# Patient Record
Sex: FEMALE | Race: WHITE | Employment: UNEMPLOYED | URBAN - METROPOLITAN AREA
[De-identification: names, ages, dates, MRNs, and addresses within clinical notes are randomized per-mention and may not be internally consistent; named-entity substitution may affect disease eponyms.]

---

## 2018-03-21 ENCOUNTER — OFFICE VISIT (OUTPATIENT)
Dept: OBGYN CLINIC | Age: 28
End: 2018-03-21
Payer: COMMERCIAL

## 2018-03-21 ENCOUNTER — HOSPITAL ENCOUNTER (OUTPATIENT)
Dept: ULTRASOUND IMAGING | Facility: CLINIC | Age: 28
Discharge: HOME | End: 2018-03-23
Payer: COMMERCIAL

## 2018-03-21 VITALS
HEIGHT: 63 IN | WEIGHT: 144 LBS | HEART RATE: 78 BPM | DIASTOLIC BLOOD PRESSURE: 77 MMHG | SYSTOLIC BLOOD PRESSURE: 114 MMHG | BODY MASS INDEX: 25.52 KG/M2

## 2018-03-21 DIAGNOSIS — N91.2 AMENORRHEA: ICD-10-CM

## 2018-03-21 DIAGNOSIS — N91.2 AMENORRHEA: Primary | ICD-10-CM

## 2018-03-21 LAB
CONTROL: POSITIVE
PREGNANCY TEST URINE, POC: POSITIVE

## 2018-03-21 PROCEDURE — 76817 TRANSVAGINAL US OBSTETRIC: CPT

## 2018-03-21 PROCEDURE — G8419 CALC BMI OUT NRM PARAM NOF/U: HCPCS | Performed by: ADVANCED PRACTICE MIDWIFE

## 2018-03-21 PROCEDURE — G8484 FLU IMMUNIZE NO ADMIN: HCPCS | Performed by: ADVANCED PRACTICE MIDWIFE

## 2018-03-21 PROCEDURE — G8427 DOCREV CUR MEDS BY ELIG CLIN: HCPCS | Performed by: ADVANCED PRACTICE MIDWIFE

## 2018-03-21 PROCEDURE — 81025 URINE PREGNANCY TEST: CPT | Performed by: ADVANCED PRACTICE MIDWIFE

## 2018-03-21 PROCEDURE — 76801 OB US < 14 WKS SINGLE FETUS: CPT

## 2018-03-21 PROCEDURE — 1036F TOBACCO NON-USER: CPT | Performed by: ADVANCED PRACTICE MIDWIFE

## 2018-03-21 PROCEDURE — 99202 OFFICE O/P NEW SF 15 MIN: CPT | Performed by: ADVANCED PRACTICE MIDWIFE

## 2018-03-21 NOTE — PROGRESS NOTES
SUBJECTIVE:    Chief Complaint:  Chief Complaint   Patient presents with   [address] [facility] [address] Amenorrhea     LMP 18 (4 days and lighter than normal)       HPI:    [facility]  is being seen today for missed menses. She reports a  positive pregnancy test on end of Feb.  This  is a planned pregnancy. She is  accepting at this time. LMP: 18      Sure and definite: No     28 day cycle: Yes    She was not on a contraceptive at the time of conception. Estimated weeks gestation today : 7w 5d  Tentative SKIP: 18    Relationship with FOB: good, strained by family    OBJECTIVE:    VS as documented in Epic. Mother's ethnicity:  fr[place]  Father's ethnicity:  fr[place]    She is complaining today of:   Pain: No  Cramping: No  Bleeding or spotting: No    Nausea: No  Vomiting: No    Breast enlargement/tenderness: Yes  Fatigue: Yes  Frequency of urination: No    Previous high risk obstetrical history: spont Ab x1  OB History    Para Term  AB Living   1       1     SAB TAB Ectopic Molar Multiple Live Births   1                # Outcome Date GA Lbr Jaylen/2nd Weight Sex Delivery Anes PTL Lv   1 SAB 2017                  ROS was done and is negative except as documented in HPI. History was reviewed as documented on Epic Navigator. ASSESSMENT:  Missed menses with + UCG    PLAN:  UCG was done and noted as positive  Prenatal vitamins were ordered: Yes  Ultrasound for dating and viability was ordered: Yes  OB form was given: Yes  Initial information on genetic testing was given:Yes  1 hour glucola was ordered: No  She was instructed to call with any concerns or worsening of any symptoms  She will return for New OB intake visit    Patient was seen with total face to face time of 20 minutes. More than 50% of this visit was spent face to face coordinating plan of care and answering questions regarding    1.  Amenorrhea  POCT urine pregnancy    US OB LESS THAN 14 WEEKS SINGLE OR FIRST GESTATION    US OB TRANSVAGINAL    . She was also counseled on her preventative health maintenance recommendations and follow-up.

## 2018-03-26 ENCOUNTER — TELEPHONE (OUTPATIENT)
Dept: OBGYN CLINIC | Age: 28
End: 2018-03-26

## 2018-03-26 ENCOUNTER — HOSPITAL ENCOUNTER (OUTPATIENT)
Age: 28
Setting detail: SPECIMEN
Discharge: HOME | End: 2018-03-26
Payer: COMMERCIAL

## 2018-03-26 DIAGNOSIS — O20.9 VAGINAL BLEEDING BEFORE 22 WEEKS GESTATION: ICD-10-CM

## 2018-03-26 DIAGNOSIS — O36.80X0 PREGNANCY WITH UNCERTAIN FETAL VIABILITY, SINGLE OR UNSPECIFIED FETUS: ICD-10-CM

## 2018-03-26 DIAGNOSIS — N92.6 MISSED MENSES: Primary | ICD-10-CM

## 2018-03-26 DIAGNOSIS — O20.9 VAGINAL BLEEDING BEFORE 22 WEEKS GESTATION: Primary | ICD-10-CM

## 2018-03-26 DIAGNOSIS — N92.6 MISSED MENSES: ICD-10-CM

## 2018-03-26 LAB
ABO/RH: NORMAL
ANTIBODY SCREEN: NEGATIVE
HCG QUANTITATIVE: ABNORMAL IU/L

## 2018-03-26 NOTE — TELEPHONE ENCOUNTER
Pt states that she had an HCG level done at [address]  prior to coming to our office on 3/21/18. She states that her level was in the 300's. She did have a positive pregnancy test in our office. She is currently experiencing spotting and lower back pain. According to her LMP she is 8w 2/7. Her US done on 3/21 needs to be repeated as they were not able to calculate a gestational age. She is requesting to have her HCG level drawn again as she has a Hx of miscarriage. Please advise.

## 2018-03-26 NOTE — TELEPHONE ENCOUNTER
Pt would like her HCG levels redrawn please give her a call when the order if ready or if there is an issue

## 2018-03-26 NOTE — TELEPHONE ENCOUNTER
Spoke with [facility]. Is aware of inconclusive US and need for repeat hcg. Bleeding again today. Hcg order along with blood type ordered.  Will come here for draw

## 2018-03-27 ENCOUNTER — HOSPITAL ENCOUNTER (OUTPATIENT)
Dept: ULTRASOUND IMAGING | Facility: CLINIC | Age: 28
Discharge: HOME | End: 2018-03-29
Payer: COMMERCIAL

## 2018-03-27 DIAGNOSIS — O36.80X0 PREGNANCY WITH UNCERTAIN FETAL VIABILITY, SINGLE OR UNSPECIFIED FETUS: ICD-10-CM

## 2018-03-27 DIAGNOSIS — O20.9 VAGINAL BLEEDING BEFORE 22 WEEKS GESTATION: ICD-10-CM

## 2018-03-27 PROCEDURE — 76801 OB US < 14 WKS SINGLE FETUS: CPT

## 2018-03-27 PROCEDURE — 76817 TRANSVAGINAL US OBSTETRIC: CPT

## 2018-04-06 ENCOUNTER — PATIENT MESSAGE (OUTPATIENT)
Dept: OBGYN CLINIC | Age: 28
End: 2018-04-06

## 2018-04-12 ENCOUNTER — PATIENT MESSAGE (OUTPATIENT)
Dept: OBGYN CLINIC | Age: 28
End: 2018-04-12

## 2018-04-23 ENCOUNTER — PATIENT MESSAGE (OUTPATIENT)
Dept: OBGYN CLINIC | Age: 28
End: 2018-04-23

## 2018-04-27 ENCOUNTER — INITIAL PRENATAL (OUTPATIENT)
Dept: OBGYN CLINIC | Age: 28
End: 2018-04-27
Payer: COMMERCIAL

## 2018-04-27 VITALS
BODY MASS INDEX: 25.86 KG/M2 | WEIGHT: 146 LBS | HEART RATE: 76 BPM | DIASTOLIC BLOOD PRESSURE: 74 MMHG | SYSTOLIC BLOOD PRESSURE: 117 MMHG

## 2018-04-27 DIAGNOSIS — A60.09 HERPES GENITALIS IN WOMEN: ICD-10-CM

## 2018-04-27 DIAGNOSIS — Z33.1 PREGNANCY, NORMAL, INCIDENTAL: Primary | ICD-10-CM

## 2018-04-27 DIAGNOSIS — B97.7 HPV IN FEMALE: ICD-10-CM

## 2018-04-27 DIAGNOSIS — Z86.59 HISTORY OF DEPRESSION: ICD-10-CM

## 2018-04-27 DIAGNOSIS — Z13.79 GENETIC TESTING: ICD-10-CM

## 2018-04-27 PROCEDURE — 1036F TOBACCO NON-USER: CPT | Performed by: ADVANCED PRACTICE MIDWIFE

## 2018-04-27 PROCEDURE — 99213 OFFICE O/P EST LOW 20 MIN: CPT | Performed by: ADVANCED PRACTICE MIDWIFE

## 2018-04-27 PROCEDURE — G8419 CALC BMI OUT NRM PARAM NOF/U: HCPCS | Performed by: ADVANCED PRACTICE MIDWIFE

## 2018-04-27 PROCEDURE — G8427 DOCREV CUR MEDS BY ELIG CLIN: HCPCS | Performed by: ADVANCED PRACTICE MIDWIFE

## 2018-05-15 ENCOUNTER — ROUTINE PRENATAL (OUTPATIENT)
Dept: PERINATAL CARE | Age: 28
End: 2018-05-15
Payer: COMMERCIAL

## 2018-05-15 VITALS
WEIGHT: 148 LBS | HEIGHT: 63 IN | SYSTOLIC BLOOD PRESSURE: 108 MMHG | DIASTOLIC BLOOD PRESSURE: 68 MMHG | RESPIRATION RATE: 16 BRPM | TEMPERATURE: 98.3 F | BODY MASS INDEX: 26.22 KG/M2 | HEART RATE: 78 BPM

## 2018-05-15 DIAGNOSIS — Z3A.13 13 WEEKS GESTATION OF PREGNANCY: ICD-10-CM

## 2018-05-15 DIAGNOSIS — O35.2XX0 HEREDITARY FAMILIAL DISEASE AFFECTING MANAGEMENT OF MOTHER AND POSSIBLY AFFECTING FETUS, ANTEPARTUM, SINGLE OR UNSPECIFIED FETUS: ICD-10-CM

## 2018-05-15 DIAGNOSIS — Z36.9 FIRST TRIMESTER SCREENING: Primary | ICD-10-CM

## 2018-05-15 DIAGNOSIS — O36.80X0 ENCOUNTER TO DETERMINE FETAL VIABILITY OF PREGNANCY, SINGLE OR UNSPECIFIED FETUS: ICD-10-CM

## 2018-05-15 PROCEDURE — 76801 OB US < 14 WKS SINGLE FETUS: CPT | Performed by: OBSTETRICS & GYNECOLOGY

## 2018-05-15 PROCEDURE — 76813 OB US NUCHAL MEAS 1 GEST: CPT | Performed by: OBSTETRICS & GYNECOLOGY

## 2018-05-27 PROBLEM — Z13.79 GENETIC TESTING: Status: RESOLVED | Noted: 2018-04-27 | Resolved: 2018-05-27

## 2018-06-15 ENCOUNTER — PATIENT MESSAGE (OUTPATIENT)
Dept: OBGYN CLINIC | Age: 28
End: 2018-06-15

## 2018-07-03 ENCOUNTER — ROUTINE PRENATAL (OUTPATIENT)
Dept: PERINATAL CARE | Age: 28
End: 2018-07-03
Payer: COMMERCIAL

## 2018-07-03 VITALS
HEART RATE: 76 BPM | BODY MASS INDEX: 26.75 KG/M2 | SYSTOLIC BLOOD PRESSURE: 106 MMHG | DIASTOLIC BLOOD PRESSURE: 68 MMHG | RESPIRATION RATE: 16 BRPM | WEIGHT: 151 LBS

## 2018-07-03 DIAGNOSIS — Z3A.20 20 WEEKS GESTATION OF PREGNANCY: ICD-10-CM

## 2018-07-03 DIAGNOSIS — Z36.86 ENCOUNTER FOR SCREENING FOR RISK OF PRE-TERM LABOR: ICD-10-CM

## 2018-07-03 DIAGNOSIS — O35.2XX0 HEREDITARY FAMILIAL DISEASE AFFECTING MANAGEMENT OF MOTHER AND POSSIBLY AFFECTING FETUS, ANTEPARTUM, SINGLE OR UNSPECIFIED FETUS: Primary | ICD-10-CM

## 2018-07-03 LAB
ABDOMINAL CIRCUMFERENCE: NORMAL CM
BIPARIETAL DIAMETER: NORMAL CM
ESTIMATED FETAL WEIGHT: NORMAL GRAMS
FEMORAL DIAMETER: NORMAL CM
HC/AC: NORMAL
HEAD CIRCUMFERENCE: NORMAL CM

## 2018-07-03 PROCEDURE — 76817 TRANSVAGINAL US OBSTETRIC: CPT | Performed by: OBSTETRICS & GYNECOLOGY

## 2018-07-03 PROCEDURE — 76811 OB US DETAILED SNGL FETUS: CPT | Performed by: OBSTETRICS & GYNECOLOGY

## 2018-07-03 RX ORDER — CALCIUM CARBONATE 200(500)MG
2 TABLET,CHEWABLE ORAL DAILY
COMMUNITY
Start: 2018-05-03 | End: 2020-01-07 | Stop reason: ALTCHOICE

## 2018-07-12 ENCOUNTER — HOSPITAL ENCOUNTER (OUTPATIENT)
Age: 28
Setting detail: SPECIMEN
Discharge: HOME | End: 2018-07-12
Payer: COMMERCIAL

## 2018-07-15 LAB
AFP INTERPRETATION: NORMAL
AFP MOM: 1.95
AFP SPECIMEN: NORMAL
AFP: 135 NG/ML
DATE OF BIRTH: NORMAL
DATING METHOD: NORMAL
DETERMINED BY: NORMAL
DIABETIC: NO
DUE DATE: NORMAL
ESTIMATED DUE DATE: NORMAL
FAMILY HISTORY NTD: NO
GESTATIONAL AGE: NORMAL
INSULIN REQ DIABETES: NO
LAST MENSTRUAL PERIOD: NORMAL
MATERNAL AGE AT EDD: 27.1 YR
MATERNAL WEIGHT: 151
MONOCHORIONIC TWINS: NORMAL
NUMBER OF FETUSES: NORMAL
PATIENT WEIGHT UNITS: NORMAL
PATIENT WEIGHT: NORMAL
RACE (MATERNAL): NORMAL
RACE: NORMAL
REPEAT SPECIMEN?: NO
SMOKING: NORMAL
SMOKING: NORMAL
VALPROIC/CARBAMAZEP: NORMAL
ZZ NTE CLEAN UP: HISTORY: NO

## 2018-07-23 ENCOUNTER — HOSPITAL ENCOUNTER (OUTPATIENT)
Age: 28
Setting detail: SPECIMEN
Discharge: HOME | End: 2018-07-23
Payer: COMMERCIAL

## 2018-07-23 ENCOUNTER — ROUTINE PRENATAL (OUTPATIENT)
Dept: OBGYN CLINIC | Age: 28
End: 2018-07-23
Payer: COMMERCIAL

## 2018-07-23 VITALS
BODY MASS INDEX: 27.14 KG/M2 | HEART RATE: 89 BPM | WEIGHT: 153.2 LBS | SYSTOLIC BLOOD PRESSURE: 112 MMHG | DIASTOLIC BLOOD PRESSURE: 68 MMHG

## 2018-07-23 DIAGNOSIS — Z33.1 PREGNANCY, NORMAL, INCIDENTAL: ICD-10-CM

## 2018-07-23 DIAGNOSIS — Z34.90 NORMAL PREGNANCY, ANTEPARTUM: Primary | ICD-10-CM

## 2018-07-23 DIAGNOSIS — Z3A.22 22 WEEKS GESTATION OF PREGNANCY: ICD-10-CM

## 2018-07-23 LAB
-: ABNORMAL
ABO/RH: NORMAL
ABSOLUTE EOS #: 0.27 K/UL (ref 0–0.44)
ABSOLUTE IMMATURE GRANULOCYTE: 0.09 K/UL (ref 0–0.3)
ABSOLUTE LYMPH #: 1.99 K/UL (ref 1.1–3.7)
ABSOLUTE MONO #: 0.61 K/UL (ref 0.1–1.2)
AMORPHOUS: ABNORMAL
AMPHETAMINE SCREEN URINE: NEGATIVE
ANTIBODY SCREEN: NEGATIVE
BACTERIA: ABNORMAL
BARBITURATE SCREEN URINE: NEGATIVE
BASOPHILS # BLD: 1 % (ref 0–2)
BASOPHILS ABSOLUTE: 0.05 K/UL (ref 0–0.2)
BENZODIAZEPINE SCREEN, URINE: NEGATIVE
BILIRUBIN URINE: NEGATIVE
BUPRENORPHINE URINE: NORMAL
CANNABINOID SCREEN URINE: NEGATIVE
CASTS UA: ABNORMAL /LPF (ref 0–2)
COCAINE METABOLITE, URINE: NEGATIVE
COLOR: YELLOW
COMMENT UA: ABNORMAL
CRYSTALS, UA: ABNORMAL /HPF
DIFFERENTIAL TYPE: ABNORMAL
EOSINOPHILS RELATIVE PERCENT: 3 % (ref 1–4)
EPITHELIAL CELLS UA: ABNORMAL /HPF (ref 0–5)
GLUCOSE URINE: ABNORMAL
HCT VFR BLD CALC: 36.3 % (ref 36.3–47.1)
HEMOGLOBIN: 11.7 G/DL (ref 11.9–15.1)
HEPATITIS B SURFACE ANTIGEN: NONREACTIVE
HIV AG/AB: NONREACTIVE
IMMATURE GRANULOCYTES: 1 %
KETONES, URINE: ABNORMAL
LEUKOCYTE ESTERASE, URINE: NEGATIVE
LYMPHOCYTES # BLD: 22 % (ref 24–43)
MCH RBC QN AUTO: 28.3 PG (ref 25.2–33.5)
MCHC RBC AUTO-ENTMCNC: 32.2 G/DL (ref 28.4–34.8)
MCV RBC AUTO: 87.7 FL (ref 82.6–102.9)
MDMA URINE: NORMAL
METHADONE SCREEN, URINE: NEGATIVE
METHAMPHETAMINE, URINE: NORMAL
MONOCYTES # BLD: 7 % (ref 3–12)
MUCUS: ABNORMAL
NITRITE, URINE: NEGATIVE
NRBC AUTOMATED: 0 PER 100 WBC
OPIATES, URINE: NEGATIVE
OTHER OBSERVATIONS UA: ABNORMAL
OXYCODONE SCREEN URINE: NEGATIVE
PDW BLD-RTO: 13.9 % (ref 11.8–14.4)
PH UA: 6 (ref 5–8)
PHENCYCLIDINE, URINE: NEGATIVE
PLATELET # BLD: 280 K/UL (ref 138–453)
PLATELET ESTIMATE: ABNORMAL
PMV BLD AUTO: 10.1 FL (ref 8.1–13.5)
PROPOXYPHENE, URINE: NORMAL
PROTEIN UA: ABNORMAL
RBC # BLD: 4.14 M/UL (ref 3.95–5.11)
RBC # BLD: ABNORMAL 10*6/UL
RBC UA: ABNORMAL /HPF (ref 0–2)
RENAL EPITHELIAL, UA: ABNORMAL /HPF
RUBV IGG SER QL: 65.8 IU/ML
SEG NEUTROPHILS: 66 % (ref 36–65)
SEGMENTED NEUTROPHILS ABSOLUTE COUNT: 6.16 K/UL (ref 1.5–8.1)
SPECIFIC GRAVITY UA: 1.04 (ref 1–1.03)
T. PALLIDUM, IGG: NONREACTIVE
TEST INFORMATION: NORMAL
TRICHOMONAS: ABNORMAL
TRICYCLIC ANTIDEPRESSANTS, UR: NORMAL
TURBIDITY: ABNORMAL
URINE HGB: NEGATIVE
UROBILINOGEN, URINE: NORMAL
WBC # BLD: 9.2 K/UL (ref 3.5–11.3)
WBC # BLD: ABNORMAL 10*3/UL
WBC UA: ABNORMAL /HPF (ref 0–5)
YEAST: ABNORMAL

## 2018-07-23 PROCEDURE — 99213 OFFICE O/P EST LOW 20 MIN: CPT | Performed by: ADVANCED PRACTICE MIDWIFE

## 2018-07-23 PROCEDURE — 1036F TOBACCO NON-USER: CPT | Performed by: ADVANCED PRACTICE MIDWIFE

## 2018-07-23 PROCEDURE — G8428 CUR MEDS NOT DOCUMENT: HCPCS | Performed by: ADVANCED PRACTICE MIDWIFE

## 2018-07-23 PROCEDURE — G8419 CALC BMI OUT NRM PARAM NOF/U: HCPCS | Performed by: ADVANCED PRACTICE MIDWIFE

## 2018-07-23 RX ORDER — ACETAMINOPHEN 500 MG
500 TABLET ORAL EVERY 6 HOURS PRN
Status: ON HOLD | COMMUNITY
End: 2018-11-08 | Stop reason: HOSPADM

## 2018-07-23 NOTE — PATIENT INSTRUCTIONS
as:  ¨ A rash. ¨ Itching. ¨ Yellow color to your skin. · You have other concerns about your pregnancy. If you have labor signs at 37 weeks or more  If you have signs of labor at 37 weeks or more, your doctor may tell you to call when your labor becomes more active. Symptoms of active labor include:  · Contractions that are regular. · Contractions that are less than 5 minutes apart. · Contractions that are hard to talk through. Follow-up care is a key part of your treatment and safety. Be sure to make and go to all appointments, and call your doctor if you are having problems. It's also a good idea to know your test results and keep a list of the medicines you take. Where can you learn more? Go to https://[employer]Rocketmiles. org and sign in to your [employer]etary) account. Enter  in the Mobile Game Day box to learn more about \"Learning About When to Call Your Doctor During Pregnancy (After 20 Weeks). \"     If you do not have an account, please click on the \"Sign Up Now\" link. Current as of: November 21, 2017  Content Version: 11.6  © 3684-5283 [employer]. Care instructions adapted under license by [facility] ([facility]). If you have questions about a medical condition or this instruction, always ask your healthcare professional. [place] [address] any warranty or liability for your use of this information. Patient Education        Weeks 22 to 26 of Your Pregnancy: Care Instructions  Your Care Instructions    As you enter your 7th month of pregnancy at week 26, your baby's lungs are growing stronger and getting ready to breathe. You may notice that your baby responds to the sound of your or your partner's voice. You may also notice that your baby does less turning and twisting and more squirming or jerking. Jerking often means that your baby has the hiccups. Hiccups are perfectly normal and are only temporary.   You may want to think about attending a [facility]. Care instructions adapted under license by [facility] ([facility]). If you have questions about a medical condition or this instruction, always ask your healthcare professional. [address] any warranty or liability for your use of this information.

## 2018-07-24 LAB
C. TRACHOMATIS DNA ,URINE: NEGATIVE
CULTURE: NORMAL
Lab: NORMAL
N. GONORRHOEAE DNA, URINE: NEGATIVE
SPECIMEN DESCRIPTION: NORMAL
STATUS: NORMAL

## 2018-07-27 ENCOUNTER — TELEPHONE (OUTPATIENT)
Dept: FAMILY MEDICINE CLINIC | Age: 28
End: 2018-07-27

## 2018-07-27 ENCOUNTER — TELEPHONE (OUTPATIENT)
Dept: OBGYN CLINIC | Age: 28
End: 2018-07-27

## 2018-07-27 DIAGNOSIS — M54.9 BACK PAIN AFFECTING PREGNANCY IN SECOND TRIMESTER: Primary | ICD-10-CM

## 2018-07-27 DIAGNOSIS — O99.891 BACK PAIN AFFECTING PREGNANCY IN SECOND TRIMESTER: Primary | ICD-10-CM

## 2018-07-27 NOTE — TELEPHONE ENCOUNTER
Pt said the script for the belly band went to the wrong pharmacy and resend to [facility], phone number is [phone]

## 2018-07-31 ENCOUNTER — ROUTINE PRENATAL (OUTPATIENT)
Dept: PERINATAL CARE | Age: 28
End: 2018-07-31
Payer: COMMERCIAL

## 2018-07-31 VITALS
RESPIRATION RATE: 16 BRPM | WEIGHT: 156 LBS | SYSTOLIC BLOOD PRESSURE: 106 MMHG | TEMPERATURE: 98.1 F | HEART RATE: 82 BPM | BODY MASS INDEX: 27.63 KG/M2 | DIASTOLIC BLOOD PRESSURE: 70 MMHG

## 2018-07-31 DIAGNOSIS — Z36.4 ANTENATAL SCREENING FOR FETAL GROWTH RETARDATION USING ULTRASONICS: ICD-10-CM

## 2018-07-31 DIAGNOSIS — Z3A.24 24 WEEKS GESTATION OF PREGNANCY: ICD-10-CM

## 2018-07-31 DIAGNOSIS — O35.2XX0 HEREDITARY FAMILIAL DISEASE AFFECTING MANAGEMENT OF MOTHER AND POSSIBLY AFFECTING FETUS, ANTEPARTUM, SINGLE OR UNSPECIFIED FETUS: Primary | ICD-10-CM

## 2018-07-31 PROCEDURE — 93325 DOPPLER ECHO COLOR FLOW MAPG: CPT | Performed by: OBSTETRICS & GYNECOLOGY

## 2018-07-31 PROCEDURE — 76816 OB US FOLLOW-UP PER FETUS: CPT | Performed by: OBSTETRICS & GYNECOLOGY

## 2018-07-31 PROCEDURE — 76825 ECHO EXAM OF FETAL HEART: CPT | Performed by: OBSTETRICS & GYNECOLOGY

## 2018-07-31 PROCEDURE — 76827 ECHO EXAM OF FETAL HEART: CPT | Performed by: OBSTETRICS & GYNECOLOGY

## 2018-08-20 ENCOUNTER — HOSPITAL ENCOUNTER (OUTPATIENT)
Age: 28
Setting detail: SPECIMEN
Discharge: HOME | End: 2018-08-20
Payer: COMMERCIAL

## 2018-08-20 ENCOUNTER — ROUTINE PRENATAL (OUTPATIENT)
Dept: OBGYN CLINIC | Age: 28
End: 2018-08-20
Payer: COMMERCIAL

## 2018-08-20 VITALS
SYSTOLIC BLOOD PRESSURE: 117 MMHG | WEIGHT: 160 LBS | DIASTOLIC BLOOD PRESSURE: 71 MMHG | BODY MASS INDEX: 28.34 KG/M2 | HEART RATE: 99 BPM

## 2018-08-20 DIAGNOSIS — Z34.90 NORMAL PREGNANCY, ANTEPARTUM: ICD-10-CM

## 2018-08-20 DIAGNOSIS — Z3A.26 26 WEEKS GESTATION OF PREGNANCY: ICD-10-CM

## 2018-08-20 DIAGNOSIS — Z86.19 HISTORY OF HERPES SIMPLEX TYPE 2 INFECTION: ICD-10-CM

## 2018-08-20 DIAGNOSIS — O99.810 ABNORMAL GLUCOSE TOLERANCE IN PREGNANCY: Primary | ICD-10-CM

## 2018-08-20 DIAGNOSIS — Z34.92 PRENATAL CARE IN SECOND TRIMESTER: ICD-10-CM

## 2018-08-20 DIAGNOSIS — Z34.92 PRENATAL CARE IN SECOND TRIMESTER: Primary | ICD-10-CM

## 2018-08-20 LAB
ABSOLUTE EOS #: 0.26 K/UL (ref 0–0.44)
ABSOLUTE IMMATURE GRANULOCYTE: 0.15 K/UL (ref 0–0.3)
ABSOLUTE LYMPH #: 2.13 K/UL (ref 1.1–3.7)
ABSOLUTE MONO #: 0.87 K/UL (ref 0.1–1.2)
BASOPHILS # BLD: 0 % (ref 0–2)
BASOPHILS ABSOLUTE: 0.04 K/UL (ref 0–0.2)
DIFFERENTIAL TYPE: ABNORMAL
EOSINOPHILS RELATIVE PERCENT: 2 % (ref 1–4)
GLUCOSE ADMINISTRATION: ABNORMAL
GLUCOSE TOLERANCE SCREEN 50G: 137 MG/DL (ref 70–135)
HCT VFR BLD CALC: 36.6 % (ref 36.3–47.1)
HEMOGLOBIN: 11.8 G/DL (ref 11.9–15.1)
HIV AG/AB: NONREACTIVE
IMMATURE GRANULOCYTES: 1 %
LYMPHOCYTES # BLD: 19 % (ref 24–43)
MCH RBC QN AUTO: 28.6 PG (ref 25.2–33.5)
MCHC RBC AUTO-ENTMCNC: 32.2 G/DL (ref 28.4–34.8)
MCV RBC AUTO: 88.6 FL (ref 82.6–102.9)
MONOCYTES # BLD: 8 % (ref 3–12)
NRBC AUTOMATED: 0 PER 100 WBC
PDW BLD-RTO: 13.7 % (ref 11.8–14.4)
PLATELET # BLD: 282 K/UL (ref 138–453)
PLATELET ESTIMATE: ABNORMAL
PMV BLD AUTO: 10 FL (ref 8.1–13.5)
RBC # BLD: 4.13 M/UL (ref 3.95–5.11)
RBC # BLD: ABNORMAL 10*6/UL
SEG NEUTROPHILS: 70 % (ref 36–65)
SEGMENTED NEUTROPHILS ABSOLUTE COUNT: 7.82 K/UL (ref 1.5–8.1)
T. PALLIDUM, IGG: NONREACTIVE
WBC # BLD: 11.3 K/UL (ref 3.5–11.3)
WBC # BLD: ABNORMAL 10*3/UL

## 2018-08-20 PROCEDURE — 99213 OFFICE O/P EST LOW 20 MIN: CPT | Performed by: ADVANCED PRACTICE MIDWIFE

## 2018-08-20 NOTE — PROGRESS NOTES
SUBJECTIVE:  Manoj is here for her return OB visit. She reports fetal movement. She denies vaginal bleeding. She denies vaginal discharge. She denies leaking of fluid. She denies uterine contraction activity. She denies nausea and/or vomiting. She denies retaining fluid in her extremities. Having some fatigue otherwise is feeling well. States she is unsure if FOB wants her to deliver at [facility]. V's. Discussed if she wanted to continue care with us that is where we deliver. Pt will let us know at next visit    IS having a boy named  Carlo Reid III after the baby's father     OBJECTIVE:  Blood pressure 117/71, pulse 99, weight 160 lb (72.6 kg), last menstrual period 01/26/2018. [facility] has not the flu vaccine as appropriate  [facility] has not the Tdap vaccine as appropriate is undecided     Swathii score: 9      ASSESSMENT/PLAN:  IUP @ 26+6 weeks  S = D  H/o HSV will start suppression at 36 weeks    The problem list was reviewed and updated with any new issues from today's visit    [facility] will monitor fetal movement daily. 28 week lab panel was discussed and was ordered, rpt HIV and RPR ordered d/t pt h/o of stds. RhoGam was discussed and was not ordered as it is not indicated   Discussed depression in pregnancy and PP, warning signs reviewed  Discussed tDap pt deciding will offer again at N/V    [facility] was counseled regarding all of the above    Patient was seen with total face to face time of 15 minutes. More than 50% of this visit was for education and counseling.

## 2018-08-21 ENCOUNTER — HOSPITAL ENCOUNTER (OUTPATIENT)
Age: 28
Setting detail: SPECIMEN
Discharge: HOME | End: 2018-08-21
Payer: COMMERCIAL

## 2018-08-21 DIAGNOSIS — O99.810 ABNORMAL GLUCOSE TOLERANCE IN PREGNANCY: ICD-10-CM

## 2018-08-21 LAB
AMOUNT GLUCOSE GIVEN: 100 G
GLUCOSE FASTING: 71 MG/DL (ref 65–99)
GLUCOSE TOLERANCE TEST 1 HOUR: 122 MG/DL (ref 65–184)
GLUCOSE TOLERANCE TEST 2 HOUR: 117 MG/DL (ref 65–139)
GLUCOSE TOLERANCE TEST 3 HOUR: 139 MG/DL (ref 65–130)

## 2018-08-22 ENCOUNTER — TELEPHONE (OUTPATIENT)
Dept: OBGYN CLINIC | Age: 28
End: 2018-08-22

## 2018-08-22 NOTE — TELEPHONE ENCOUNTER
Manoj called stating that she was to have a antibiotic called in for BV but I could not find a test that said she has bv but did tell her she failed her 3 hr glucose test.

## 2018-09-10 ENCOUNTER — ROUTINE PRENATAL (OUTPATIENT)
Dept: OBGYN CLINIC | Age: 28
End: 2018-09-10
Payer: COMMERCIAL

## 2018-09-10 VITALS
BODY MASS INDEX: 28.89 KG/M2 | SYSTOLIC BLOOD PRESSURE: 127 MMHG | WEIGHT: 163.1 LBS | DIASTOLIC BLOOD PRESSURE: 79 MMHG | HEART RATE: 109 BPM

## 2018-09-10 DIAGNOSIS — J06.9 ACUTE URI: ICD-10-CM

## 2018-09-10 DIAGNOSIS — Z34.90 NORMAL PREGNANCY, ANTEPARTUM: Primary | ICD-10-CM

## 2018-09-10 DIAGNOSIS — Z3A.29 29 WEEKS GESTATION OF PREGNANCY: ICD-10-CM

## 2018-09-10 LAB — S PYO AG THROAT QL: NORMAL

## 2018-09-10 PROCEDURE — 87880 STREP A ASSAY W/OPTIC: CPT | Performed by: ADVANCED PRACTICE MIDWIFE

## 2018-09-10 PROCEDURE — G8419 CALC BMI OUT NRM PARAM NOF/U: HCPCS | Performed by: ADVANCED PRACTICE MIDWIFE

## 2018-09-10 PROCEDURE — 1036F TOBACCO NON-USER: CPT | Performed by: ADVANCED PRACTICE MIDWIFE

## 2018-09-10 PROCEDURE — G8427 DOCREV CUR MEDS BY ELIG CLIN: HCPCS | Performed by: ADVANCED PRACTICE MIDWIFE

## 2018-09-10 PROCEDURE — 99213 OFFICE O/P EST LOW 20 MIN: CPT | Performed by: ADVANCED PRACTICE MIDWIFE

## 2018-09-10 NOTE — PROGRESS NOTES
SUBJECTIVE:  [facility] is here for an extra OB visit for URI. She has had this for the past several days. She denies any fever, malaise, etc.  States has tried \"everything\" with no improvement. Coughing will make her throw up at times. States others in family sick with same. She reports fetal movement with no other OB complaints      OBJECTIVE:  Blood pressure 127/79, pulse 109, weight 163 lb 1.6 oz (74 kg), last menstrual period 01/26/2018. On exam, ears are clear bilaterally with reflex present. Lungs  Clear  No erythema or white patches in throat. Rapid Strep obtained. ASSESSMENT/PLAN:  IUP @ 29.6 weeks  Acute viral URI      The problem list was reviewed and updated with any new issues from today's visit    [facility] will monitor fetal movement daily. Reinforced viral vs bacterial or flu. Continue with symptomatic treatment. Robitussin for cough. Call if worsening  Keep scheduled appt    [facility] was counseled regarding all of the above    Patient was seen with total face to face time of 15 minutes. More than 50% of this visit was for education and counseling.

## 2018-09-17 ENCOUNTER — ROUTINE PRENATAL (OUTPATIENT)
Dept: OBGYN CLINIC | Age: 28
End: 2018-09-17
Payer: COMMERCIAL

## 2018-09-17 VITALS
BODY MASS INDEX: 29.48 KG/M2 | DIASTOLIC BLOOD PRESSURE: 67 MMHG | HEART RATE: 90 BPM | WEIGHT: 166.4 LBS | SYSTOLIC BLOOD PRESSURE: 113 MMHG

## 2018-09-17 DIAGNOSIS — Z34.03 ENCOUNTER FOR SUPERVISION OF NORMAL FIRST PREGNANCY IN THIRD TRIMESTER: Primary | ICD-10-CM

## 2018-09-17 DIAGNOSIS — Z3A.30 30 WEEKS GESTATION OF PREGNANCY: ICD-10-CM

## 2018-09-17 DIAGNOSIS — Z23 NEED FOR TDAP VACCINATION: ICD-10-CM

## 2018-09-17 DIAGNOSIS — Z34.90 NORMAL PREGNANCY, ANTEPARTUM: ICD-10-CM

## 2018-09-17 PROCEDURE — 99212 OFFICE O/P EST SF 10 MIN: CPT | Performed by: ADVANCED PRACTICE MIDWIFE

## 2018-09-17 PROCEDURE — 90715 TDAP VACCINE 7 YRS/> IM: CPT | Performed by: ADVANCED PRACTICE MIDWIFE

## 2018-09-17 PROCEDURE — 90471 IMMUNIZATION ADMIN: CPT | Performed by: ADVANCED PRACTICE MIDWIFE

## 2018-09-17 NOTE — PROGRESS NOTES
SUBJECTIVE:  Manoj is here for her return OB visit. She reports fetal movement. She denies  vaginal bleeding. She denies  vaginal discharge. She denies leaking of fluid. She denies uterine contraction activity. She denies nausea and/or vomiting. She denies retaining fluid in her extremities. OBJECTIVE:  Blood pressure 113/67, pulse 90, weight 166 lb 6.4 oz (75.5 kg), last menstrual period 2018. [facility] has not received the flu vaccine as appropriate declined  [facility] has received the Tdap vaccine as appropriate TODAY 2018        ASSESSMENT/PLAN:  IUP @ 30+6 weeks  S = D  Abnormal 1 hr 3 hr WNL  The problem list was reviewed and updated with any new issues from today's visit    Manoj will monitor fetal movement daily. 28 week lab results were reviewed. Fetal Kick Count was discussed and explained. Max-Wilde contractions vs  labor contractions were reviewed. Signs and symptoms of Pre-Eclampsia were were reviewed and discussed    Manoj was counseled regarding all of the above    Patient was seen with total face to face time of 15 minutes. More than 50% of this visit was for education and counseling.

## 2018-10-01 ENCOUNTER — ROUTINE PRENATAL (OUTPATIENT)
Dept: OBGYN CLINIC | Age: 28
End: 2018-10-01
Payer: COMMERCIAL

## 2018-10-01 VITALS
WEIGHT: 168.7 LBS | HEART RATE: 99 BPM | BODY MASS INDEX: 29.88 KG/M2 | SYSTOLIC BLOOD PRESSURE: 114 MMHG | DIASTOLIC BLOOD PRESSURE: 76 MMHG

## 2018-10-01 DIAGNOSIS — Z23 NEEDS FLU SHOT: ICD-10-CM

## 2018-10-01 PROCEDURE — G8419 CALC BMI OUT NRM PARAM NOF/U: HCPCS | Performed by: ADVANCED PRACTICE MIDWIFE

## 2018-10-01 PROCEDURE — G8427 DOCREV CUR MEDS BY ELIG CLIN: HCPCS | Performed by: ADVANCED PRACTICE MIDWIFE

## 2018-10-01 PROCEDURE — 1036F TOBACCO NON-USER: CPT | Performed by: ADVANCED PRACTICE MIDWIFE

## 2018-10-01 PROCEDURE — G8484 FLU IMMUNIZE NO ADMIN: HCPCS | Performed by: ADVANCED PRACTICE MIDWIFE

## 2018-10-01 PROCEDURE — 99213 OFFICE O/P EST LOW 20 MIN: CPT | Performed by: ADVANCED PRACTICE MIDWIFE

## 2018-10-01 NOTE — PROGRESS NOTES
SUBJECTIVE:  Manoj is here for her return OB visit. She reports fetal movement. She denies  vaginal bleeding. She denies  vaginal discharge. She denies leaking of fluid. She denies uterine contraction activity. She denies nausea and/or vomiting. She denies retaining fluid in her extremities. Feeling well today. Offers no complaints. Good baby movements. OBJECTIVE:  Blood pressure 114/76, pulse 99, weight 168 lb 11.2 oz (76.5 kg), last menstrual period 2018. Manoj has not received the flu shot. Declined reviewed risks to benefits to her and fetus. Verbalized understanding   Manoj has received the Tdap vaccine as appropriate    ASSESSMENT/PLAN:  IUP @ 32.6 weeks  S = D  Normal Pregnancy. Antepatrum    The problem list was reviewed and updated with any new issues from today's visit    Manoj will monitor fetal movement daily. 28 week lab results were reviewed. Fetal Kick Count was discussed and explained. Max-Wilde contractions vs  labor contractions were reviewed. Signs and symptoms of Pre-Eclampsia were were reviewed and discussed    Manoj was counseled regarding all of the above  Will RTO in 2 weeks. Patient was seen with total face to face time of 15 minutes. More than 50% of this visit was for education and counseling.

## 2018-10-09 ENCOUNTER — HOSPITAL ENCOUNTER (OUTPATIENT)
Age: 28
Discharge: HOME | End: 2018-10-09
Attending: OBSTETRICS & GYNECOLOGY | Admitting: OBSTETRICS & GYNECOLOGY
Payer: COMMERCIAL

## 2018-10-09 ENCOUNTER — PATIENT MESSAGE (OUTPATIENT)
Dept: OBGYN CLINIC | Age: 28
End: 2018-10-09

## 2018-10-09 VITALS
RESPIRATION RATE: 16 BRPM | TEMPERATURE: 98.1 F | DIASTOLIC BLOOD PRESSURE: 78 MMHG | HEART RATE: 100 BPM | SYSTOLIC BLOOD PRESSURE: 122 MMHG

## 2018-10-09 PROBLEM — Z3A.34 34 WEEKS GESTATION OF PREGNANCY: Status: ACTIVE | Noted: 2018-10-09

## 2018-10-09 PROCEDURE — 99218 PR INITIAL OBSERVATION CARE/DAY 30 MINUTES: CPT | Performed by: ADVANCED PRACTICE MIDWIFE

## 2018-10-09 PROCEDURE — 99213 OFFICE O/P EST LOW 20 MIN: CPT

## 2018-10-09 PROCEDURE — G0378 HOSPITAL OBSERVATION PER HR: HCPCS
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
## 2018-10-09 NOTE — TELEPHONE ENCOUNTER
From: [facility]  To: Michael Webster APRN - CNM  Sent: 10/9/2018 10:23 AM EDT  Subject: Non-Urgent Medical Question    Hello, today I am 34 weeks pregnant. I had sex this morning and my boyfriend felt a popping sensation. I went to the bathroom and clear liquid came out of me very fast. I had a little white mucous when I wiped. I'm now getting this bubbly feeling in my stomach with some stomach pain. I was wondering if I should get checked or monitor till my doctor appt on the 15th.     Thanks

## 2018-10-10 NOTE — DISCHARGE SUMMARY
Triage Discharge Summary  [address] [facility]    Patient Name: Zaid Chandler  Patient : 1990  Primary Care Physician: No primary care provider on file.   Admit Date: 10/9/2018    Principal Diagnosis: IUP at 34w1d, evaluated and seen as Outpatient in Triage for leaking vag fluid and decreased fetal movement     Her pregnancy has been complicated by:   Patient Active Problem List   Diagnosis    Vaginal bleeding before 22 weeks gestation    Herpes genitalis in women    HPV in female    History of depression    Hereditary disease in family possibly affecting fetus, affecting management of mother, antepartum condition or complication    Normal pregnancy, antepartum    Abnormal glucose tolerance affecting pregnancy, antepartum    Encounter for supervision of normal first pregnancy in third trimester    Declined flu shot 10/1    34 weeks gestation of pregnancy    Vaginal discharge in third trimester, antepartum    Decreased fetal movements in third trimester       Infection Present?: No        Consultations: none    Pertinent Findings & Procedures:   Zaid Chandler is a 33 y.o. female  at 34w3d evaluated in outpatient setting for r/o ROM and NST and received EFM, SSE        Course of patient: uncomplicated    Discharge to: Home      Recommendations on Discharge:     Medications:       Bare   Home Medication Instructions AVF:[id number]    Printed on:10/10/18 5819   Medication Information                      acetaminophen (TYLENOL) 500 MG tablet  Take 500 mg by mouth every 6 hours as needed for Pain             calcium carbonate (TUMS) 500 MG chewable tablet  Take 2 tablets by mouth daily             Dextromethorphan-guaiFENesin  MG CAPS  Take 1 capsule by mouth 3 times daily             Elastic Bandages & Supports (LUMBOSACRAL SUPPORT ELASTIC M) MISC  1 Piece by Does not apply route daily             Prenatal Vit-Fe Fumarate-FA (PRENATAL VITAMIN PO)  Take by

## 2018-10-15 ENCOUNTER — ROUTINE PRENATAL (OUTPATIENT)
Dept: OBGYN CLINIC | Age: 28
End: 2018-10-15
Payer: COMMERCIAL

## 2018-10-15 VITALS
HEART RATE: 92 BPM | DIASTOLIC BLOOD PRESSURE: 86 MMHG | SYSTOLIC BLOOD PRESSURE: 129 MMHG | BODY MASS INDEX: 30.86 KG/M2 | WEIGHT: 174.2 LBS

## 2018-10-15 DIAGNOSIS — Z34.03 ENCOUNTER FOR SUPERVISION OF NORMAL FIRST PREGNANCY IN THIRD TRIMESTER: Primary | ICD-10-CM

## 2018-10-15 DIAGNOSIS — Z3A.34 34 WEEKS GESTATION OF PREGNANCY: ICD-10-CM

## 2018-10-15 DIAGNOSIS — Z86.19 HISTORY OF HERPES GENITALIS: ICD-10-CM

## 2018-10-15 PROCEDURE — 99212 OFFICE O/P EST SF 10 MIN: CPT | Performed by: ADVANCED PRACTICE MIDWIFE

## 2018-10-15 RX ORDER — VALACYCLOVIR HYDROCHLORIDE 500 MG/1
500 TABLET, FILM COATED ORAL 2 TIMES DAILY
Qty: 60 TABLET | Refills: 3 | Status: SHIPPED | OUTPATIENT
Start: 2018-10-15 | End: 2019-04-04 | Stop reason: ALTCHOICE

## 2018-10-16 ENCOUNTER — TELEPHONE (OUTPATIENT)
Dept: OBGYN CLINIC | Age: 28
End: 2018-10-16

## 2018-10-22 ENCOUNTER — ROUTINE PRENATAL (OUTPATIENT)
Dept: OBGYN CLINIC | Age: 28
End: 2018-10-22
Payer: COMMERCIAL

## 2018-10-22 VITALS
DIASTOLIC BLOOD PRESSURE: 83 MMHG | HEART RATE: 102 BPM | BODY MASS INDEX: 31.16 KG/M2 | WEIGHT: 175.9 LBS | SYSTOLIC BLOOD PRESSURE: 130 MMHG

## 2018-10-22 DIAGNOSIS — Z3A.36 36 WEEKS GESTATION OF PREGNANCY: ICD-10-CM

## 2018-10-22 DIAGNOSIS — Z34.03 ENCOUNTER FOR SUPERVISION OF NORMAL FIRST PREGNANCY IN THIRD TRIMESTER: Primary | ICD-10-CM

## 2018-10-22 PROCEDURE — 99212 OFFICE O/P EST SF 10 MIN: CPT | Performed by: ADVANCED PRACTICE MIDWIFE

## 2018-10-29 ENCOUNTER — HOSPITAL ENCOUNTER (OUTPATIENT)
Age: 28
Setting detail: SPECIMEN
Discharge: HOME | End: 2018-10-29
Payer: COMMERCIAL

## 2018-10-29 ENCOUNTER — ROUTINE PRENATAL (OUTPATIENT)
Dept: OBGYN CLINIC | Age: 28
End: 2018-10-29
Payer: COMMERCIAL

## 2018-10-29 VITALS
DIASTOLIC BLOOD PRESSURE: 84 MMHG | WEIGHT: 178.8 LBS | SYSTOLIC BLOOD PRESSURE: 124 MMHG | HEART RATE: 99 BPM | BODY MASS INDEX: 31.67 KG/M2

## 2018-10-29 DIAGNOSIS — A60.09 HERPES GENITALIS IN WOMEN: ICD-10-CM

## 2018-10-29 DIAGNOSIS — Z34.90 NORMAL PREGNANCY, ANTEPARTUM: Primary | ICD-10-CM

## 2018-10-29 DIAGNOSIS — Z3A.36 36 WEEKS GESTATION OF PREGNANCY: ICD-10-CM

## 2018-10-29 DIAGNOSIS — Z34.90 NORMAL PREGNANCY, ANTEPARTUM: ICD-10-CM

## 2018-10-29 DIAGNOSIS — Z86.59 HISTORY OF DEPRESSION: ICD-10-CM

## 2018-10-29 PROBLEM — Z3A.34 34 WEEKS GESTATION OF PREGNANCY: Status: RESOLVED | Noted: 2018-10-09 | Resolved: 2018-10-29

## 2018-10-29 PROBLEM — Z34.03 ENCOUNTER FOR SUPERVISION OF NORMAL FIRST PREGNANCY IN THIRD TRIMESTER: Status: RESOLVED | Noted: 2018-09-17 | Resolved: 2018-10-29

## 2018-10-29 PROCEDURE — 99213 OFFICE O/P EST LOW 20 MIN: CPT | Performed by: ADVANCED PRACTICE MIDWIFE

## 2018-10-29 PROCEDURE — G8417 CALC BMI ABV UP PARAM F/U: HCPCS | Performed by: ADVANCED PRACTICE MIDWIFE

## 2018-10-29 PROCEDURE — 1036F TOBACCO NON-USER: CPT | Performed by: ADVANCED PRACTICE MIDWIFE

## 2018-10-29 PROCEDURE — G8484 FLU IMMUNIZE NO ADMIN: HCPCS | Performed by: ADVANCED PRACTICE MIDWIFE

## 2018-10-29 PROCEDURE — G8427 DOCREV CUR MEDS BY ELIG CLIN: HCPCS | Performed by: ADVANCED PRACTICE MIDWIFE

## 2018-11-01 LAB
CULTURE: NORMAL
Lab: NORMAL
SPECIMEN DESCRIPTION: NORMAL
STATUS: NORMAL

## 2018-11-05 ENCOUNTER — ANESTHESIA EVENT (OUTPATIENT)
Dept: LABOR AND DELIVERY | Age: 28
DRG: 560 | End: 2018-11-05
Payer: COMMERCIAL

## 2018-11-05 ENCOUNTER — TELEPHONE (OUTPATIENT)
Dept: OBGYN CLINIC | Age: 28
End: 2018-11-05

## 2018-11-05 ENCOUNTER — ANESTHESIA (OUTPATIENT)
Dept: LABOR AND DELIVERY | Age: 28
DRG: 560 | End: 2018-11-05
Payer: COMMERCIAL

## 2018-11-05 ENCOUNTER — HOSPITAL ENCOUNTER (INPATIENT)
Age: 28
LOS: 2 days | Discharge: HOME | DRG: 560 | End: 2018-11-08
Attending: OBSTETRICS & GYNECOLOGY | Admitting: OBSTETRICS & GYNECOLOGY
Payer: COMMERCIAL

## 2018-11-05 PROBLEM — Z3A.37 37 WEEKS GESTATION OF PREGNANCY: Status: ACTIVE | Noted: 2018-11-05

## 2018-11-05 LAB
-: ABNORMAL
ABO/RH: NORMAL
ABSOLUTE EOS #: 0.22 K/UL (ref 0–0.44)
ABSOLUTE IMMATURE GRANULOCYTE: 0.12 K/UL (ref 0–0.3)
ABSOLUTE LYMPH #: 3.73 K/UL (ref 1.1–3.7)
ABSOLUTE MONO #: 1.03 K/UL (ref 0.1–1.2)
AMORPHOUS: ABNORMAL
AMPHETAMINE SCREEN URINE: NEGATIVE
ANTIBODY SCREEN: NEGATIVE
ARM BAND NUMBER: NORMAL
BACTERIA: ABNORMAL
BARBITURATE SCREEN URINE: NEGATIVE
BASOPHILS # BLD: 1 % (ref 0–2)
BASOPHILS ABSOLUTE: 0.07 K/UL (ref 0–0.2)
BENZODIAZEPINE SCREEN, URINE: NEGATIVE
BILIRUBIN URINE: NEGATIVE
BUPRENORPHINE URINE: NORMAL
CANNABINOID SCREEN URINE: NEGATIVE
CASTS UA: ABNORMAL /LPF (ref 0–8)
COCAINE METABOLITE, URINE: NEGATIVE
COLOR: YELLOW
COMMENT UA: ABNORMAL
CRYSTALS, UA: ABNORMAL /HPF
DIFFERENTIAL TYPE: ABNORMAL
DIRECT EXAM: NORMAL
EOSINOPHILS RELATIVE PERCENT: 2 % (ref 1–4)
EPITHELIAL CELLS UA: ABNORMAL /HPF (ref 0–5)
EXPIRATION DATE: NORMAL
GLUCOSE URINE: ABNORMAL
HCT VFR BLD CALC: 36.9 % (ref 36.3–47.1)
HEMOGLOBIN: 11.8 G/DL (ref 11.9–15.1)
IMMATURE GRANULOCYTES: 1 %
KETONES, URINE: NEGATIVE
LEUKOCYTE ESTERASE, URINE: NEGATIVE
LYMPHOCYTES # BLD: 28 % (ref 24–43)
Lab: NORMAL
MCH RBC QN AUTO: 26.3 PG (ref 25.2–33.5)
MCHC RBC AUTO-ENTMCNC: 32 G/DL (ref 28.4–34.8)
MCV RBC AUTO: 82.2 FL (ref 82.6–102.9)
MDMA URINE: NORMAL
METHADONE SCREEN, URINE: NEGATIVE
METHAMPHETAMINE, URINE: NORMAL
MONOCYTES # BLD: 8 % (ref 3–12)
MUCUS: ABNORMAL
NITRITE, URINE: NEGATIVE
NRBC AUTOMATED: 0 PER 100 WBC
OPIATES, URINE: NEGATIVE
OTHER OBSERVATIONS UA: ABNORMAL
OXYCODONE SCREEN URINE: NEGATIVE
PDW BLD-RTO: 14.5 % (ref 11.8–14.4)
PH UA: 7 (ref 5–8)
PHENCYCLIDINE, URINE: NEGATIVE
PLATELET # BLD: 271 K/UL (ref 138–453)
PLATELET ESTIMATE: ABNORMAL
PMV BLD AUTO: 11.1 FL (ref 8.1–13.5)
PROPOXYPHENE, URINE: NORMAL
PROTEIN UA: NEGATIVE
RBC # BLD: 4.49 M/UL (ref 3.95–5.11)
RBC # BLD: ABNORMAL 10*6/UL
RBC UA: ABNORMAL /HPF (ref 0–4)
RENAL EPITHELIAL, UA: ABNORMAL /HPF
SEG NEUTROPHILS: 60 % (ref 36–65)
SEGMENTED NEUTROPHILS ABSOLUTE COUNT: 7.96 K/UL (ref 1.5–8.1)
SPECIFIC GRAVITY UA: 1.02 (ref 1–1.03)
SPECIMEN DESCRIPTION: NORMAL
STATUS: NORMAL
T. PALLIDUM, IGG: NONREACTIVE
TEST INFORMATION: NORMAL
TRICHOMONAS: ABNORMAL
TRICYCLIC ANTIDEPRESSANTS, UR: NORMAL
TURBIDITY: ABNORMAL
URINE HGB: NEGATIVE
UROBILINOGEN, URINE: NORMAL
WBC # BLD: 13.1 K/UL (ref 3.5–11.3)
WBC # BLD: ABNORMAL 10*3/UL
WBC UA: ABNORMAL /HPF (ref 0–5)
YEAST: ABNORMAL

## 2018-11-05 PROCEDURE — 6360000002 HC RX W HCPCS: Performed by: NURSE ANESTHETIST, CERTIFIED REGISTERED

## 2018-11-05 PROCEDURE — 6360000002 HC RX W HCPCS

## 2018-11-05 PROCEDURE — 81001 URINALYSIS AUTO W/SCOPE: CPT

## 2018-11-05 PROCEDURE — 87510 GARDNER VAG DNA DIR PROBE: CPT

## 2018-11-05 PROCEDURE — 86900 BLOOD TYPING SEROLOGIC ABO: CPT

## 2018-11-05 PROCEDURE — 3700000025 ANESTHESIA EPIDURAL BLOCK: Performed by: ANESTHESIOLOGY

## 2018-11-05 PROCEDURE — 87086 URINE CULTURE/COLONY COUNT: CPT

## 2018-11-05 PROCEDURE — 86850 RBC ANTIBODY SCREEN: CPT

## 2018-11-05 PROCEDURE — 87591 N.GONORRHOEAE DNA AMP PROB: CPT

## 2018-11-05 PROCEDURE — 87491 CHLMYD TRACH DNA AMP PROBE: CPT

## 2018-11-05 PROCEDURE — 80307 DRUG TEST PRSMV CHEM ANLYZR: CPT

## 2018-11-05 PROCEDURE — 85025 COMPLETE CBC W/AUTO DIFF WBC: CPT

## 2018-11-05 PROCEDURE — 86901 BLOOD TYPING SEROLOGIC RH(D): CPT

## 2018-11-05 PROCEDURE — 2500000003 HC RX 250 WO HCPCS: Performed by: NURSE ANESTHETIST, CERTIFIED REGISTERED

## 2018-11-05 PROCEDURE — 2580000003 HC RX 258: Performed by: OBSTETRICS & GYNECOLOGY

## 2018-11-05 PROCEDURE — 6370000000 HC RX 637 (ALT 250 FOR IP): Performed by: STUDENT IN AN ORGANIZED HEALTH CARE EDUCATION/TRAINING PROGRAM

## 2018-11-05 PROCEDURE — 87480 CANDIDA DNA DIR PROBE: CPT

## 2018-11-05 PROCEDURE — 87660 TRICHOMONAS VAGIN DIR PROBE: CPT

## 2018-11-05 PROCEDURE — 6360000002 HC RX W HCPCS: Performed by: ANESTHESIOLOGY

## 2018-11-05 PROCEDURE — 86780 TREPONEMA PALLIDUM: CPT

## 2018-11-05 RX ORDER — VALACYCLOVIR HYDROCHLORIDE 500 MG/1
500 TABLET, FILM COATED ORAL 2 TIMES DAILY
Status: DISCONTINUED | OUTPATIENT
Start: 2018-11-05 | End: 2018-11-08 | Stop reason: HOSPADM

## 2018-11-05 RX ORDER — NALBUPHINE HCL 10 MG/ML
AMPUL (ML) INJECTION
Status: COMPLETED
Start: 2018-11-05 | End: 2018-11-05

## 2018-11-05 RX ORDER — LIDOCAINE HYDROCHLORIDE AND EPINEPHRINE 15; 5 MG/ML; UG/ML
INJECTION, SOLUTION EPIDURAL PRN
Status: DISCONTINUED | OUTPATIENT
Start: 2018-11-05 | End: 2018-11-06 | Stop reason: SDUPTHER

## 2018-11-05 RX ORDER — ACETAMINOPHEN 325 MG/1
650 TABLET ORAL EVERY 4 HOURS PRN
Status: DISCONTINUED | OUTPATIENT
Start: 2018-11-05 | End: 2018-11-06

## 2018-11-05 RX ORDER — ROPIVACAINE HYDROCHLORIDE 2 MG/ML
INJECTION, SOLUTION EPIDURAL; INFILTRATION; PERINEURAL CONTINUOUS PRN
Status: DISCONTINUED | OUTPATIENT
Start: 2018-11-05 | End: 2018-11-06 | Stop reason: SDUPTHER

## 2018-11-05 RX ORDER — ROPIVACAINE HYDROCHLORIDE 2 MG/ML
INJECTION, SOLUTION EPIDURAL; INFILTRATION; PERINEURAL
Status: COMPLETED
Start: 2018-11-05 | End: 2018-11-05

## 2018-11-05 RX ORDER — SODIUM CHLORIDE, SODIUM LACTATE, POTASSIUM CHLORIDE, CALCIUM CHLORIDE 600; 310; 30; 20 MG/100ML; MG/100ML; MG/100ML; MG/100ML
INJECTION, SOLUTION INTRAVENOUS CONTINUOUS
Status: DISCONTINUED | OUTPATIENT
Start: 2018-11-05 | End: 2018-11-06

## 2018-11-05 RX ORDER — DIPHENHYDRAMINE HCL 25 MG
25 TABLET ORAL EVERY 4 HOURS PRN
Status: DISCONTINUED | OUTPATIENT
Start: 2018-11-05 | End: 2018-11-06

## 2018-11-05 RX ORDER — NALBUPHINE HCL 10 MG/ML
10 AMPUL (ML) INJECTION
Status: DISCONTINUED | OUTPATIENT
Start: 2018-11-05 | End: 2018-11-06

## 2018-11-05 RX ORDER — ONDANSETRON 2 MG/ML
4 INJECTION INTRAMUSCULAR; INTRAVENOUS EVERY 6 HOURS PRN
Status: DISCONTINUED | OUTPATIENT
Start: 2018-11-05 | End: 2018-11-06

## 2018-11-05 RX ORDER — NALOXONE HYDROCHLORIDE 0.4 MG/ML
0.4 INJECTION, SOLUTION INTRAMUSCULAR; INTRAVENOUS; SUBCUTANEOUS PRN
Status: DISCONTINUED | OUTPATIENT
Start: 2018-11-05 | End: 2018-11-06

## 2018-11-05 RX ORDER — NALBUPHINE HCL 10 MG/ML
5 AMPUL (ML) INJECTION EVERY 4 HOURS PRN
Status: DISCONTINUED | OUTPATIENT
Start: 2018-11-05 | End: 2018-11-06

## 2018-11-05 RX ORDER — ROPIVACAINE HYDROCHLORIDE 2 MG/ML
INJECTION, SOLUTION EPIDURAL; INFILTRATION; PERINEURAL PRN
Status: DISCONTINUED | OUTPATIENT
Start: 2018-11-05 | End: 2018-11-06 | Stop reason: SDUPTHER

## 2018-11-05 RX ADMIN — ROPIVACAINE HYDROCHLORIDE 5 ML: 2 INJECTION, SOLUTION EPIDURAL; INFILTRATION at 19:23

## 2018-11-05 RX ADMIN — ACETAMINOPHEN 650 MG: 325 TABLET ORAL at 17:24

## 2018-11-05 RX ADMIN — LIDOCAINE HYDROCHLORIDE,EPINEPHRINE BITARTRATE 3 ML: 15; .005 INJECTION, SOLUTION EPIDURAL; INFILTRATION; INTRACAUDAL; PERINEURAL at 19:19

## 2018-11-05 RX ADMIN — NALBUPHINE HYDROCHLORIDE 10 MG: 10 INJECTION, SOLUTION INTRAMUSCULAR; INTRAVENOUS; SUBCUTANEOUS at 19:12

## 2018-11-05 RX ADMIN — ROPIVACAINE HYDROCHLORIDE 8 ML/HR: 2 INJECTION, SOLUTION EPIDURAL; INFILTRATION at 19:25

## 2018-11-05 RX ADMIN — ROPIVACAINE HYDROCHLORIDE 5 ML: 2 INJECTION, SOLUTION EPIDURAL; INFILTRATION at 19:20

## 2018-11-05 RX ADMIN — Medication 8 ML/HR: at 19:40

## 2018-11-05 RX ADMIN — SODIUM CHLORIDE, POTASSIUM CHLORIDE, SODIUM LACTATE AND CALCIUM CHLORIDE: 600; 310; 30; 20 INJECTION, SOLUTION INTRAVENOUS at 18:05

## 2018-11-05 ASSESSMENT — PAIN SCALES - GENERAL
PAINLEVEL_OUTOF10: 10
PAINLEVEL_OUTOF10: 10

## 2018-11-05 NOTE — PLAN OF CARE
Problem: Anxiety:  Goal: Level of anxiety will decrease  Level of anxiety will decrease  Outcome: Ongoing      Problem: Breathing Pattern - Ineffective:  Goal: Able to breathe comfortably  Able to breathe comfortably  Outcome: Ongoing      Problem: Fluid Volume - Imbalance:  Goal: Absence of imbalanced fluid volume signs and symptoms  Absence of imbalanced fluid volume signs and symptoms  Outcome: Ongoing      Problem: Infection - Intrapartum Infection:  Goal: Will show no infection signs and symptoms  Will show no infection signs and symptoms  Outcome: Ongoing      Problem: Labor Process - Prolonged:  Goal: Labor progression, first stage, within specified pattern  Labor progression, first stage, within specified pattern  Outcome: Ongoing

## 2018-11-05 NOTE — FLOWSHEET NOTE
States it feels like I am leaking fluid, 3 inch wet Elem on under pad and sheet, nitrazine positive, Dr Bebeto Yoo informed, pt up to br.

## 2018-11-05 NOTE — ANESTHESIA PRE PROCEDURE
Department of Anesthesiology  Preprocedure Note       Name:  Estuardo Claros   Age:  33 y.o.  :  1990                                          MRN:  [id number]         Date:  2018      Surgeon: midwife    Procedure: ANESTHESIA LABOR ANALGESIA    Medications prior to admission:   Prior to Admission medications    Medication Sig Start Date End Date Taking? Authorizing Provider   valACYclovir (VALTREX) 500 MG tablet Take 1 tablet by mouth 2 times daily 10/15/18  Yes DENNY Diaz CNM   calcium carbonate (TUMS) 500 MG chewable tablet Take 2 tablets by mouth daily 5/3/18  Yes Historical Provider, MD   Prenatal Vit-Fe Fumarate-FA (PRENATAL VITAMIN PO) Take by mouth   Yes Historical Provider, MD   Dextromethorphan-guaiFENesin  MG CAPS Take 1 capsule by mouth 3 times daily 9/10/18   DENNY Miller CNM   Elastic Bandages & Supports (LUMBOSACRAL SUPPORT ELASTIC M) [address] [facility] 1 Piece by Does not apply route daily 18   DENNY Miller CNM   acetaminophen (TYLENOL) 500 MG tablet Take 500 mg by mouth every 6 hours as needed for Pain    Historical Provider, MD       Current medications:    Current Facility-Administered Medications   Medication Dose Route Frequency Provider Last Rate Last Dose    acetaminophen (TYLENOL) tablet 650 mg  650 mg Oral Q4H PRN Sven Parson, DO   650 mg at 18 1724    lactated ringers infusion   Intravenous Continuous Matheus D Tarride, DO        acetaminophen (TYLENOL) tablet 650 mg  650 mg Oral Q4H PRN Matheus D Tarride, DO        diphenhydrAMINE (BENADRYL) tablet 25 mg  25 mg Oral Q4H PRN Matheus D Tarride, DO        ondansetron (ZOFRAN) injection 4 mg  4 mg Intravenous Q6H PRN Matheus D Tarride, DO        oxytocin (PITOCIN) 30 units in 500 mL infusion Override Pull             valACYclovir (VALTREX) tablet 500 mg  500 mg Oral BID Proadea Burnette, DO        ropivacaine (NAROPIN) 0.2% injection 0.2%                Allergies:     Allergies   Allergen Reactions    No

## 2018-11-05 NOTE — H&P
Decreased fetal movements in third trimester: RESOLVED    37 weeks gestation of pregnancy        Steroids Given In This Pregnancy:  no     REVIEW OF SYSTEMS:  Constitutional: negative fever, negative chills  HEENT: negative visual disturbances, negative headaches  Respiratory: negative dyspnea, negative cough  Cardiovascular: negative chest pain,  negative palpitations  Gastrointestinal: negative abdominal pain, negative RUQ pain, negative N/V, negative diarrhea, negative constipation  Genitourinary: negative dysuria, positive vaginal discharge  Dermatological: negative rash. Positive swelling in lower extremities  Hematologic: negative bruising  Immunologic/Lymphatic: negative recent illness, negative recent sick contact  Musculoskeletal: negative back pain, negative myalgias, positive arthralgias  Neurological:  negative dizziness, negative weakness. Positive periodic numbness in L upper extremity   Behavior/Psych:  negative anxiety    OBSTETRICAL HISTORY:   Obstetric History       T0      L0     SAB1   TAB0   Ectopic0   Molar0   Multiple0   Live Births0       # Outcome Date GA Lbr Jaylen/2nd Weight Sex Delivery Anes PTL Lv   2 Current            1 2017                  PAST MEDICAL HISTORY:   has a past medical history of Depression; Herpes simplex virus (HSV) infection; and Trauma. PAST SURGICAL HISTORY:   has a past surgical history that includes Colposcopy. ALLERGIES:  is allergic to no known allergies and unable to assess. MEDICATIONS:  Prior to Admission medications    Medication Sig Start Date End Date Taking?  Authorizing Provider   valACYclovir (VALTREX) 500 MG tablet Take 1 tablet by mouth 2 times daily 10/15/18  Yes DENNY Chavez CNM   calcium carbonate (TUMS) 500 MG chewable tablet Take 2 tablets by mouth daily 5/3/18  Yes Historical Provider, MD   Prenatal Vit-Fe Fumarate-FA (PRENATAL VITAMIN PO) Take by mouth   Yes Historical Provider, MD Depression   - Controlled on no medications   - Denies SI/HI    Abnormal 1 hour Oral Glucose Tolerance Test   - 3 hour OGTT wnl    BMI 31.6    Patient Active Problem List    Diagnosis Date Noted    Declined flu shot 10/1 10/01/2018     Priority: Low    37 weeks gestation of pregnancy 11/05/2018    Decreased fetal movements in third trimester: RESOLVED     Abnormal glucose tolerance affecting pregnancy, antepartum 08/20/2018     1hr 137  Order for 3 hr 71,122,117,139 WNL 1 elevated value       Normal pregnancy, antepartum 07/23/2018     Having boy desires circ  Residents OK  Vitamin K ok  Desires natural labor open to epidural     Tadp received 9/17/18      Hereditary disease in family possibly affecting fetus, affecting management of mother, antepartum condition or complication 19/68/7296     7/3/2018 Fetal echo at 24-28 weeks due to family history; was WNL      Herpes genitalis in women 04/27/2018 4/27/2018 No outbreaks in years, start meds at 36 weeks: (10/29) States taking meds      HPV in female 04/27/2018 4/27/2018 Hx abn paps, last WNL, get PP      History of depression 04/27/2018     · 4/27/2018 Several years ago, no recent meds. · (10/29) EDPS= 17, states known stressor and SO supportive; declined medication      Vaginal bleeding before 22 weeks gestation: RESOLVED 03/26/2018       Plan discussed with Erlinda Jones CNM, who is agreeable. Steroids given this admission: No    Risks, benefits, alternatives and possible complications have been discussed in detail with the patient. Admission, and post admission procedures and expectations were discussed in detail. All questions were answered.     Attending's Name: Dr. Mariano Judge DO  Ob/Gyn Resident  11/5/2018, 4:03 PM

## 2018-11-06 PROBLEM — Z3A.38 38 WEEKS GESTATION OF PREGNANCY: Status: ACTIVE | Noted: 2018-11-06

## 2018-11-06 LAB
C TRACH DNA GENITAL QL NAA+PROBE: NEGATIVE
CULTURE: NORMAL
Lab: NORMAL
N. GONORRHOEAE DNA: NEGATIVE
SPECIMEN DESCRIPTION: NORMAL
STATUS: NORMAL

## 2018-11-06 PROCEDURE — 6360000002 HC RX W HCPCS: Performed by: ADVANCED PRACTICE MIDWIFE

## 2018-11-06 PROCEDURE — 6370000000 HC RX 637 (ALT 250 FOR IP): Performed by: STUDENT IN AN ORGANIZED HEALTH CARE EDUCATION/TRAINING PROGRAM

## 2018-11-06 PROCEDURE — 6360000002 HC RX W HCPCS: Performed by: ANESTHESIOLOGY

## 2018-11-06 PROCEDURE — 6360000002 HC RX W HCPCS

## 2018-11-06 PROCEDURE — 1220000000 HC SEMI PRIVATE OB R&B

## 2018-11-06 PROCEDURE — S9443 LACTATION CLASS: HCPCS

## 2018-11-06 PROCEDURE — 88307 TISSUE EXAM BY PATHOLOGIST: CPT

## 2018-11-06 PROCEDURE — 7200000001 HC VAGINAL DELIVERY

## 2018-11-06 PROCEDURE — 59409 OBSTETRICAL CARE: CPT | Performed by: ADVANCED PRACTICE MIDWIFE

## 2018-11-06 RX ORDER — IBUPROFEN 800 MG/1
800 TABLET ORAL EVERY 8 HOURS SCHEDULED
Status: DISCONTINUED | OUTPATIENT
Start: 2018-11-06 | End: 2018-11-08 | Stop reason: HOSPADM

## 2018-11-06 RX ORDER — DIPHENHYDRAMINE HCL 25 MG
50 TABLET ORAL EVERY 6 HOURS PRN
Status: DISCONTINUED | OUTPATIENT
Start: 2018-11-06 | End: 2018-11-08 | Stop reason: HOSPADM

## 2018-11-06 RX ORDER — SIMETHICONE 80 MG
80 TABLET,CHEWABLE ORAL EVERY 6 HOURS PRN
Status: DISCONTINUED | OUTPATIENT
Start: 2018-11-06 | End: 2018-11-08 | Stop reason: HOSPADM

## 2018-11-06 RX ORDER — ONDANSETRON 4 MG/1
4 TABLET, FILM COATED ORAL EVERY 6 HOURS PRN
Status: DISCONTINUED | OUTPATIENT
Start: 2018-11-06 | End: 2018-11-08 | Stop reason: HOSPADM

## 2018-11-06 RX ORDER — CALCIUM CARBONATE 200(500)MG
1000 TABLET,CHEWABLE ORAL 3 TIMES DAILY PRN
Status: DISCONTINUED | OUTPATIENT
Start: 2018-11-06 | End: 2018-11-08 | Stop reason: HOSPADM

## 2018-11-06 RX ORDER — ACETAMINOPHEN 500 MG
1000 TABLET ORAL EVERY 6 HOURS PRN
Status: DISCONTINUED | OUTPATIENT
Start: 2018-11-06 | End: 2018-11-08 | Stop reason: HOSPADM

## 2018-11-06 RX ORDER — LANOLIN 100 %
OINTMENT (GRAM) TOPICAL PRN
Status: DISCONTINUED | OUTPATIENT
Start: 2018-11-06 | End: 2018-11-08 | Stop reason: HOSPADM

## 2018-11-06 RX ORDER — DOCUSATE SODIUM 100 MG/1
100 CAPSULE, LIQUID FILLED ORAL 2 TIMES DAILY
Status: DISCONTINUED | OUTPATIENT
Start: 2018-11-06 | End: 2018-11-08 | Stop reason: HOSPADM

## 2018-11-06 RX ORDER — BISACODYL 10 MG
10 SUPPOSITORY, RECTAL RECTAL DAILY PRN
Status: DISCONTINUED | OUTPATIENT
Start: 2018-11-06 | End: 2018-11-08 | Stop reason: HOSPADM

## 2018-11-06 RX ORDER — METHYLERGONOVINE MALEATE 0.2 MG/ML
200 INJECTION INTRAVENOUS ONCE
Status: COMPLETED | OUTPATIENT
Start: 2018-11-06 | End: 2018-11-06

## 2018-11-06 RX ADMIN — IBUPROFEN 800 MG: 800 TABLET, FILM COATED ORAL at 17:22

## 2018-11-06 RX ADMIN — Medication 8 ML/HR: at 05:40

## 2018-11-06 RX ADMIN — BENZOCAINE AND LEVOMENTHOL: 200; 5 SPRAY TOPICAL at 08:48

## 2018-11-06 RX ADMIN — METHYLERGONOVINE MALEATE 200 MCG: 0.2 INJECTION, SOLUTION INTRAMUSCULAR; INTRAVENOUS at 06:21

## 2018-11-06 RX ADMIN — WITCH HAZEL 40 EACH: 500 SOLUTION RECTAL; TOPICAL at 08:48

## 2018-11-06 RX ADMIN — Medication 1 MILLI-UNITS/MIN: at 03:30

## 2018-11-06 RX ADMIN — IBUPROFEN 800 MG: 800 TABLET, FILM COATED ORAL at 08:39

## 2018-11-06 ASSESSMENT — PAIN SCALES - GENERAL: PAINLEVEL_OUTOF10: 8

## 2018-11-06 NOTE — LACTATION NOTE
Assisted with awakening baby, position, and latch. Mom's nipples shy back. Using a 16 mm nipple shield and football hold on the left breast, baby latched and fed for 2-3 minutes. Reviewed feeding pattern expectations with mom. Packet of breastfeeding information given to mom. Reviewed getting the medical necessity form signed for a breast pump. Encouraged mom to call out or assistance as needed.

## 2018-11-06 NOTE — L&D DELIVERY NOTE
Mother's Information    Labor Events     labor?:  No  Rupture type:  Spontaneous=SROM  Fluid color:  Clear     Mother Delivery Information    Episiotomy:  None  Lacerations:  None  Repair Suture:  None  Vaginal Delivery Est. Blood Loss (mL):  150  Surgical or Additional Est. Blood Loss (mL):  0 (View Only):  Edit in Flowsheets   Combined Est. Blood Loss (mL):  150        [place], [facility] [[id number]]    Events of Labor     labor?:  No  Cervical ripening date/time:     Rupture date/time: 18 1800   Rupture type:  Spontaneous=SROM  Fluid color:  Clear  Fluid odor:  None  Induction:  None     Labor Event Times    Labor onset date/time: 18 1720   Dilation complete date/time:       Start pushin2018 0229   Decision time (emergent ):        Anesthesia    Method:  Epidural  Analgesics:  NUBAIN 10 MG/ML IJ SOLN     Assisted Delivery Details    Forceps attempted?:  No  Vacuum extractor attempted?:  No     Document Additional Attempt       Document Additional Attempt             Shoulder Dystocia    Shoulder dystocia present?:  No  Add Second Maneuver  Add Third Maneuver  Add Fourth Maneuver  Add Fifth Maneuver  Add Sixth Maneuver  Add Seventh Maneuver  Add Eighth Maneuver  Add Ninth Maneuver     [place] Presentation    Presentation:  Vertex  Position:  Right  _:  Occiput  _:  Anterior      Information     Changing the 's delivery date/time could affect patient care.:     Delivery date/time:   18 0600   Delivery type:  Vaginal, Spontaneous Delivery    Details:         Delivery Providers    Delivering clinician:  DENNY Rosenbaum CNM   Provider Role    Terrence Haney RN Registered Nurse    Sahil Crook DO Resident    [address] [place] [address], RN Registered Nurse      Cord    Vessels:  3 Vessels  Complications:  Nuchal  Nuchal Intervention:  reduced  Nuchal Cord Description:  loose nuchal cord  Number of Loops: 1  Delayed Cord Clamping?:  Yes  Cord Clamped Date/Time:  2018 0601  Cord Blood Disposition:  Lab  Gases Sent?:  Yes     Placenta    Date/time:  2018 1648  Removal:  Spontaneous  Appearance:  Intact  Disposition:  Lab, Pathology     Delivery Resuscitation    Method:  Bulb Suction, Stimulation     Apgars    Living status:  Living  Apgars   1 Minute:   5 Minute:   10 Minute 15 Minute 20 Minute   Skin Color: 0  1       Heart Rate: 2  2       Reflex Irritability: 2  2       Muscle Tone: 2  2       Respiratory Effort: 2  2       Total: 8  9               Apgars Assigned By:  JFELIXRN     Skin to Skin    Skin to skin initiation date/time: 18 0600   Skin to skin with: Mother  Skin to skin end date/time:         Measurements    Weight:  3090 g     Delivery Information    Episiotomy:  None  Perineal lacerations:  None    Vaginal laceration:  No    Cervical laceration:  No    Vaginal delivery est. blood loss (mL):  150  Surgical or additional est. blood loss (mL):  0 (View Only):  Edit in Flowsheets   Combined est. blood loss (mL):  150  Repair suture:  None     Vaginal Delivery Counts     4x4:   Needles:   Instruments:   Lap Pads:   Sponges:     Initial counts:          Final counts:          Final count personnel:  DR MARTÍNEZ  Final count verified by:  [facility] Vaginal Delivery Note         Pre-operative Diagnosis:  Spontaneous labor    Post-operative Diagnosis:  Same, delivered    Procedure:  Spontaneous vaginal delivery    Provider:    DENNY Calvillo CNM, Dan Mulder, DO    Information for the patient's :  Rupinder Sapp [[id number]]          Anesthesia:  epidural anesthesia    Estimated blood loss:  200ml    Specimen:  Placenta sent to pathology     Cord blood sent Yes    Complications:  Nuchal cord x1    Condition:  infant skin to skin    Details of Procedure:    The patient is a 33 y.o. female at 42w0d   OB History      Para Term  AB

## 2018-11-06 NOTE — FLOWSHEET NOTE
Eren SHEETS CRNA at bedside for epidural. Pt positioned sitting on side of bed. Consent obtained, time out [id number]. Procedure start 1913, catheter placement 1918, test [id number]. Pt positioned left lateral tilt. Pt tolerated well. Rn will continue to monitor.

## 2018-11-06 NOTE — ANESTHESIA POSTPROCEDURE EVALUATION
Department of Anesthesiology  Postprocedure Note    Patient: Shawn Ruiz  MRN: [id number]  [place]: 1990  Date of evaluation: 11/6/2018  Time:  6:48 AM     Procedure Summary     Date:  11/05/18 Room / Location:      Anesthesia Start:  1909 Anesthesia Stop:  11/06/18 0600    Procedure:  ANESTHESIA LABOR ANALGESIA Diagnosis:      Scheduled Providers:   Responsible Provider:  Jeremy Bess MD    Anesthesia Type:  epidural ASA Status:  2          Anesthesia Type: epidural    Felicitas Phase I:      Felicitas Phase II:      Last vitals: Reviewed and per EMR flowsheets.        Anesthesia Post Evaluation    Patient location during evaluation: bedside  Patient participation: complete - patient participated  Level of consciousness: awake  Pain score: 0  Airway patency: patent  Nausea & Vomiting: no vomiting and no nausea  Complications: no  Cardiovascular status: blood pressure returned to baseline  Respiratory status: acceptable  Hydration status: stable

## 2018-11-06 NOTE — LACTATION NOTE
Assisted with awakening, positioning in football hold on the left breast, and latch, using the 16 mm nipple shield. With much stimulation baby fed well for 10+ minutes. Encouraged to attempt the breast every 2-3 hours. Mom to call out for assistance as needed.

## 2018-11-07 PROCEDURE — 6370000000 HC RX 637 (ALT 250 FOR IP): Performed by: STUDENT IN AN ORGANIZED HEALTH CARE EDUCATION/TRAINING PROGRAM

## 2018-11-07 PROCEDURE — 1220000000 HC SEMI PRIVATE OB R&B

## 2018-11-07 RX ADMIN — VALACYCLOVIR 500 MG: 500 TABLET, FILM COATED ORAL at 10:01

## 2018-11-07 RX ADMIN — IBUPROFEN 800 MG: 800 TABLET, FILM COATED ORAL at 10:00

## 2018-11-07 RX ADMIN — DOCUSATE SODIUM 100 MG: 100 CAPSULE, LIQUID FILLED ORAL at 10:01

## 2018-11-07 ASSESSMENT — PAIN SCALES - GENERAL: PAINLEVEL_OUTOF10: 7

## 2018-11-07 ASSESSMENT — PAIN DESCRIPTION - DESCRIPTORS: DESCRIPTORS: BURNING;ACHING

## 2018-11-07 ASSESSMENT — PAIN DESCRIPTION - PAIN TYPE: TYPE: ACUTE PAIN

## 2018-11-07 ASSESSMENT — PAIN DESCRIPTION - LOCATION: LOCATION: BACK

## 2018-11-07 NOTE — CARE COORDINATION
Social Work    Sw met with pt at request of staff. Mom stated she was very tired, but denied any current S/s of anxiety or depression, states she has a good support system that included fob and her mother. Mom reports that she lives with fob and his 2 children ages 15 & 8. Mom reports this is her first child, she has all baby items, plans to link with WIC (states she will speak with lactation), and declined the need for any community resource referrals. Mom reports her mother lives in [place] but is on her way up to spend the week with the baby and mom. Mom denied any needs or concerns at this time. Sw encouraged mom to reach out if any issues arise.

## 2018-11-07 NOTE — LACTATION NOTE
Mom sitting in bed with baby in lap. Reports she tried to get him to breast but he is sleepy. Assisted mom to un-swaddle baby. Requested mom place nipple shield, which she did after 3 attempts. Baby did latch and suckle sporadically for several minutes. Appeared that baby not pulling nipple well into shield and no drops of colostrum noted. Shield reapplied, used few drops of formula in shield baby latched with more vigorous suck. Encouraged mom to continue at breast observing for colostrum in shield.  Mom to give baby 15ml of formula after breast.

## 2018-11-08 VITALS
SYSTOLIC BLOOD PRESSURE: 122 MMHG | OXYGEN SATURATION: 98 % | WEIGHT: 178 LBS | RESPIRATION RATE: 24 BRPM | DIASTOLIC BLOOD PRESSURE: 77 MMHG | BODY MASS INDEX: 31.54 KG/M2 | HEART RATE: 107 BPM | TEMPERATURE: 98.1 F | HEIGHT: 63 IN

## 2018-11-08 PROBLEM — Z3A.38 38 WEEKS GESTATION OF PREGNANCY: Status: RESOLVED | Noted: 2018-11-06 | Resolved: 2018-11-08

## 2018-11-08 PROBLEM — Z23 NEEDS FLU SHOT: Status: RESOLVED | Noted: 2018-10-01 | Resolved: 2018-11-08

## 2018-11-08 PROBLEM — O20.9 VAGINAL BLEEDING BEFORE 22 WEEKS GESTATION: Status: RESOLVED | Noted: 2018-03-26 | Resolved: 2018-11-08

## 2018-11-08 PROBLEM — O99.810 ABNORMAL GLUCOSE TOLERANCE AFFECTING PREGNANCY, ANTEPARTUM: Status: RESOLVED | Noted: 2018-08-20 | Resolved: 2018-11-08

## 2018-11-08 PROBLEM — Z34.90 NORMAL PREGNANCY, ANTEPARTUM: Status: RESOLVED | Noted: 2018-07-23 | Resolved: 2018-11-08

## 2018-11-08 PROBLEM — Z3A.37 37 WEEKS GESTATION OF PREGNANCY: Status: RESOLVED | Noted: 2018-11-05 | Resolved: 2018-11-08

## 2018-11-08 PROBLEM — O35.2XX0 HEREDITARY DISEASE IN FAMILY POSSIBLY AFFECTING FETUS, AFFECTING MANAGEMENT OF MOTHER, ANTEPARTUM CONDITION OR COMPLICATION: Status: RESOLVED | Noted: 2018-05-15 | Resolved: 2018-11-08

## 2018-11-08 LAB — SURGICAL PATHOLOGY REPORT: NORMAL

## 2018-11-08 PROCEDURE — 6370000000 HC RX 637 (ALT 250 FOR IP): Performed by: STUDENT IN AN ORGANIZED HEALTH CARE EDUCATION/TRAINING PROGRAM

## 2018-11-08 RX ORDER — IBUPROFEN 800 MG/1
800 TABLET ORAL EVERY 8 HOURS SCHEDULED
Qty: 60 TABLET | Refills: 0 | Status: SHIPPED | OUTPATIENT
Start: 2018-11-08 | End: 2018-11-20

## 2018-11-08 RX ORDER — PSEUDOEPHEDRINE HCL 30 MG
100 TABLET ORAL 2 TIMES DAILY
Qty: 20 CAPSULE | Refills: 0 | Status: SHIPPED | OUTPATIENT
Start: 2018-11-08 | End: 2020-01-07

## 2018-11-08 RX ADMIN — VALACYCLOVIR 500 MG: 500 TABLET, FILM COATED ORAL at 09:02

## 2018-11-08 RX ADMIN — IBUPROFEN 800 MG: 800 TABLET, FILM COATED ORAL at 09:01

## 2018-11-08 RX ADMIN — DOCUSATE SODIUM 100 MG: 100 CAPSULE, LIQUID FILLED ORAL at 09:01

## 2018-11-08 ASSESSMENT — PAIN DESCRIPTION - PAIN TYPE: TYPE: ACUTE PAIN

## 2018-11-08 ASSESSMENT — PAIN DESCRIPTION - DESCRIPTORS: DESCRIPTORS: BURNING;ACHING

## 2018-11-08 ASSESSMENT — PAIN DESCRIPTION - LOCATION: LOCATION: BACK

## 2018-11-08 ASSESSMENT — PAIN SCALES - GENERAL: PAINLEVEL_OUTOF10: 8

## 2018-11-08 NOTE — DISCHARGE SUMMARY
Obstetric Discharge Summary  [facility]    Patient Name: Facundo Phelan  Patient : 1990  Primary Care Physician: DENNY Rabago CNM  Admit Date: 2018    Principal Diagnosis: IUP at 38w0d, admitted for Onset of Labor and SROM     Her pregnancy has been complicated by:   Patient Active Problem List   Diagnosis    Vaginal bleeding before 22 weeks gestation: RESOLVED    Herpes genitalis in women    HPV in female    History of depression    Hereditary disease in family possibly affecting fetus, affecting management of mother, antepartum condition or complication    Normal pregnancy, antepartum    Abnormal glucose tolerance affecting pregnancy, antepartum    Declined flu shot 10/1    Decreased fetal movements in third trimester: RESOLVED    37 weeks gestation of pregnancy    Normal spontaneous vaginal delivery    38 weeks gestation of pregnancy       Infection Present?: No  Hospital Acquired: No    Surgical Operations & Procedures:  [] Pitocin Induction of Labor  [x] Pitocin Augmentation of Labor  [] Prostaglandin Induction of Labor  [] Mechanical Induction of Labor  [] Artificial Rupture of Membranes  [] Intrauterine Pressure Catheter  [] Fetal Scalp Electrode  [] Amnioinfusion    Analgesia: epidural  Delivery Type: Spontaneous Vaginal Delivery  Laceration(s): None    Consultations: Anesthesia    Pertinent Findings & Procedures:   Facundo Phelan is a 33 y.o. female  at 38w0d admitted for SROM and early labor; received Pitocin augmentation.  She delivered by spontaneous vaginal a Live Born      Information for the patient's :  Coit Battles [[id number]]   male  Birth Weight: 6 lb 13 oz (3.09 kg)      Apgars:   Information for the patient's :  Sarah Benitez [[id number]]          Postpartum course: normal.      Course of patient: uncomplicated    Discharge to: Home    Readmission planned: no     Recommendations on Discharge: Medications:      Hanna Flowers   Home Medication Instructions CHO:[id number]    Printed on:11/08/18 2390   Medication Information                      acetaminophen (TYLENOL) 500 MG tablet  Take 500 mg by mouth every 6 hours as needed for Pain             calcium carbonate (TUMS) 500 MG chewable tablet  Take 2 tablets by mouth daily             Dextromethorphan-guaiFENesin  MG CAPS  Take 1 capsule by mouth 3 times daily             Elastic Bandages & Supports (LUMBOSACRAL SUPPORT ELASTIC M) MISC  1 Piece by Does not apply route daily             Prenatal Vit-Fe Fumarate-FA (PRENATAL VITAMIN PO)  Take by mouth             valACYclovir (VALTREX) 500 MG tablet  Take 1 tablet by mouth 2 times daily                 Activity: Slow return to ADLs  Diet: Regular  Follow up: 2 weeks with [facility]/Dr Raquel Guerra    Condition on discharge: good    Discharge date: 11/8/2018

## 2018-11-08 NOTE — PROGRESS NOTES
OB/GYN Resident Progress Note- Late Entry      Patient continued to become more uncomfortable with contractions. Repeat SVE was performed after 2 hours and patient was found to be 10cm/ 100/ 0 station. Patient was then admitted and moved to laboring room. Spencer Borjas CNM was notified. ASSESSMENT & PLAN:  Irene Whitlock is a 33 y.o. female  at 41w10d here for spontaneous labor   - GBS negative / Rh positive / R immune   - No indication for GBS prophylaxis   - ATSO Dr. Rivas Montiel   - CBC, TPall, T&S   - UDS R/B/A discussed, consent obtained and in chart   - Cat 1 FHT, TOCO q 5-7 min   - SVE: 10/100/0 (out of 3 station)   - Anticipate vaginal delivery        Patient Active Problem List    Diagnosis Date Noted    Declined flu shot 10/1 10/01/2018     Priority: Low    37 weeks gestation of pregnancy 2018    Decreased fetal movements in third trimester: RESOLVED     Abnormal glucose tolerance affecting pregnancy, antepartum 2018     1hr 137  Order for 3 hr 71,122,117,139 WNL 1 elevated value       Normal pregnancy, antepartum 2018     Having boy desires circ  Residents OK  Vitamin K ok  Desires natural labor open to epidural     Tadp received 18      Hereditary disease in family possibly affecting fetus, affecting management of mother, antepartum condition or complication      7/3/2018 Fetal echo at 24-28 weeks due to family history; was WNL      Herpes genitalis in women 2018 No outbreaks in years, start meds at 36 weeks: (10/29) States taking meds      HPV in female 2018 Hx abn paps, last WNL, get PP      History of depression 2018     · 2018 Several years ago, no recent meds. · (10/29) EDPS= 17, states known stressor and SO supportive; declined medication      Vaginal bleeding before 22 weeks gestation: RESOLVED 2018           Plan discussed with Spencer Borjas CNM, who is agreeable.          Risks,
[facility] Vaginal Postpartum Note      SUBJECTIVE:  Voices no concerns today. Ready to go home   Doing well. Voiding, ambulating, eating without difficulty. Denies any leg pain. Bonding with baby. Resting well. Reports pain and is 8/10 on the pain scale. She reports it is controlled with oral meds. OBJECTIVE:     Vitals:  /77   Pulse 107   Temp 98.1 °F (36.7 °C) (Oral)   Resp 24   Ht 5' 3\" (1.6 m)   Wt 178 lb (80.7 kg)   LMP 01/26/2018   SpO2 98%   Breastfeeding? Unknown   BMI 31.53 kg/m²     Constitutional:  Awake, alert, cooperative, no apparent distress. Appears to be bonding well with baby, does not appear overly stressed with infant handling    Pain:  Muscular and controlled with meds    Breasts:  Soft without tenderness, nipples are intact    Abdominal Exam: Soft, non-tender, lax muscle tone                                Fundus is mid-line, firm @ U/1. Non-tender with palpation. Bladder non-distended    Perineum: Intact and healing. Anus: Normal in appearance                          Lochia: Small and Rubra    Extremities: Negative Jonyn sign. Voiding QS,    Labs:   Lab Results   Component Value Date    HGB 11.8 11/05/2018    HCT 36.9 11/05/2018       ASSESSMENT/PLAN:     Post partum vaginal birth Day 2  ·  Continue PP care    Breastfeeding with some difficulty  ·  Breast care reviewed; using shields    Rh positive/Rubella immune    Discharge instructions were reviewed regarding perineal care, breast care, activity, rest, diet, hygiene and PP depression. Questions were answered.   Discharge planned for today  RTO in 2 weeks
HGB 11.8 (L) 2018     Lab Results   Component Value Date    HCT 36.9 2018       Assessment/Plan:  1. Shamar Cote is a [id number] PPD # 1 s/p    - Doing well, VSS   - male infant in [address], circumcision desired   - Encourage ambulation   - Postpartum Hgb/Hct if symptomatic  2. Rh positive/Rubella immune  3. Breast feeding   4. Continue post partum care    Counseling Completed:  Secondary Smoke risks and Sudden Infant Death Syndrome were reviewed with recommendations. Infant sleeping, \"back to sleep\" and avoidance of co-sleeping recommendations were reviewed. Signs and Symptoms of Post Partum Depression were reviewed. The patient is to call if any occur. Signs and symptoms of Mastitis were reviewed. The patient is to call if any occur for follow up.   Discharge instructions including pelvic rest, no driving with pain medicine and office follow-up were reviewed with patient       Christiano Garcia DO  Ob/Gyn Resident   2018, 3:20 AM

## 2018-11-08 NOTE — LACTATION NOTE
Mom reports placing baby to breast several times last night, but dad also formula fed baby. Mom reports pumping a couple of time getting small amount. Plans discharge home today, will contact wic for assistance with pump. Plans to continue breastfeeding and supplementing til milk in. Reviewed normal feeding and elimination patterns. Encouraged to call for [facility] followup as needed.

## 2018-11-20 ENCOUNTER — POSTPARTUM VISIT (OUTPATIENT)
Dept: OBGYN CLINIC | Age: 28
End: 2018-11-20

## 2018-11-20 VITALS
WEIGHT: 161.06 LBS | HEART RATE: 94 BPM | DIASTOLIC BLOOD PRESSURE: 76 MMHG | BODY MASS INDEX: 28.54 KG/M2 | SYSTOLIC BLOOD PRESSURE: 113 MMHG | OXYGEN SATURATION: 98 % | HEIGHT: 63 IN

## 2018-11-20 PROCEDURE — 99024 POSTOP FOLLOW-UP VISIT: CPT | Performed by: ADVANCED PRACTICE MIDWIFE

## 2018-12-17 ENCOUNTER — POSTPARTUM VISIT (OUTPATIENT)
Dept: OBGYN CLINIC | Age: 28
End: 2018-12-17
Payer: COMMERCIAL

## 2018-12-17 VITALS
OXYGEN SATURATION: 97 % | DIASTOLIC BLOOD PRESSURE: 68 MMHG | HEART RATE: 81 BPM | BODY MASS INDEX: 27.11 KG/M2 | SYSTOLIC BLOOD PRESSURE: 105 MMHG | HEIGHT: 63 IN | WEIGHT: 153 LBS

## 2018-12-17 RX ORDER — ACETAMINOPHEN 500 MG
500 TABLET ORAL EVERY 6 HOURS PRN
COMMUNITY
End: 2020-01-07 | Stop reason: ALTCHOICE

## 2019-04-04 ENCOUNTER — OFFICE VISIT (OUTPATIENT)
Dept: OBGYN CLINIC | Age: 29
End: 2019-04-04
Payer: COMMERCIAL

## 2019-04-04 ENCOUNTER — HOSPITAL ENCOUNTER (OUTPATIENT)
Age: 29
Setting detail: SPECIMEN
Discharge: HOME | End: 2019-04-04
Payer: COMMERCIAL

## 2019-04-04 VITALS
DIASTOLIC BLOOD PRESSURE: 67 MMHG | BODY MASS INDEX: 23.5 KG/M2 | HEIGHT: 63 IN | SYSTOLIC BLOOD PRESSURE: 110 MMHG | WEIGHT: 132.6 LBS | HEART RATE: 79 BPM

## 2019-04-04 DIAGNOSIS — Z01.419 WELL WOMAN EXAM: Primary | ICD-10-CM

## 2019-04-04 DIAGNOSIS — A60.09 HERPES GENITALIS IN WOMEN: ICD-10-CM

## 2019-04-04 PROCEDURE — 99395 PREV VISIT EST AGE 18-39: CPT | Performed by: ADVANCED PRACTICE MIDWIFE

## 2019-04-04 RX ORDER — VALACYCLOVIR HYDROCHLORIDE 1 G/1
1000 TABLET, FILM COATED ORAL DAILY
Qty: 5 TABLET | Refills: 2 | Status: SHIPPED | OUTPATIENT
Start: 2019-04-04 | End: 2019-04-09

## 2019-04-04 ASSESSMENT — ENCOUNTER SYMPTOMS
VOMITING: 0
NAUSEA: 0
DIARRHEA: 0
ABDOMINAL PAIN: 0
SHORTNESS OF BREATH: 0

## 2019-04-04 NOTE — PROGRESS NOTES
[address] [place]  [address] [place] [id number]  Dept: [phone]    Patient Name: Jane Galindo  Patient Age: 33 y.o. Date of Visit: 4/4/2019    Subjective  Chief Complaint   Patient presents with    Gynecologic Exam     last pap 2/2018 per pt     Patient's last menstrual period was 03/25/2019. Arrives for annual with son  Reports life stressors. Problems at home with significant others two children. Trying to work things out. Reports pain at times is still painful, reports this started prior to having children  Reports pain eases with positional changes  Reports x2 days ago noticing area of soreness on labia minora left side. Reports wonders if its a herpes outbreak. Reports not having outbreak in years  Reports using pull out method   Reports no breast concerns  Reports menses returned in march without concern. Still pumping without concerns. Review of Systems   Constitutional: Negative for unexpected weight change. Respiratory: Negative for shortness of breath. Cardiovascular: Negative for chest pain, palpitations and leg swelling. Gastrointestinal: Negative for abdominal pain, diarrhea, nausea and vomiting. Genitourinary: Positive for genital sores. Negative for difficulty urinating, dyspareunia, menstrual problem, vaginal discharge and vaginal pain. Neurological: Negative for dizziness, light-headedness and headaches. Preventive Health Screening:   Date of last pap: ?      History of Gestational Diabetes: No     If Yes, Glucose screening ordered:No    Preventive screening: No    Family history of Breast, Ovarian, Colon or Uterine Cancer:  unknownn     If Yes see scanned worksheet    Objective  /67 (Site: Right Upper Arm, Position: Sitting, Cuff Size: Medium Adult)   Pulse 79   Ht 5' 3\" (1.6 m)   Wt 132 lb 9.6 oz (60.1 kg)   LMP 03/25/2019   Breastfeeding?  Yes   BMI 23.49 kg/m²     Gynecologic education regardingher diagnoses and her options. She was also counseled on her preventative health maintenance recommendations and follow-up.     Electronically Signed by Adrienne Dejesus

## 2019-04-16 LAB — CYTOLOGY REPORT: NORMAL

## 2019-08-15 ENCOUNTER — TELEPHONE (OUTPATIENT)
Dept: OBGYN CLINIC | Age: 29
End: 2019-08-15

## 2019-08-15 DIAGNOSIS — B00.9 HERPES: Primary | ICD-10-CM

## 2019-08-26 ENCOUNTER — TELEPHONE (OUTPATIENT)
Dept: OBGYN CLINIC | Age: 29
End: 2019-08-26

## 2019-08-26 DIAGNOSIS — B00.9 HERPES: ICD-10-CM

## 2019-12-17 ENCOUNTER — NURSE TRIAGE (OUTPATIENT)
Dept: OTHER | Facility: CLINIC | Age: 29
End: 2019-12-17

## 2019-12-17 DIAGNOSIS — N91.2 AMENORRHEA: Primary | ICD-10-CM

## 2019-12-19 ENCOUNTER — TELEPHONE (OUTPATIENT)
Dept: OBGYN CLINIC | Age: 29
End: 2019-12-19

## 2019-12-19 DIAGNOSIS — N91.2 AMENORRHEA: ICD-10-CM

## 2020-01-07 ENCOUNTER — OFFICE VISIT (OUTPATIENT)
Dept: FAMILY MEDICINE CLINIC | Age: 30
End: 2020-01-07
Payer: COMMERCIAL

## 2020-01-07 VITALS
SYSTOLIC BLOOD PRESSURE: 116 MMHG | WEIGHT: 137 LBS | BODY MASS INDEX: 24.27 KG/M2 | DIASTOLIC BLOOD PRESSURE: 64 MMHG | HEART RATE: 76 BPM

## 2020-01-07 PROCEDURE — 99204 OFFICE O/P NEW MOD 45 MIN: CPT | Performed by: NURSE PRACTITIONER

## 2020-01-07 PROCEDURE — G8427 DOCREV CUR MEDS BY ELIG CLIN: HCPCS | Performed by: NURSE PRACTITIONER

## 2020-01-07 PROCEDURE — 4004F PT TOBACCO SCREEN RCVD TLK: CPT | Performed by: NURSE PRACTITIONER

## 2020-01-07 PROCEDURE — G8420 CALC BMI NORM PARAMETERS: HCPCS | Performed by: NURSE PRACTITIONER

## 2020-01-07 PROCEDURE — G8484 FLU IMMUNIZE NO ADMIN: HCPCS | Performed by: NURSE PRACTITIONER

## 2020-01-07 ASSESSMENT — ENCOUNTER SYMPTOMS
DIARRHEA: 0
CONSTIPATION: 0
COUGH: 0
GASTROINTESTINAL NEGATIVE: 1
ALLERGIC/IMMUNOLOGIC NEGATIVE: 1
NAUSEA: 0
SHORTNESS OF BREATH: 0
ABDOMINAL PAIN: 0
EYES NEGATIVE: 1
RESPIRATORY NEGATIVE: 1
VOMITING: 0

## 2020-01-07 ASSESSMENT — PATIENT HEALTH QUESTIONNAIRE - PHQ9
SUM OF ALL RESPONSES TO PHQ9 QUESTIONS 1 & 2: 0
SUM OF ALL RESPONSES TO PHQ QUESTIONS 1-9: 0
2. FEELING DOWN, DEPRESSED OR HOPELESS: 0
SUM OF ALL RESPONSES TO PHQ QUESTIONS 1-9: 0
1. LITTLE INTEREST OR PLEASURE IN DOING THINGS: 0

## 2020-01-07 NOTE — PROGRESS NOTES
Disease Mother      Labor Mother         1 child born at 42 weeks    [facility] Diabetes Brother         unsure if due to MVA damaging his pancreaus     Spont Abortions Maternal Grandmother     Diabetes type 2  Maternal Grandmother     Anxiety Disorder Maternal Grandmother     Alcohol Abuse Father     Depression Father     Drug Abuse Father     No Known Problems Sister     Heart Failure Maternal Grandfather         COD     Heart Disease Other         COD     Breast Cancer Neg Hx     Cancer Neg Hx     Colon Cancer Neg Hx     Eclampsia Neg Hx     Hypertension Neg Hx     Ovarian Cancer Neg Hx     Stroke Neg Hx        SOCIAL HISTORY    Social History     Socioeconomic History    Marital status: Single     Spouse name: None    Number of children: 1    Years of education: None    Highest education level: None   Occupational History    None   Social Needs    Financial resource strain: None    Food insecurity:     Worry: None     Inability: None    Transportation needs:     Medical: None     Non-medical: None   Tobacco Use    Smoking status: Current Every Day Smoker     Types: Cigarettes    Smokeless tobacco: Never Used    Tobacco comment: off and on for several years    Substance and Sexual Activity    Alcohol use: No    Drug use: No    Sexual activity: Yes     Partners: Male     Comment: no BC    Lifestyle    Physical activity:     Days per week: None     Minutes per session: None    Stress: None   Relationships    Social connections:     Talks on phone: None     Gets together: None     Attends Hoahaoism service: None     Active member of club or organization: None     Attends meetings of clubs or organizations: None     Relationship status: None    Intimate partner violence:     Fear of current or ex partner: None     Emotionally abused: None     Physically abused: None     Forced sexual activity: None   Other Topics Concern    None   Social History Narrative    None       SURGICAL HISTORY    Past Surgical History:   Procedure Laterality Date    COLPOSCOPY      2 colposcopies many years ago     WISDOM TOOTH EXTRACTION         CURRENT MEDICATIONS    No current outpatient medications on file. No current facility-administered medications for this visit. ALLERGIES    Allergies   Allergen Reactions    Other Rash     Pt states there is an ADHD medication she is allergic to but could not remember the name. PHYSICAL EXAM   Physical Exam  Vitals signs and nursing note reviewed. Constitutional:       General: She is not in acute distress. Appearance: She is well-developed. She is not diaphoretic. HENT:      Head: Normocephalic. Right Ear: Hearing, tympanic membrane, ear canal and external ear normal.      Left Ear: Hearing, tympanic membrane, ear canal and external ear normal.      Nose: Nose normal. No mucosal edema. Eyes:      General:         Right eye: No discharge. Left eye: No discharge. Conjunctiva/sclera: Conjunctivae normal.      Pupils: Pupils are equal, round, and reactive to light. Pupils are equal.   Neck:      Musculoskeletal: Normal range of motion and neck supple. Thyroid: No thyromegaly. Cardiovascular:      Rate and Rhythm: Normal rate and regular rhythm. Pulses: Normal pulses. Radial pulses are 2+ on the right side and 2+ on the left side. Heart sounds: Normal heart sounds, S1 normal and S2 normal. No murmur. Pulmonary:      Effort: Pulmonary effort is normal. No respiratory distress. Breath sounds: Normal breath sounds. No wheezing. Abdominal:      General: There is no distension. Palpations: Abdomen is soft. Tenderness: There is no tenderness. Lymphadenopathy:      Cervical: No cervical adenopathy. Skin:     General: Skin is warm and dry. Findings: No erythema or rash. Neurological:      Mental Status: She is alert and oriented to person, place, and time.    Psychiatric: Moderately severe depression, 20-27 = Severe depression     Return in about 1 year (around 1/7/2021) for Nurse visit for fasting labs , Physical.      Electronically signed by DENNY Shirley CNP on 1/7/20 at 11:38 AM

## 2020-01-16 ENCOUNTER — HOSPITAL ENCOUNTER (OUTPATIENT)
Age: 30
Setting detail: SPECIMEN
Discharge: HOME | End: 2020-01-16
Payer: COMMERCIAL

## 2020-01-16 ENCOUNTER — NURSE ONLY (OUTPATIENT)
Dept: FAMILY MEDICINE CLINIC | Age: 30
End: 2020-01-16
Payer: COMMERCIAL

## 2020-01-16 LAB
ABSOLUTE EOS #: 0.37 K/UL (ref 0–0.44)
ABSOLUTE IMMATURE GRANULOCYTE: <0.03 K/UL (ref 0–0.3)
ABSOLUTE LYMPH #: 2.36 K/UL (ref 1.1–3.7)
ABSOLUTE MONO #: 0.51 K/UL (ref 0.1–1.2)
ALBUMIN SERPL-MCNC: 4.5 G/DL (ref 3.5–5.2)
ALBUMIN/GLOBULIN RATIO: 1.7 (ref 1–2.5)
ALP BLD-CCNC: 53 U/L (ref 35–104)
ALT SERPL-CCNC: 8 U/L (ref 5–33)
ANION GAP SERPL CALCULATED.3IONS-SCNC: 12 MMOL/L (ref 9–17)
AST SERPL-CCNC: 13 U/L
BASOPHILS # BLD: 1 % (ref 0–2)
BASOPHILS ABSOLUTE: 0.06 K/UL (ref 0–0.2)
BILIRUB SERPL-MCNC: 0.43 MG/DL (ref 0.3–1.2)
BUN BLDV-MCNC: 11 MG/DL (ref 6–20)
BUN/CREAT BLD: ABNORMAL (ref 9–20)
CALCIUM SERPL-MCNC: 9 MG/DL (ref 8.6–10.4)
CHLORIDE BLD-SCNC: 100 MMOL/L (ref 98–107)
CHOLESTEROL/HDL RATIO: 2.8
CHOLESTEROL: 146 MG/DL
CO2: 25 MMOL/L (ref 20–31)
CREAT SERPL-MCNC: 0.47 MG/DL (ref 0.5–0.9)
DIFFERENTIAL TYPE: ABNORMAL
EOSINOPHILS RELATIVE PERCENT: 5 % (ref 1–4)
GFR AFRICAN AMERICAN: >60 ML/MIN
GFR NON-AFRICAN AMERICAN: >60 ML/MIN
GFR SERPL CREATININE-BSD FRML MDRD: ABNORMAL ML/MIN/{1.73_M2}
GFR SERPL CREATININE-BSD FRML MDRD: ABNORMAL ML/MIN/{1.73_M2}
GLUCOSE BLD-MCNC: 84 MG/DL (ref 70–99)
HCT VFR BLD CALC: 40.3 % (ref 36.3–47.1)
HDLC SERPL-MCNC: 53 MG/DL
HEMOGLOBIN: 13 G/DL (ref 11.9–15.1)
IMMATURE GRANULOCYTES: 0 %
LDL CHOLESTEROL: 79 MG/DL (ref 0–130)
LYMPHOCYTES # BLD: 33 % (ref 24–43)
MCH RBC QN AUTO: 27.7 PG (ref 25.2–33.5)
MCHC RBC AUTO-ENTMCNC: 32.3 G/DL (ref 28.4–34.8)
MCV RBC AUTO: 85.9 FL (ref 82.6–102.9)
MONOCYTES # BLD: 7 % (ref 3–12)
NRBC AUTOMATED: 0 PER 100 WBC
PDW BLD-RTO: 13.9 % (ref 11.8–14.4)
PLATELET # BLD: 279 K/UL (ref 138–453)
PLATELET ESTIMATE: ABNORMAL
PMV BLD AUTO: 9.6 FL (ref 8.1–13.5)
POTASSIUM SERPL-SCNC: 4.2 MMOL/L (ref 3.7–5.3)
RBC # BLD: 4.69 M/UL (ref 3.95–5.11)
RBC # BLD: ABNORMAL 10*6/UL
SEG NEUTROPHILS: 54 % (ref 36–65)
SEGMENTED NEUTROPHILS ABSOLUTE COUNT: 3.9 K/UL (ref 1.5–8.1)
SODIUM BLD-SCNC: 137 MMOL/L (ref 135–144)
THYROXINE, FREE: 1.37 NG/DL (ref 0.93–1.7)
TOTAL PROTEIN: 7.2 G/DL (ref 6.4–8.3)
TRIGL SERPL-MCNC: 72 MG/DL
TSH SERPL DL<=0.05 MIU/L-ACNC: 0.88 MIU/L (ref 0.3–5)
VITAMIN B-12: 752 PG/ML (ref 232–1245)
VITAMIN D 25-HYDROXY: 33.5 NG/ML (ref 30–100)
VLDLC SERPL CALC-MCNC: NORMAL MG/DL (ref 1–30)
WBC # BLD: 7.2 K/UL (ref 3.5–11.3)
WBC # BLD: ABNORMAL 10*3/UL

## 2020-01-16 PROCEDURE — 36415 COLL VENOUS BLD VENIPUNCTURE: CPT | Performed by: NURSE PRACTITIONER

## 2020-01-17 LAB — VZV IGG SER QL IA: 2.17

## 2020-01-27 ENCOUNTER — OFFICE VISIT (OUTPATIENT)
Dept: FAMILY MEDICINE CLINIC | Age: 30
End: 2020-01-27
Payer: COMMERCIAL

## 2020-01-27 VITALS
DIASTOLIC BLOOD PRESSURE: 60 MMHG | SYSTOLIC BLOOD PRESSURE: 128 MMHG | WEIGHT: 141 LBS | HEART RATE: 84 BPM | BODY MASS INDEX: 24.98 KG/M2

## 2020-01-27 PROCEDURE — G8484 FLU IMMUNIZE NO ADMIN: HCPCS | Performed by: NURSE PRACTITIONER

## 2020-01-27 PROCEDURE — 99212 OFFICE O/P EST SF 10 MIN: CPT | Performed by: NURSE PRACTITIONER

## 2020-01-27 PROCEDURE — G8427 DOCREV CUR MEDS BY ELIG CLIN: HCPCS | Performed by: NURSE PRACTITIONER

## 2020-01-27 PROCEDURE — 81025 URINE PREGNANCY TEST: CPT | Performed by: NURSE PRACTITIONER

## 2020-01-27 PROCEDURE — G8420 CALC BMI NORM PARAMETERS: HCPCS | Performed by: NURSE PRACTITIONER

## 2020-01-27 PROCEDURE — 4004F PT TOBACCO SCREEN RCVD TLK: CPT | Performed by: NURSE PRACTITIONER

## 2020-01-27 ASSESSMENT — ENCOUNTER SYMPTOMS
RESPIRATORY NEGATIVE: 1
EYES NEGATIVE: 1
SHORTNESS OF BREATH: 0
COUGH: 0

## 2020-01-27 NOTE — PROGRESS NOTES
Diabetes Brother         unsure if due to MVA damaging his pancreaus     Spont Abortions Maternal Grandmother     Diabetes type 2  Maternal Grandmother     Anxiety Disorder Maternal Grandmother     Alcohol Abuse Father     Depression Father     Drug Abuse Father     No Known Problems Sister     Heart Failure Maternal Grandfather         COD     Heart Disease Other         COD     Breast Cancer Neg Hx     Cancer Neg Hx     Colon Cancer Neg Hx     Eclampsia Neg Hx     Hypertension Neg Hx     Ovarian Cancer Neg Hx     Stroke Neg Hx        SOCIAL HISTORY    Social History     Socioeconomic History    Marital status: Single     Spouse name: None    Number of children: 1    Years of education: None    Highest education level: None   Occupational History    None   Social Needs    Financial resource strain: None    Food insecurity:     Worry: None     Inability: None    Transportation needs:     Medical: None     Non-medical: None   Tobacco Use    Smoking status: Current Every Day Smoker     Types: Cigarettes    Smokeless tobacco: Never Used    Tobacco comment: off and on for several years    Substance and Sexual Activity    Alcohol use: No    Drug use: No    Sexual activity: Yes     Partners: Male     Comment: no BC    Lifestyle    Physical activity:     Days per week: None     Minutes per session: None    Stress: None   Relationships    Social connections:     Talks on phone: None     Gets together: None     Attends Caodaism service: None     Active member of club or organization: None     Attends meetings of clubs or organizations: None     Relationship status: None    Intimate partner violence:     Fear of current or ex partner: None     Emotionally abused: None     Physically abused: None     Forced sexual activity: None   Other Topics Concern    None   Social History Narrative    None       SURGICAL HISTORY    Past Surgical History:   Procedure Laterality Date    COLPOSCOPY      2 colposcopies many years ago     [address] [place]    No current outpatient medications on file. No current facility-administered medications for this visit. ALLERGIES    Allergies   Allergen Reactions    Other Rash     Pt states there is an ADHD medication she is allergic to but could not remember the name. PHYSICAL EXAM   Physical Exam  Vitals signs and nursing note reviewed. Constitutional:       General: She is not in acute distress. Appearance: She is well-developed. She is not diaphoretic. HENT:      Head: Normocephalic. Right Ear: Hearing normal.      Left Ear: Hearing normal.   Eyes:      General:         Right eye: No discharge. Left eye: No discharge. Pupils: Pupils are equal.   Neck:      Musculoskeletal: Normal range of motion. Cardiovascular:      Rate and Rhythm: Normal rate and regular rhythm. Pulses: Normal pulses. Radial pulses are 2+ on the right side and 2+ on the left side. Heart sounds: Normal heart sounds, S1 normal and S2 normal. No murmur. Pulmonary:      Effort: Pulmonary effort is normal. No respiratory distress. Breath sounds: Normal breath sounds. No wheezing. Skin:     General: Skin is warm and dry. Neurological:      Mental Status: She is alert and oriented to person, place, and time. Psychiatric:         Behavior: Behavior normal. Behavior is cooperative. Assessment/PLan  1. Missed menses  2. Less than 8 weeks gestation of pregnancy    - POCT HCG, Prenancy, Ur Positive   -Patient doing well with no concerns. She was actively trying to get pregnat.   -No results found for this visit on 01/27/20. [facility] received counseling on the following healthy behaviors: nutrition, exercise and medication adherence  Reviewed prior labs and health maintenance  Continue current medications, diet and exercise.   Discussed use, benefit, and side effects of prescribed medications. Barriers to medication compliance addressed. Patient given educational materials - see patient instructions  Was a self-tracking handout given in paper form or via "[employer]t? Yes    Requested Prescriptions      No prescriptions requested or ordered in this encounter       All patient questions answered. Patient voiced understanding. Quality Measures    Body mass index is 24.98 kg/m². Normal. Weight control planned discussed Healthy diet and regular exercise. BP: 128/60 Blood pressure is normal. Treatment plan consists of No treatment change needed. Lab Results   Component Value Date    LDLCHOLESTEROL 79 01/16/2020    (goal LDL reduction with dx if diabetes is 50% LDL reduction)      PHQ Scores 1/7/2020   PHQ2 Score 0   PHQ9 Score 0     Interpretation of Total Score Depression Severity: 1-4 = Minimal depression, 5-9 = Mild depression, 10-14 = Moderate depression, 15-19 = Moderately severe depression, 20-27 = Severe depression     Return if symptoms worsen or fail to improve.     Electronically signed by DENNY Sanches CNP on 1/27/20 at 4:10 PM

## 2020-01-29 ENCOUNTER — TELEPHONE (OUTPATIENT)
Dept: OBGYN CLINIC | Age: 30
End: 2020-01-29

## 2020-01-30 ENCOUNTER — HOSPITAL ENCOUNTER (OUTPATIENT)
Dept: ULTRASOUND IMAGING | Facility: CLINIC | Age: 30
Discharge: HOME | End: 2020-02-01
Payer: COMMERCIAL

## 2020-01-30 PROCEDURE — 76817 TRANSVAGINAL US OBSTETRIC: CPT

## 2020-01-30 PROCEDURE — 76801 OB US < 14 WKS SINGLE FETUS: CPT

## 2020-02-27 ENCOUNTER — INITIAL PRENATAL (OUTPATIENT)
Dept: OBGYN CLINIC | Age: 30
End: 2020-02-27
Payer: COMMERCIAL

## 2020-02-27 ENCOUNTER — HOSPITAL ENCOUNTER (OUTPATIENT)
Age: 30
Setting detail: SPECIMEN
Discharge: HOME | End: 2020-02-27
Payer: COMMERCIAL

## 2020-02-27 VITALS
HEART RATE: 87 BPM | BODY MASS INDEX: 25.08 KG/M2 | SYSTOLIC BLOOD PRESSURE: 106 MMHG | DIASTOLIC BLOOD PRESSURE: 69 MMHG | WEIGHT: 141.6 LBS

## 2020-02-27 PROBLEM — B97.7 HPV IN FEMALE: Status: RESOLVED | Noted: 2018-04-27 | Resolved: 2020-02-27

## 2020-02-27 PROBLEM — Z34.90 NORMAL REPEAT PREGNANCY, ANTEPARTUM: Status: ACTIVE | Noted: 2020-02-27

## 2020-02-27 LAB
AMPHETAMINE SCREEN URINE: NEGATIVE
BARBITURATE SCREEN URINE: NEGATIVE
BENZODIAZEPINE SCREEN, URINE: NEGATIVE
BUPRENORPHINE URINE: NORMAL
CANNABINOID SCREEN URINE: NEGATIVE
COCAINE METABOLITE, URINE: NEGATIVE
MDMA URINE: NORMAL
METHADONE SCREEN, URINE: NEGATIVE
METHAMPHETAMINE, URINE: NORMAL
OPIATES, URINE: NEGATIVE
OXYCODONE SCREEN URINE: NEGATIVE
PHENCYCLIDINE, URINE: NEGATIVE
PROPOXYPHENE, URINE: NORMAL
TEST INFORMATION: NORMAL
TRICYCLIC ANTIDEPRESSANTS, UR: NORMAL

## 2020-02-27 PROCEDURE — G8484 FLU IMMUNIZE NO ADMIN: HCPCS | Performed by: ADVANCED PRACTICE MIDWIFE

## 2020-02-27 PROCEDURE — G8419 CALC BMI OUT NRM PARAM NOF/U: HCPCS | Performed by: ADVANCED PRACTICE MIDWIFE

## 2020-02-27 PROCEDURE — 99214 OFFICE O/P EST MOD 30 MIN: CPT | Performed by: ADVANCED PRACTICE MIDWIFE

## 2020-02-27 PROCEDURE — G8427 DOCREV CUR MEDS BY ELIG CLIN: HCPCS | Performed by: ADVANCED PRACTICE MIDWIFE

## 2020-02-27 PROCEDURE — 4004F PT TOBACCO SCREEN RCVD TLK: CPT | Performed by: ADVANCED PRACTICE MIDWIFE

## 2020-02-27 NOTE — PROGRESS NOTES
for this Pregnancy:   Age greater than 28 at delivery: No   History of  delivery: no   History of diabetes (gestational or outside of pregnancy): No, On medications: NA   Screening for pregestational diabetes:   o BMI greater than 30: No. If Yes, need early glucola  o BMI greater than 25 ( Americans greater than 23): No If Yes, need 1 more risk factor.  History of Hypertension: No, On medications: NA   History of bleeding disorders: No      EPDS completed. Charted by MATTI Weeks

## 2020-02-27 NOTE — PROGRESS NOTES
[address],  [address] [facility] [address]  Dept: [phone]    New Obstetric Intake     Subjective:    Patient Filiberto Kenyon  Patient Age: 30 y.o. Date of Visit: 2/27/2020  Patient's estimated gestational age at visit: 10w0d      Any Concerns Today: no  Patient reports just very fatigued this pregnancy. She denies spotting, cramping or pain. 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 Reports this is stressful but she is coping well. See Epic Navigator for further genetic and risk screening. Objective:  /69   Pulse 87   Wt 141 lb 9.6 oz (64.2 kg)   LMP 12/19/2019   BMI 25.08 kg/m²   Body mass index is 25.08 kg/m². Physical Exam  Vitals signs reviewed. Constitutional:       General: She is not in acute distress. Appearance: She is well-developed. She is not diaphoretic. Neck:      Musculoskeletal: Normal range of motion. Thyroid: No thyromegaly. Cardiovascular:      Rate and Rhythm: Normal rate and regular rhythm. Pulmonary:      Effort: Pulmonary effort is normal. No respiratory distress. Breath sounds: Normal breath sounds. Musculoskeletal: Normal range of motion. Skin:     General: Skin is warm and dry. Neurological:      Mental Status: She is alert and oriented to person, place, and time. Psychiatric:         Behavior: Behavior normal.         Thought Content: Thought content normal.         Judgment: Judgment normal.         Mother's Ethnicity:   Father's Ethnicity:     [place] Score: 14  HITS: 4  PUQE: 3 mild NVP      Assessment/Plan:    1. Normal repeat pregnancy, antepartum   I have reviewed the RN OB intake visit and information   The patient was then seen and a full history was reviewed and completed.  As was the above documented Physical Exam   After review of information, I initiated the Problem List  The patient was counseled on drug screening, HIV, Cystic Fibrosis, SMA, FX, and Sickle Cell disease, as appropriate.   o She verbally consented to HIV testing and drug screening. o CF/SMA/Fragile X testing was offered, accepted   The patient was informed of a 2-4% risk of congenital anomalies in the general population. The patient was questioned in detail regarding any genetic misnomer history, chromosomal abnormalities, or learning disabilities in herself, the father of the baby or their families. She denied any history as stated above: Yes   Nuchal Translucency/Quad Screen and/or Single Marker MSAFP testing was reviewed with attention to timing.  She requested genetic testing.  Route of delivery and counseling on vaginal, operative vaginal, and  sections were discussed. Further counseling will be handled by the provider at subsequent visits.  The patient was informed that [facility] only delivers at  [facility] and is agreeable to delivery at [facility].  She was made aware of the Midwife Philosophy against Elective Induction.     - HIV Screen; Future  - PRENATAL PROFILE I; Future  - Culture, Urine; Future  - Urine Drug Screen; Future  - C.trachomatis N.gonorrhoeae DNA, Urine; Future  - Ramez Bacon MD, Maternal Fetal Medicine, [facility]    2. 10 weeks gestation of pregnancy  - Prenatal labs drawn at visit  - SKIP is based on LMP and confirmed with early dating ultrasound at [address] [place] [address]      3. Herpes genitalis in women  - Supressive therapy beginning at 36 weeks gestation    4. History of depression  - Reviewed this with patient in depth  - Reports has been on antidepressants in the past and tried to overdose so does not want medication therapy. - Reviewed is in therapy and does find this therapeutic.   - Reports significant other works a lot, but his family does help with his children and this has been very helpful.  Reviewed that his children do not see their mother a lot and feels they take out their anger/frustation on her, but that the kids are also in therapy and she finds this helpful too. - Will monitor EPDS every trimester and watch in the postpartum period very closely        ROS was done and is negative except as documented in HPI. History was reviewed as documented on Epic Navigator. Patient was seen with total face to face time of 25 minutes. More than 50% of this visit was on counseling and education regarding her diagnoses and her options. She was also counseled on her preventative health maintenance recommendations and follow-up. Return in about 4 weeks (around 3/26/2020) for Return OB.     Electronically Signed by: Adrienne López

## 2020-02-28 ENCOUNTER — NURSE ONLY (OUTPATIENT)
Dept: OBGYN CLINIC | Age: 30
End: 2020-02-28
Payer: COMMERCIAL

## 2020-02-28 ENCOUNTER — TELEPHONE (OUTPATIENT)
Dept: OBGYN CLINIC | Age: 30
End: 2020-02-28

## 2020-02-28 ENCOUNTER — HOSPITAL ENCOUNTER (OUTPATIENT)
Age: 30
Setting detail: SPECIMEN
Discharge: HOME | End: 2020-02-28
Payer: COMMERCIAL

## 2020-02-28 VITALS
WEIGHT: 139 LBS | HEART RATE: 100 BPM | BODY MASS INDEX: 24.62 KG/M2 | SYSTOLIC BLOOD PRESSURE: 122 MMHG | DIASTOLIC BLOOD PRESSURE: 79 MMHG

## 2020-02-28 LAB
-: ABNORMAL
AMORPHOUS: ABNORMAL
BACTERIA: ABNORMAL
BILIRUBIN URINE: NEGATIVE
C. TRACHOMATIS DNA ,URINE: NEGATIVE
CASTS UA: ABNORMAL /LPF (ref 0–8)
COLOR: ABNORMAL
CRYSTALS, UA: ABNORMAL /HPF
CULTURE: NORMAL
EPITHELIAL CELLS UA: ABNORMAL /HPF (ref 0–5)
GLUCOSE URINE: NEGATIVE
KETONES, URINE: NEGATIVE
LEUKOCYTE ESTERASE, URINE: ABNORMAL
Lab: NORMAL
MUCUS: ABNORMAL
N. GONORRHOEAE DNA, URINE: NEGATIVE
NITRITE, URINE: NEGATIVE
OTHER OBSERVATIONS UA: ABNORMAL
PH UA: 6.5 (ref 5–8)
PROTEIN UA: ABNORMAL
RBC UA: ABNORMAL /HPF (ref 0–4)
RENAL EPITHELIAL, UA: ABNORMAL /HPF
SPECIFIC GRAVITY UA: 1.02 (ref 1–1.03)
SPECIMEN DESCRIPTION: NORMAL
SPECIMEN DESCRIPTION: NORMAL
TRICHOMONAS: ABNORMAL
TURBIDITY: ABNORMAL
URINE HGB: ABNORMAL
UROBILINOGEN, URINE: NORMAL
WBC UA: ABNORMAL /HPF (ref 0–5)
YEAST: ABNORMAL

## 2020-02-28 PROCEDURE — 99212 OFFICE O/P EST SF 10 MIN: CPT | Performed by: ADVANCED PRACTICE MIDWIFE

## 2020-02-28 RX ORDER — CEPHALEXIN 500 MG/1
500 CAPSULE ORAL 2 TIMES DAILY
Qty: 14 CAPSULE | Refills: 0 | Status: SHIPPED | OUTPATIENT
Start: 2020-02-28 | End: 2020-03-06

## 2020-02-28 RX ORDER — PHENAZOPYRIDINE HYDROCHLORIDE 100 MG/1
100 TABLET, FILM COATED ORAL 3 TIMES DAILY PRN
Qty: 10 TABLET | Refills: 0 | Status: SHIPPED | OUTPATIENT
Start: 2020-02-28 | End: 2021-01-25 | Stop reason: ALTCHOICE

## 2020-02-28 NOTE — PROGRESS NOTES
Pt came to office to drop of a urine sample because of complaints of urinary frequency and painful urination. We are sending her urine for a culture and UA.

## 2020-02-28 NOTE — TELEPHONE ENCOUNTER
[facility] calls on-call CNM reporting pain with urination and some blood in her urine. States it is definitely from her urine and not vaginal. Reports this started today, has increased her water intake without relief. Has frequency but is not sure if it is from the increased water intake. States she can barely get off the toilet due to the discomfort and frequency. Denies flank pain, fever/chills, abdominal pain, cramping, and vaginal bleeding. No history of kidney stones. History of UTIs in previous pregnancy. [facility] is 10 weeks 1 day today. Order for UA & UC placed, will start on Keflex and pyridium after sample dropped off. Discussed if symptoms worsen or she develops any accompanying symptoms she needs to be seen for an appointment.

## 2020-03-01 LAB
CULTURE: ABNORMAL
Lab: ABNORMAL
SPECIMEN DESCRIPTION: ABNORMAL

## 2020-03-02 PROBLEM — A60.09 HERPES GENITALIS IN WOMEN: Chronic | Status: ACTIVE | Noted: 2018-04-27

## 2020-03-02 PROBLEM — O23.41 UTI (URINARY TRACT INFECTION) DURING PREGNANCY, FIRST TRIMESTER: Status: ACTIVE | Noted: 2020-03-02

## 2020-03-05 ENCOUNTER — TELEPHONE (OUTPATIENT)
Dept: OBGYN | Age: 30
End: 2020-03-05

## 2020-03-06 ENCOUNTER — HOSPITAL ENCOUNTER (OUTPATIENT)
Age: 30
Setting detail: SPECIMEN
Discharge: HOME | End: 2020-03-06
Payer: COMMERCIAL

## 2020-03-06 ENCOUNTER — ROUTINE PRENATAL (OUTPATIENT)
Dept: OBGYN CLINIC | Age: 30
End: 2020-03-06
Payer: COMMERCIAL

## 2020-03-06 VITALS
SYSTOLIC BLOOD PRESSURE: 114 MMHG | DIASTOLIC BLOOD PRESSURE: 67 MMHG | HEART RATE: 92 BPM | BODY MASS INDEX: 25.4 KG/M2 | WEIGHT: 143.4 LBS

## 2020-03-06 LAB
ABO/RH: NORMAL
ABSOLUTE EOS #: 0.43 K/UL (ref 0–0.44)
ABSOLUTE IMMATURE GRANULOCYTE: 0.03 K/UL (ref 0–0.3)
ABSOLUTE LYMPH #: 1.98 K/UL (ref 1.1–3.7)
ABSOLUTE MONO #: 0.6 K/UL (ref 0.1–1.2)
ANTIBODY SCREEN: NEGATIVE
BASOPHILS # BLD: 0 % (ref 0–2)
BASOPHILS ABSOLUTE: 0.03 K/UL (ref 0–0.2)
DIFFERENTIAL TYPE: ABNORMAL
EOSINOPHILS RELATIVE PERCENT: 5 % (ref 1–4)
HCT VFR BLD CALC: 38.6 % (ref 36.3–47.1)
HEMOGLOBIN: 12.8 G/DL (ref 11.9–15.1)
HEPATITIS B SURFACE ANTIGEN: NONREACTIVE
HIV AG/AB: NONREACTIVE
IMMATURE GRANULOCYTES: 0 %
LYMPHOCYTES # BLD: 24 % (ref 24–43)
MCH RBC QN AUTO: 28.6 PG (ref 25.2–33.5)
MCHC RBC AUTO-ENTMCNC: 33.2 G/DL (ref 28.4–34.8)
MCV RBC AUTO: 86.2 FL (ref 82.6–102.9)
MONOCYTES # BLD: 7 % (ref 3–12)
NRBC AUTOMATED: 0 PER 100 WBC
PDW BLD-RTO: 13 % (ref 11.8–14.4)
PLATELET # BLD: 288 K/UL (ref 138–453)
PLATELET ESTIMATE: ABNORMAL
PMV BLD AUTO: 9.8 FL (ref 8.1–13.5)
RBC # BLD: 4.48 M/UL (ref 3.95–5.11)
RBC # BLD: ABNORMAL 10*6/UL
RUBV IGG SER QL: 80.1 IU/ML
SEG NEUTROPHILS: 64 % (ref 36–65)
SEGMENTED NEUTROPHILS ABSOLUTE COUNT: 5.1 K/UL (ref 1.5–8.1)
T. PALLIDUM, IGG: NONREACTIVE
WBC # BLD: 8.2 K/UL (ref 3.5–11.3)
WBC # BLD: ABNORMAL 10*3/UL

## 2020-03-06 PROCEDURE — G8484 FLU IMMUNIZE NO ADMIN: HCPCS | Performed by: ADVANCED PRACTICE MIDWIFE

## 2020-03-06 PROCEDURE — G8419 CALC BMI OUT NRM PARAM NOF/U: HCPCS | Performed by: ADVANCED PRACTICE MIDWIFE

## 2020-03-06 PROCEDURE — G8427 DOCREV CUR MEDS BY ELIG CLIN: HCPCS | Performed by: ADVANCED PRACTICE MIDWIFE

## 2020-03-06 PROCEDURE — 1036F TOBACCO NON-USER: CPT | Performed by: ADVANCED PRACTICE MIDWIFE

## 2020-03-06 PROCEDURE — 99213 OFFICE O/P EST LOW 20 MIN: CPT | Performed by: ADVANCED PRACTICE MIDWIFE

## 2020-03-06 RX ORDER — PENICILLIN V POTASSIUM 500 MG/1
500 TABLET ORAL 3 TIMES DAILY
Qty: 21 TABLET | Refills: 0 | Status: SHIPPED | OUTPATIENT
Start: 2020-03-06 | End: 2020-03-13

## 2020-03-06 NOTE — PROGRESS NOTES
S/O:  Here for an extra visit  States she is still having urinary pain when she urinates and now having incontinence  She denies any vaginal complaints, no discharge  She denies spotting or cramping  States is finishing Pyridium and finished antibiotic  She reports SOB and fatigue    Vitals:    03/06/20 0936   BP: 114/67   Pulse: 92   Weight: 143 lb 6.4 oz (65 kg)         A/P:  IUP @ 11.1 weeks  Dysuria  - Finished medications and remains symptomatic  - Urine culture re-sent  - Ampicillin 500mg TID ordered  SOB/fatigue  - Discussed may be normal early pregnancy symptoms; she will monitor and report worsening  - No prenatal labs done but states she was drawn; discussed anemia as potential cause also  - Labs reordered today  - She will schedule follow up OB appointment    Patient was seen with total face to face time of 15 minutes. More than 50% of this visit was on counseling and education regarding her    Diagnosis Orders   1. 11 weeks gestation of pregnancy  penicillin v potassium (VEETID) 500 MG tablet    Urine Culture   2. Dysuria  penicillin v potassium (VEETID) 500 MG tablet    Urine Culture    and her options. She was also counseled on her preventative health maintenance recommendations and follow-up.

## 2020-03-07 LAB
CULTURE: NORMAL
Lab: NORMAL
SPECIMEN DESCRIPTION: NORMAL

## 2020-03-16 ENCOUNTER — ROUTINE PRENATAL (OUTPATIENT)
Dept: PERINATAL CARE | Age: 30
End: 2020-03-16
Payer: COMMERCIAL

## 2020-03-16 VITALS
DIASTOLIC BLOOD PRESSURE: 68 MMHG | HEART RATE: 78 BPM | SYSTOLIC BLOOD PRESSURE: 112 MMHG | RESPIRATION RATE: 16 BRPM | HEIGHT: 63 IN | TEMPERATURE: 98 F | WEIGHT: 143 LBS | BODY MASS INDEX: 25.34 KG/M2

## 2020-03-16 PROCEDURE — 76813 OB US NUCHAL MEAS 1 GEST: CPT | Performed by: OBSTETRICS & GYNECOLOGY

## 2020-03-16 PROCEDURE — 76801 OB US < 14 WKS SINGLE FETUS: CPT | Performed by: OBSTETRICS & GYNECOLOGY

## 2020-03-26 ENCOUNTER — ROUTINE PRENATAL (OUTPATIENT)
Dept: OBGYN CLINIC | Age: 30
End: 2020-03-26
Payer: COMMERCIAL

## 2020-03-26 VITALS
DIASTOLIC BLOOD PRESSURE: 71 MMHG | HEART RATE: 93 BPM | WEIGHT: 144.2 LBS | SYSTOLIC BLOOD PRESSURE: 116 MMHG | BODY MASS INDEX: 25.54 KG/M2

## 2020-03-26 PROCEDURE — 99212 OFFICE O/P EST SF 10 MIN: CPT | Performed by: ADVANCED PRACTICE MIDWIFE

## 2020-03-26 RX ORDER — ONDANSETRON 4 MG/1
4 TABLET, FILM COATED ORAL EVERY 8 HOURS PRN
Qty: 15 TABLET | Refills: 0 | Status: SHIPPED | OUTPATIENT
Start: 2020-03-26 | End: 2021-01-25

## 2020-03-26 NOTE — PROGRESS NOTES
SUBJECTIVE:    Manoj is here for her return OB visit. Reports having still significant events of nausea and some light headedness  She denies  feeling fetal movement. She denies vaginal bleeding. She denies vaginal discharge. She denies leaking of fluid. She denies uterine cramping. She denies  nausea and/or vomiting. OBJECTIVE:  Blood pressure 116/71, pulse 93, weight 144 lb 3.2 oz (65.4 kg), last menstrual period 12/19/2019, currently breastfeeding. Total weight gain: 3 lb 3.2 oz (1.452 kg)      Manoj has not received the Tdap vaccine as appropriate    ASSESSMENT/PLAN:  IUP @ 14+0 weeks  S =D    1. Normal repeat pregnancy, antepartum    2. 14 weeks gestation of pregnancy  - Reviewed nausea and zofran sent to pharmacy. Reviewed weight gain  - Reviewed light headedness ask patient if she ate today with response \"I don't know\". Reviewed importance of nutrition and hydration. Recommended increasing fluid intake. To call office with any concerns  - Prenatal labs completed  - Dating has been established  - Anatomy scan scheduled    3. UTI (urinary tract infection) during pregnancy, first trimester  - 2/8 E.colo  -3/6 negative          She was counseled regarding all of the above:    Return in about 6 weeks (around 5/7/2020) for Return OB. Patient was seen with total face to face time of 15 minutes. More than 50% of this visit was education and counseling.     Electronically Signed By: Adrienne Tanner

## 2020-05-11 ENCOUNTER — ROUTINE PRENATAL (OUTPATIENT)
Dept: PERINATAL CARE | Age: 30
End: 2020-05-11
Payer: COMMERCIAL

## 2020-05-11 VITALS
BODY MASS INDEX: 26.75 KG/M2 | WEIGHT: 151 LBS | SYSTOLIC BLOOD PRESSURE: 118 MMHG | RESPIRATION RATE: 16 BRPM | HEIGHT: 63 IN | TEMPERATURE: 98.3 F | HEART RATE: 100 BPM | DIASTOLIC BLOOD PRESSURE: 70 MMHG

## 2020-05-11 PROCEDURE — 76811 OB US DETAILED SNGL FETUS: CPT | Performed by: OBSTETRICS & GYNECOLOGY

## 2020-05-11 PROCEDURE — 76817 TRANSVAGINAL US OBSTETRIC: CPT | Performed by: OBSTETRICS & GYNECOLOGY

## 2020-05-11 RX ORDER — CALCIUM CARBONATE 750 MG/1
2 TABLET, CHEWABLE ORAL DAILY
COMMUNITY
End: 2020-10-13 | Stop reason: ALTCHOICE

## 2020-06-22 ENCOUNTER — ROUTINE PRENATAL (OUTPATIENT)
Dept: PERINATAL CARE | Age: 30
End: 2020-06-22
Payer: COMMERCIAL

## 2020-06-22 ENCOUNTER — ROUTINE PRENATAL (OUTPATIENT)
Dept: OBGYN CLINIC | Age: 30
End: 2020-06-22
Payer: COMMERCIAL

## 2020-06-22 ENCOUNTER — HOSPITAL ENCOUNTER (OUTPATIENT)
Age: 30
Setting detail: SPECIMEN
Discharge: HOME | End: 2020-06-22
Payer: COMMERCIAL

## 2020-06-22 VITALS
DIASTOLIC BLOOD PRESSURE: 80 MMHG | SYSTOLIC BLOOD PRESSURE: 121 MMHG | BODY MASS INDEX: 27.81 KG/M2 | WEIGHT: 157 LBS | HEART RATE: 120 BPM

## 2020-06-22 VITALS
WEIGHT: 157 LBS | RESPIRATION RATE: 16 BRPM | BODY MASS INDEX: 27.82 KG/M2 | HEIGHT: 63 IN | HEART RATE: 95 BPM | TEMPERATURE: 97.2 F | DIASTOLIC BLOOD PRESSURE: 72 MMHG | SYSTOLIC BLOOD PRESSURE: 119 MMHG

## 2020-06-22 LAB
ABSOLUTE EOS #: 0.42 K/UL (ref 0–0.44)
ABSOLUTE IMMATURE GRANULOCYTE: 0.09 K/UL (ref 0–0.3)
ABSOLUTE LYMPH #: 2.09 K/UL (ref 1.1–3.7)
ABSOLUTE MONO #: 0.69 K/UL (ref 0.1–1.2)
BASOPHILS # BLD: 0 % (ref 0–2)
BASOPHILS ABSOLUTE: 0.04 K/UL (ref 0–0.2)
DIFFERENTIAL TYPE: ABNORMAL
EOSINOPHILS RELATIVE PERCENT: 4 % (ref 1–4)
GLUCOSE ADMINISTRATION: ABNORMAL
GLUCOSE TOLERANCE SCREEN 50G: 147 MG/DL (ref 70–135)
HCT VFR BLD CALC: 35.1 % (ref 36.3–47.1)
HEMOGLOBIN: 11.2 G/DL (ref 11.9–15.1)
IMMATURE GRANULOCYTES: 1 %
LYMPHOCYTES # BLD: 20 % (ref 24–43)
MCH RBC QN AUTO: 28.2 PG (ref 25.2–33.5)
MCHC RBC AUTO-ENTMCNC: 31.9 G/DL (ref 28.4–34.8)
MCV RBC AUTO: 88.4 FL (ref 82.6–102.9)
MONOCYTES # BLD: 7 % (ref 3–12)
NRBC AUTOMATED: 0 PER 100 WBC
PDW BLD-RTO: 13.8 % (ref 11.8–14.4)
PLATELET # BLD: 290 K/UL (ref 138–453)
PLATELET ESTIMATE: ABNORMAL
PMV BLD AUTO: 9.7 FL (ref 8.1–13.5)
RBC # BLD: 3.97 M/UL (ref 3.95–5.11)
RBC # BLD: ABNORMAL 10*6/UL
SEG NEUTROPHILS: 68 % (ref 36–65)
SEGMENTED NEUTROPHILS ABSOLUTE COUNT: 7.04 K/UL (ref 1.5–8.1)
WBC # BLD: 10.4 K/UL (ref 3.5–11.3)
WBC # BLD: ABNORMAL 10*3/UL

## 2020-06-22 PROCEDURE — 76825 ECHO EXAM OF FETAL HEART: CPT | Performed by: OBSTETRICS & GYNECOLOGY

## 2020-06-22 PROCEDURE — 99213 OFFICE O/P EST LOW 20 MIN: CPT | Performed by: ADVANCED PRACTICE MIDWIFE

## 2020-06-22 PROCEDURE — 93325 DOPPLER ECHO COLOR FLOW MAPG: CPT | Performed by: OBSTETRICS & GYNECOLOGY

## 2020-06-22 PROCEDURE — 1036F TOBACCO NON-USER: CPT | Performed by: ADVANCED PRACTICE MIDWIFE

## 2020-06-22 PROCEDURE — 76827 ECHO EXAM OF FETAL HEART: CPT | Performed by: OBSTETRICS & GYNECOLOGY

## 2020-06-22 PROCEDURE — G8427 DOCREV CUR MEDS BY ELIG CLIN: HCPCS | Performed by: ADVANCED PRACTICE MIDWIFE

## 2020-06-22 PROCEDURE — G8419 CALC BMI OUT NRM PARAM NOF/U: HCPCS | Performed by: ADVANCED PRACTICE MIDWIFE

## 2020-06-22 PROCEDURE — 76816 OB US FOLLOW-UP PER FETUS: CPT | Performed by: OBSTETRICS & GYNECOLOGY

## 2020-06-22 RX ORDER — ACETAMINOPHEN 500 MG
500 TABLET ORAL EVERY 6 HOURS PRN
COMMUNITY

## 2020-06-23 PROBLEM — O99.810 ABNORMAL GLUCOSE TOLERANCE TEST IN PREGNANCY, ANTEPARTUM: Status: ACTIVE | Noted: 2020-06-23

## 2020-07-20 ENCOUNTER — PATIENT MESSAGE (OUTPATIENT)
Dept: OBGYN CLINIC | Age: 30
End: 2020-07-20

## 2020-07-21 ENCOUNTER — ROUTINE PRENATAL (OUTPATIENT)
Dept: OBGYN CLINIC | Age: 30
End: 2020-07-21
Payer: COMMERCIAL

## 2020-07-21 VITALS
WEIGHT: 162.8 LBS | SYSTOLIC BLOOD PRESSURE: 120 MMHG | DIASTOLIC BLOOD PRESSURE: 78 MMHG | BODY MASS INDEX: 28.84 KG/M2 | TEMPERATURE: 97.5 F | HEART RATE: 102 BPM

## 2020-07-21 PROCEDURE — 1036F TOBACCO NON-USER: CPT | Performed by: ADVANCED PRACTICE MIDWIFE

## 2020-07-21 PROCEDURE — 99213 OFFICE O/P EST LOW 20 MIN: CPT | Performed by: ADVANCED PRACTICE MIDWIFE

## 2020-07-21 PROCEDURE — G8419 CALC BMI OUT NRM PARAM NOF/U: HCPCS | Performed by: ADVANCED PRACTICE MIDWIFE

## 2020-07-21 PROCEDURE — 90715 TDAP VACCINE 7 YRS/> IM: CPT | Performed by: ADVANCED PRACTICE MIDWIFE

## 2020-07-21 PROCEDURE — 90471 IMMUNIZATION ADMIN: CPT | Performed by: ADVANCED PRACTICE MIDWIFE

## 2020-07-21 PROCEDURE — G8427 DOCREV CUR MEDS BY ELIG CLIN: HCPCS | Performed by: ADVANCED PRACTICE MIDWIFE

## 2020-07-21 NOTE — PROGRESS NOTES
After obtaining consent, and per orders of [facility], injection of TDAP 0.5ml given in Left deltoid by Kari Acevedo. Patient instructed to remain in clinic for 20 minutes afterwards, and to report any adverse reaction to me immediately.     TMA:[id number]  KCI:[id number]  PYO:40/37/60

## 2020-07-21 NOTE — PROGRESS NOTES
SUBJECTIVE:  Manoj is here for her return OB visit. She reports fetal movement. She denies  vaginal bleeding. She denies  vaginal discharge. She denies leaking of fluid. She denies uterine contraction activity. She denies nausea and/or vomiting. She denies retaining fluid in her extremities. Reports random numbness. Reports occasional will feels her left or right arm go number. Denies pain or muscle weakness. Denies other concerns today. OBJECTIVE:  Blood pressure 120/78, pulse 102, temperature 97.5 °F (36.4 °C), temperature source Temporal, weight 162 lb 12.8 oz (73.8 kg), last menstrual period 2019, currently breastfeeding. Manoj has received the Tdap vaccine as appropriate        ASSESSMENT/PLAN:  1. Normal repeat pregnancy, antepartum  IUP at 30w5d  S=D  Discussed normal discomforts of pregnancy, recommended chiropractor for numbness, warning signs reviewed  Plans to see Dr. Wing Goldberg for pelvic pain consult    Needs Nitrous oxide consent    2. 30 weeks gestation of pregnancy      3. Abnormal glucose tolerance test in pregnancy, antepartum  -discussed obtaining 3hour ASAP    4. Herpes genitalis in women  -begin suppression at 36 weeks, pt agreeable        The problem list was reviewed and updated with any new issues from today's visit      VladimirFresno will monitor fetal movement daily. 28 week lab results were reviewed. Fetal Kick Count was discussed and explained. Hall-Wilde contractions vs  labor contractions were reviewed. Signs and symptoms of Pre-Eclampsia were were reviewed and discussed  Initial discussion regarding birth plans was begun    VladimirFresno was counseled regarding all of the above    Patient was seen with total face to face time of 15 minutes. More than 50% of this visit was for education and counseling.

## 2020-07-21 NOTE — PROGRESS NOTES
Patient is due for her 3 hour Glucose that she is going to schedule before she leaves. Patient is in the office today complaining of numbness throughout body. Patient states no pain, tingling, or loss of movement.      Son's temp: 97.0

## 2020-07-23 ENCOUNTER — HOSPITAL ENCOUNTER (OUTPATIENT)
Age: 30
Setting detail: SPECIMEN
Discharge: HOME | End: 2020-07-23
Payer: COMMERCIAL

## 2020-07-23 ENCOUNTER — NURSE ONLY (OUTPATIENT)
Dept: OBGYN CLINIC | Age: 30
End: 2020-07-23
Payer: COMMERCIAL

## 2020-07-23 LAB
3 HR GLUCOSE: 109 MG/DL (ref 65–139)
AMOUNT GLUCOSE GIVEN: 100 G
GLUCOSE FASTING: 78 MG/DL (ref 65–94)
GLUCOSE TOLERANCE TEST 1 HOUR: 103 MG/DL (ref 65–179)
GLUCOSE TOLERANCE TEST 2 HOUR: 99 MG/DL (ref 65–154)

## 2020-07-23 PROCEDURE — 36415 COLL VENOUS BLD VENIPUNCTURE: CPT | Performed by: ADVANCED PRACTICE MIDWIFE

## 2020-07-23 NOTE — PROGRESS NOTES
Performed venipuncture of the RT arm in antecube. Pt tolerated well. Blood Flow obtained. Collected x-SST _ [email or web address]     Performed venipuncture of the RT arm in antecube. Pt tolerated well. Blood Flow obtained. Collected x -SST _ -Lav@ 11:09am    Performed venipuncture of the RT arm in antecube. Pt tolerated well. Blood Flow obtained.   Collected x -SST _ -Lav @12:10pm

## 2020-08-04 ENCOUNTER — ROUTINE PRENATAL (OUTPATIENT)
Dept: OBGYN CLINIC | Age: 30
End: 2020-08-04
Payer: COMMERCIAL

## 2020-08-04 VITALS
SYSTOLIC BLOOD PRESSURE: 123 MMHG | HEART RATE: 110 BPM | BODY MASS INDEX: 29.58 KG/M2 | DIASTOLIC BLOOD PRESSURE: 79 MMHG | WEIGHT: 167 LBS

## 2020-08-04 PROBLEM — Z23 NEED FOR TDAP VACCINATION: Status: ACTIVE | Noted: 2020-08-04

## 2020-08-04 PROCEDURE — 99213 OFFICE O/P EST LOW 20 MIN: CPT | Performed by: ADVANCED PRACTICE MIDWIFE

## 2020-08-04 PROCEDURE — G8419 CALC BMI OUT NRM PARAM NOF/U: HCPCS | Performed by: ADVANCED PRACTICE MIDWIFE

## 2020-08-04 PROCEDURE — 1036F TOBACCO NON-USER: CPT | Performed by: ADVANCED PRACTICE MIDWIFE

## 2020-08-04 PROCEDURE — G8427 DOCREV CUR MEDS BY ELIG CLIN: HCPCS | Performed by: ADVANCED PRACTICE MIDWIFE

## 2020-08-18 ENCOUNTER — ROUTINE PRENATAL (OUTPATIENT)
Dept: OBGYN CLINIC | Age: 30
End: 2020-08-18
Payer: COMMERCIAL

## 2020-08-18 VITALS
SYSTOLIC BLOOD PRESSURE: 113 MMHG | HEART RATE: 108 BPM | BODY MASS INDEX: 29.76 KG/M2 | DIASTOLIC BLOOD PRESSURE: 71 MMHG | WEIGHT: 168 LBS

## 2020-08-18 PROCEDURE — 99213 OFFICE O/P EST LOW 20 MIN: CPT | Performed by: ADVANCED PRACTICE MIDWIFE

## 2020-08-18 PROCEDURE — G8427 DOCREV CUR MEDS BY ELIG CLIN: HCPCS | Performed by: ADVANCED PRACTICE MIDWIFE

## 2020-08-18 PROCEDURE — 1036F TOBACCO NON-USER: CPT | Performed by: ADVANCED PRACTICE MIDWIFE

## 2020-08-18 PROCEDURE — G8419 CALC BMI OUT NRM PARAM NOF/U: HCPCS | Performed by: ADVANCED PRACTICE MIDWIFE

## 2020-08-18 RX ORDER — VALACYCLOVIR HYDROCHLORIDE 500 MG/1
500 TABLET, FILM COATED ORAL 2 TIMES DAILY
Qty: 60 TABLET | Refills: 1 | Status: SHIPPED | OUTPATIENT
Start: 2020-08-18 | End: 2021-01-25

## 2020-08-18 NOTE — PROGRESS NOTES
SUBJECTIVE:    Manoj is here for her return OB visit. Doling well overall ready for baby girl to be here. Reports continued stressors at home with significant other due to two step children/behaviors. She reports  feeling fetal movement. She denies vaginal bleeding. She denies vaginal discharge. She denies leaking of fluid. She denies uterine cramping. She denies  nausea and/or vomiting. OBJECTIVE:  Blood pressure 113/71, pulse 108, weight 168 lb (76.2 kg), last menstrual period 2019, currently breastfeeding. Total weight gain: 27 lb (12.2 kg)       Manoj has not received the Tdap vaccine as appropriate. Offer at next office visit    ASSESSMENT/PLAN:  IUP @ 34+5 weeks  S =D    1. Normal repeat pregnancy, antepartum    2. 34 weeks gestation of pregnancy  - GBS at 36 weeks  - Reviewed and reinforced fetal kick counts  - Reviewed signs and symptoms of  labor, preeclampsia, and alberta hick contractions  - Reviewed birth preferences  - Has surgical consult with physicians   - Chart has previously been reviewed by collaborating physician   - Reviewed home stressors, emotional support given    3. Herpes genitalis in women  - Suppressive therapy at 36 weeks  - valACYclovir (VALTREX) 500 MG tablet; Take 1 tablet by mouth 2 times daily  Dispense: 60 tablet; Refill: 1          She was counseled regarding all of the above:    Return in about 2 weeks (around 2020) for Return OB & GBS. Patient was seen with total face to face time of 15 minutes. More than 50% of this visit was education and counseling.     Electronically Signed By: Adrienne Bloom

## 2020-08-25 ENCOUNTER — ROUTINE PRENATAL (OUTPATIENT)
Dept: OBGYN CLINIC | Age: 30
End: 2020-08-25
Payer: COMMERCIAL

## 2020-08-25 ENCOUNTER — HOSPITAL ENCOUNTER (OUTPATIENT)
Age: 30
Setting detail: SPECIMEN
Discharge: HOME | End: 2020-08-25
Payer: COMMERCIAL

## 2020-08-25 VITALS
HEART RATE: 109 BPM | SYSTOLIC BLOOD PRESSURE: 127 MMHG | BODY MASS INDEX: 30.47 KG/M2 | WEIGHT: 172 LBS | DIASTOLIC BLOOD PRESSURE: 77 MMHG

## 2020-08-25 PROCEDURE — G8427 DOCREV CUR MEDS BY ELIG CLIN: HCPCS | Performed by: ADVANCED PRACTICE MIDWIFE

## 2020-08-25 PROCEDURE — 99213 OFFICE O/P EST LOW 20 MIN: CPT | Performed by: ADVANCED PRACTICE MIDWIFE

## 2020-08-25 PROCEDURE — 1036F TOBACCO NON-USER: CPT | Performed by: ADVANCED PRACTICE MIDWIFE

## 2020-08-25 PROCEDURE — G8419 CALC BMI OUT NRM PARAM NOF/U: HCPCS | Performed by: ADVANCED PRACTICE MIDWIFE

## 2020-08-25 SDOH — ECONOMIC STABILITY: TRANSPORTATION INSECURITY
IN THE PAST 12 MONTHS, HAS LACK OF TRANSPORTATION KEPT YOU FROM MEETINGS, WORK, OR FROM GETTING THINGS NEEDED FOR DAILY LIVING?: NO

## 2020-08-25 SDOH — ECONOMIC STABILITY: FOOD INSECURITY: WITHIN THE PAST 12 MONTHS, YOU WORRIED THAT YOUR FOOD WOULD RUN OUT BEFORE YOU GOT MONEY TO BUY MORE.: NEVER TRUE

## 2020-08-25 SDOH — ECONOMIC STABILITY: TRANSPORTATION INSECURITY
IN THE PAST 12 MONTHS, HAS THE LACK OF TRANSPORTATION KEPT YOU FROM MEDICAL APPOINTMENTS OR FROM GETTING MEDICATIONS?: NO

## 2020-08-25 SDOH — ECONOMIC STABILITY: FOOD INSECURITY: WITHIN THE PAST 12 MONTHS, THE FOOD YOU BOUGHT JUST DIDN'T LAST AND YOU DIDN'T HAVE MONEY TO GET MORE.: NEVER TRUE

## 2020-08-25 SDOH — ECONOMIC STABILITY: INCOME INSECURITY: HOW HARD IS IT FOR YOU TO PAY FOR THE VERY BASICS LIKE FOOD, HOUSING, MEDICAL CARE, AND HEATING?: NOT VERY HARD

## 2020-08-25 NOTE — PROGRESS NOTES
SUBJECTIVE:    Manoj is here for her return OB visit. Doing well, step children are with their mother and this has been good for her. Just waiting. She reports  feeling fetal movement. She denies vaginal bleeding. She denies vaginal discharge. She denies leaking of fluid. She denies uterine cramping. She denies  nausea and/or vomiting. OBJECTIVE:  Blood pressure 127/77, pulse 109, weight 172 lb (78 kg), last menstrual period 2019, currently breastfeeding. Total weight gain: 31 lb (14.1 kg)     EPDS: 7  HITS: negative (4)    Manoj has not received the Tdap vaccine as appropriate    ASSESSMENT/PLAN:  IUP @ 35+5 weeks  S =D    1. Normal repeat pregnancy, antepartum      2. 35 weeks gestation of pregnancy  - GBS collected at visit by provider  - Culture, Strep B Screen, Vaginal/Rectal; Future  - reviewed and reinforced fetal kick counts  - Reviewed birth preferences  - Reviewed signs and symptoms of  labor, preeclampsia, and alberta hick contractions  - Discussed decreased stressors in home, emotional support given  - chart has been previously sent to collaborating physician for review    3. Herpes genitalis in women  - On suppressive therapy          She was counseled regarding all of the above:    Return in about 1 week (around 2020) for Return OB. Patient was seen with total face to face time of 15 minutes. More than 50% of this visit was education and counseling.     Electronically Signed By: Adrienne Day

## 2020-08-28 LAB
CULTURE: NORMAL
Lab: NORMAL
SPECIMEN DESCRIPTION: NORMAL

## 2020-09-01 ENCOUNTER — ROUTINE PRENATAL (OUTPATIENT)
Dept: OBGYN CLINIC | Age: 30
End: 2020-09-01
Payer: COMMERCIAL

## 2020-09-01 VITALS
WEIGHT: 174 LBS | BODY MASS INDEX: 30.82 KG/M2 | SYSTOLIC BLOOD PRESSURE: 105 MMHG | HEART RATE: 94 BPM | DIASTOLIC BLOOD PRESSURE: 72 MMHG

## 2020-09-01 PROCEDURE — 1036F TOBACCO NON-USER: CPT | Performed by: ADVANCED PRACTICE MIDWIFE

## 2020-09-01 PROCEDURE — G8419 CALC BMI OUT NRM PARAM NOF/U: HCPCS | Performed by: ADVANCED PRACTICE MIDWIFE

## 2020-09-01 PROCEDURE — 99213 OFFICE O/P EST LOW 20 MIN: CPT | Performed by: ADVANCED PRACTICE MIDWIFE

## 2020-09-01 PROCEDURE — G8427 DOCREV CUR MEDS BY ELIG CLIN: HCPCS | Performed by: ADVANCED PRACTICE MIDWIFE

## 2020-09-01 NOTE — PROGRESS NOTES
SUBJECTIVE:    Manoj is here for her return OB visit. Denies any concerns ready for labor. She reports  feeling fetal movement. She denies vaginal bleeding. She denies vaginal discharge. She denies leaking of fluid. She denies uterine cramping. She denies  nausea and/or vomiting. OBJECTIVE:  Blood pressure 105/72, pulse 94, weight 174 lb (78.9 kg), last menstrual period 12/19/2019, currently breastfeeding. Total weight gain: 33 lb (15 kg)    Manoj has received the Tdap vaccine as appropriate    ASSESSMENT/PLAN:  IUP @ 36+5 weeks  S =D    1. Normal repeat pregnancy, antepartum    2. 36 weeks gestation of pregnancy  - GBS negative  - Reviewed and reinforced fetal kick counts  - Reviewed signs and symptoms of labor, preeclampsia, and alberta hick contractions  - Reviewed birth preferences  - Chart has previously been reviewed by collaborating physicians     3. Herpes genitalis in women  - On suppressive therapy           She was counseled regarding all of the above:    Return in about 1 week (around 9/8/2020) for Return OB. Patient was seen with total face to face time of 15 minutes. More than 50% of this visit was education and counseling.     Electronically Signed By: Adrienne Whiteside

## 2020-09-08 ENCOUNTER — ROUTINE PRENATAL (OUTPATIENT)
Dept: OBGYN CLINIC | Age: 30
End: 2020-09-08
Payer: COMMERCIAL

## 2020-09-08 VITALS
BODY MASS INDEX: 30.82 KG/M2 | DIASTOLIC BLOOD PRESSURE: 79 MMHG | WEIGHT: 174 LBS | SYSTOLIC BLOOD PRESSURE: 123 MMHG | HEART RATE: 99 BPM

## 2020-09-08 PROCEDURE — 1036F TOBACCO NON-USER: CPT | Performed by: ADVANCED PRACTICE MIDWIFE

## 2020-09-08 PROCEDURE — G8427 DOCREV CUR MEDS BY ELIG CLIN: HCPCS | Performed by: ADVANCED PRACTICE MIDWIFE

## 2020-09-08 PROCEDURE — 99213 OFFICE O/P EST LOW 20 MIN: CPT | Performed by: ADVANCED PRACTICE MIDWIFE

## 2020-09-08 PROCEDURE — G8419 CALC BMI OUT NRM PARAM NOF/U: HCPCS | Performed by: ADVANCED PRACTICE MIDWIFE

## 2020-09-13 ENCOUNTER — HOSPITAL ENCOUNTER (OUTPATIENT)
Age: 30
Discharge: HOME | End: 2020-09-13
Attending: OBSTETRICS & GYNECOLOGY | Admitting: OBSTETRICS & GYNECOLOGY
Payer: COMMERCIAL

## 2020-09-13 VITALS
HEART RATE: 101 BPM | TEMPERATURE: 97.5 F | HEIGHT: 63 IN | BODY MASS INDEX: 30.83 KG/M2 | DIASTOLIC BLOOD PRESSURE: 75 MMHG | WEIGHT: 174 LBS | RESPIRATION RATE: 18 BRPM | SYSTOLIC BLOOD PRESSURE: 124 MMHG

## 2020-09-13 PROBLEM — Z3A.38 38 WEEKS GESTATION OF PREGNANCY: Status: ACTIVE | Noted: 2020-09-13

## 2020-09-13 LAB
-: ABNORMAL
AMORPHOUS: ABNORMAL
BACTERIA: ABNORMAL
BILIRUBIN URINE: NEGATIVE
CASTS UA: ABNORMAL /LPF (ref 0–8)
COLOR: YELLOW
COMMENT UA: ABNORMAL
CRYSTALS, UA: ABNORMAL /HPF
EPITHELIAL CELLS UA: ABNORMAL /HPF (ref 0–5)
GLUCOSE URINE: ABNORMAL
KETONES, URINE: ABNORMAL
LEUKOCYTE ESTERASE, URINE: ABNORMAL
MUCUS: ABNORMAL
NITRITE, URINE: NEGATIVE
OTHER OBSERVATIONS UA: ABNORMAL
PH UA: 7 (ref 5–8)
PROTEIN UA: NEGATIVE
RBC UA: ABNORMAL /HPF (ref 0–4)
RENAL EPITHELIAL, UA: ABNORMAL /HPF
SPECIFIC GRAVITY UA: 1.01 (ref 1–1.03)
TRICHOMONAS: ABNORMAL
TURBIDITY: ABNORMAL
URINE HGB: NEGATIVE
UROBILINOGEN, URINE: NORMAL
WBC UA: ABNORMAL /HPF (ref 0–5)
YEAST: ABNORMAL

## 2020-09-13 PROCEDURE — 99213 OFFICE O/P EST LOW 20 MIN: CPT

## 2020-09-13 PROCEDURE — 81001 URINALYSIS AUTO W/SCOPE: CPT

## 2020-09-13 PROCEDURE — 99234 HOSP IP/OBS SM DT SF/LOW 45: CPT | Performed by: OBSTETRICS & GYNECOLOGY

## 2020-09-13 PROCEDURE — 6370000000 HC RX 637 (ALT 250 FOR IP): Performed by: STUDENT IN AN ORGANIZED HEALTH CARE EDUCATION/TRAINING PROGRAM

## 2020-09-13 PROCEDURE — 87086 URINE CULTURE/COLONY COUNT: CPT

## 2020-09-13 RX ORDER — PROMETHAZINE HYDROCHLORIDE 12.5 MG/1
12.5 TABLET ORAL EVERY 6 HOURS PRN
Status: DISCONTINUED | OUTPATIENT
Start: 2020-09-13 | End: 2020-09-13 | Stop reason: HOSPADM

## 2020-09-13 RX ORDER — ACETAMINOPHEN 325 MG/1
650 TABLET ORAL EVERY 4 HOURS PRN
Status: DISCONTINUED | OUTPATIENT
Start: 2020-09-13 | End: 2020-09-13 | Stop reason: HOSPADM

## 2020-09-13 RX ORDER — ONDANSETRON 2 MG/ML
4 INJECTION INTRAMUSCULAR; INTRAVENOUS EVERY 6 HOURS PRN
Status: DISCONTINUED | OUTPATIENT
Start: 2020-09-13 | End: 2020-09-13 | Stop reason: HOSPADM

## 2020-09-13 RX ADMIN — ACETAMINOPHEN 650 MG: 325 TABLET ORAL at 10:56

## 2020-09-13 ASSESSMENT — PAIN SCALES - GENERAL: PAINLEVEL_OUTOF10: 9

## 2020-09-13 NOTE — PROGRESS NOTES
OBGYN Interval Note    Patient seen and examined after 2.5 hours. Continues to contract regularly, reports contractions are painful. SVE with slight change to 2-3/70/-2, still posterior. Offered patient to go home and return when contractions become closer together and more intense or to stay for another recheck in 2 hours. Patient opting to stay. Will plan on recheck around 1530.      EFM: Baseline 145 moderate variability w/ accelerations, no decelerations, contractions q2-5 minutes     Vitals:    09/13/20 1012   BP: 121/79   Pulse: 104   Resp: 18   Temp: 98.2 °F (36.8 °C)   TempSrc: Oral   Weight: 174 lb (78.9 kg)   Height: 5' 3\" (1.6 m)         Sowmya Musa CNM updated and in agreement w/ plan     Julian Daley DO  OB/GYN Resident, PGY2  [address] [facility]\""  9/13/2020 1:27 PM

## 2020-09-13 NOTE — FLOWSHEET NOTE
Pt to LD for c/o srom at 0900 today and contractions since 0600. Pt denies bleeding. Positive fetal movement.

## 2020-09-13 NOTE — H&P
OBSTETRICAL HISTORY [facility]    Date: 2020       Time: 10:47 AM   Patient Name: Ricardo Stevens     Patient : 1990  Room/Bed: [facility]/3576-63    Admission Date/Time: 2020  9:56 AM      CC: Leaking fluid , contractions    HPI: Ricardo Stevens is a 30 y.o.  at 38w3d who presents to labor and delivery complaining of leaking of fluid that she noticed around 9 am. She also has been deepa since early this morning and states they are getting more intense. Of note, she is pumping to stimulate early breast milk production. Patient denies any headache, visual changes, difficulty breathing, RUQ pain, N/V, F/C, and pain/swelling in lower extremities. The patient reports fetal movement is present, denies contractions, complains of loss of fluid, denies vaginal bleeding. 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 DATING:  LMP: Patient's last menstrual period was 2019.   Estimated Date of Delivery: 20   Based on: early ultrasound, at 6 0/7 weeks GA    PREGNANCY RISK FACTORS:  Patient Active Problem List   Diagnosis    Herpes genitalis in women    History of depression    Anxiety    Normal repeat pregnancy, antepartum    UTI (urinary tract infection) during pregnancy, first trimester    Eleavted 1 hour glucola, 3 hour WNL    RECEIVED 20 TDAP        Steroids Given In This Pregnancy:  no     REVIEW OF SYSTEMS:   Constitutional: negative fever, negative chills, negative weight changes   HEENT: negative visual disturbances, negative headaches, negative dizziness, negative hearing loss  Breast: Negative breast abnormalities, negative breast lumps, negative nipple discharge  Respiratory: negative dyspnea, negative cough, negative SOB  Cardiovascular: negative chest pain,  negative palpitations, negative arrhythmia, negative syncope Gastrointestinal: negative abdominal pain, negative RUQ pain, negative N/V, negative diarrhea, negative constipation, negative bowel changes, negative heartburn   Genitourinary: negative dysuria, negative hematuria, negative urinary incontinence, negative vaginal discharge, negative vaginal bleeding or spotting  Dermatological: negative rash, negative pruritis, negative mole or other skin changes  Hematologic: negative bruising  Immunologic/Lymphatic: negative recent illness, negative recent sick contact  Musculoskeletal: negative back pain, negative myalgias, negative arthralgias  Neurological:  negative dizziness, negative migraines, negative seizures, negative weakness  Behavior/Psych: negative depression, negative anxiety, negative SI, negative HI    OBSTETRICAL HISTORY:   OB History    Para Term  AB Living   3 1 1 0 1 1   SAB TAB Ectopic Molar Multiple Live Births   1 0 0 0 0 1      # Outcome Date GA Lbr Jaylen/2nd Weight Sex Delivery Anes PTL Lv   3 Current            2 Term 18 38w0d  6 lb 13 oz (3.09 kg) M Vag-Spont EPI N GLADYS      Birth Comments: Nitrous & Epidural      Name: Norris Johnson: 9  Apgar5: 9   1 SAB 2017               PAST MEDICAL HISTORY:   has a past medical history of Anxiety, Depression, H/O borderline personality disorder, Head trauma in child, Herpes simplex virus (HSV) infection, PTSD (post-traumatic stress disorder), Social anxiety disorder, and Trauma. PAST SURGICAL HISTORY:   has a past surgical history that includes Colposcopy and [place] tooth extraction. ALLERGIES:  is allergic to other. MEDICATIONS:  Prior to Admission medications    Medication Sig Start Date End Date Taking?  Authorizing Provider   valACYclovir (VALTREX) 500 MG tablet Take 1 tablet by mouth 2 times daily 20  Yes DENNY Blackwell CNM   acetaminophen (TYLENOL) 500 MG tablet Take 500 mg by mouth every 6 hours as needed for Pain   Yes Historical Provider, MD bilaterally, no crackles or wheezing  Heart:  regular rate and rhythm and no murmur, rubs, gallops  Abdomen:  soft, gravid, non-tender, no rebound, guarding, or rigidity, no RUQ or epigastric tenderness, no signs or symptoms of abruption, no signs or symptoms of chorioamnionitis  Extremities:  no calf tenderness, non edematous, no varicosities, full range of motion in all four extremities  Musculoskeletal: Gross strength equal and intact throughout, no gross abnormalities, range of motion normal in hips, knees, shoulders and spine  Psychiatric: Mood appropriate, normal affect   Rectal Exam: not indicated  Sterile Speculum Exam:   Urethral meatus: normal appearing   Vulva: Normal hair distribution, normal appearing vulva, no masses, tenderness or lesions, normal clitoris   Vagina: Normal appearing vaginal mucosa without lesions, physiologic appearing vaginal discharge noted in the posterior vault, no lacerations, negative pooling negative valsalva, no herpetic lesions   Cervix: Normal appearing cervix without lesions, external os visibly closed, no lacerations or abnormal lesions visualized  Sterile Vaginal Exam:  Cervix: No cervical motion tenderness   Uterus: Is gravid, Normal size, shape, consistency and non-tender   Adnexa: Non-tender, no palpable masses  Cervix: 2 cm dilated, 50 % effaced, -2 station, posterior position (out of 3 station), medium consistency, FETAL POSITION: Cephalic, Membranes intact,        DATA:  Membranes Ruptured: No  Fern: negative  Nitrazine: Negative  Valsalva/Pooling: absent  Vaginal Bleeding: absent        PRENATAL LAB RESULTS:   Blood Type/Rh: A pos  Antibody Screen: negative  Hemoglobin, Hematocrit, Platelets: 54.4 / 04.1 / 280  Rubella: immune  T.  Pallidum, IgG: non-reactive   Hepatitis B Surface Antigen: non-reactive   HIV: non-reactive   Sickle Cell Screen: not done  Gonorrhea: negative  Chlamydia: negative  Urine culture: positive 2/28/20    1 hour Glucose Tolerance Test: 147  3 expectations were discussed in detail. All questions were answered.     Attending's Name: Dr. Dinora Landaverde, DO  Ob/Gyn Resident  9/13/2020, 10:47 AM

## 2020-09-13 NOTE — PROGRESS NOTES
Joyce Earl is a  @ 38w3d who presents for MAGGY visit. She is a midwife patient. She denies LOF, VB.  + FM. The patient was seen yesterday in L&D for some contractions and question of SROM. Pt was found to not be ruptured or in labor and was discharged home. She is wanting to be induced by the midwives at 39 weeks, and she has an appt to be seen by them tomorrow. She denies any fevers/chills, SOB, cough, sore throat, HA, or sick contacts. O:  Vitals:    20 0913   BP: 116/72   Pulse: 98     Gen: NAD  Abd: soft, nontender, gravid  Ext:  mild edema    FHT: 145  FH: 37 cm    A/P:  Patient Active Problem List    Diagnosis Date Noted    RECEIVED 20 TDAP 2020     Priority: High    38 weeks gestation of pregnancy 2020    Eleavted 1 hour glucola, 3 hour WNL 2020     1 hour elevated: 147  3 hour WNL: 78/103/99/109      UTI (urinary tract infection) during pregnancy, first trimester 2020: UTI e-coli, Keflex sent  TRUNG 3/6/20 negative       Normal repeat pregnancy, antepartum 2020     · First trimester screen: WNL  · MSAFP: not done  · Carrier Screening: negative  · MFM anatomy f/up as clinically indicated  · ECHO WNL family hx of cardiac defect    Chart sent to Dr. Feli Bass 2020       Anxiety     Herpes genitalis in women 2018     · suppressive therapy at 39 weeks      History of depression 2018     · In therapy, does not desires medications  · EPDS 14/ _ / 7_       Pt has a complaint of pelvic pain, and would like to have a diagnostic laparoscopy postpartum. She is requesting a risk reducing salpingectomy at the time of surgery as well.   Discussed at length the risks and benefits of concurrent bilateral salpingectomy with patient's upcoming schedule abdominal or pelvic surgery per recommendation of the Society of Gynecologic Oncology, American College of Obstetricians and Gynecologists as this patient is at above average risk for development of ovarian cancer. Advised patient that the primary benefit of such surgery is a 65% reduction in future risk of ovarian cancer. Patient advised that large scale studies have not demonstrated an increase in estimated blood loss, complications, or operating time but that bleeding, infection, and injury to nearby organs are potential complications with this additional surgery. Finally, patient has been thoroughly counseled regarding the consequence of loss of fertility following this procedure. Patient understands that this loss of fertility can not be reversed and has expressed via verbal and written consent that her wishes are to proceed with the this surgery for the purposes of ovarian cancer reduction.   Will schedule for diagnostic laparoscopy with risk reducing salpingectomy    Discussed s/sx that should prompt call to the office  Discussed ashly nassar  RTIQRA in 1 wk with the midwives    Angel Martin MD

## 2020-09-13 NOTE — PROGRESS NOTES
OBGYN Interval Note    Patient seen and examined after 2 hours. Cervix remains unchanged, 2-3/70/-2, still posterior. Patient desires to go home and wait for spontaneous labor. She will come back when contractions get closer together and more intense. Return precautions given. All questions answered and concerns addressed, patient vocalized understanding.      Vitals:    09/13/20 1012 09/13/20 1451   BP: 121/79 124/75   Pulse: 104 101   Resp: 18 18   Temp: 98.2 °F (36.8 °C) 97.5 °F (36.4 °C)   TempSrc: Oral Oral   Weight: 174 lb (78.9 kg)    Height: 5' 3\" (1.6 m)      EFM Baseline 145 bpm moderate variability, w/ accelerations, no decelerations    TOCO: Irregular, q2-6 minutes      Lydia Carrera CNM updated and in agreement     Rupert Guo DO  OB/GYN Resident, PGY2  [address] \[facility]\""  9/13/2020 3:43 PM

## 2020-09-14 ENCOUNTER — ROUTINE PRENATAL (OUTPATIENT)
Dept: OBGYN CLINIC | Age: 30
End: 2020-09-14
Payer: COMMERCIAL

## 2020-09-14 VITALS
BODY MASS INDEX: 31.04 KG/M2 | SYSTOLIC BLOOD PRESSURE: 116 MMHG | WEIGHT: 175.25 LBS | HEART RATE: 98 BPM | DIASTOLIC BLOOD PRESSURE: 72 MMHG

## 2020-09-14 LAB
CULTURE: NORMAL
Lab: NORMAL
SPECIMEN DESCRIPTION: NORMAL

## 2020-09-14 PROCEDURE — G8419 CALC BMI OUT NRM PARAM NOF/U: HCPCS | Performed by: OBSTETRICS & GYNECOLOGY

## 2020-09-14 PROCEDURE — 1036F TOBACCO NON-USER: CPT | Performed by: OBSTETRICS & GYNECOLOGY

## 2020-09-14 PROCEDURE — G8427 DOCREV CUR MEDS BY ELIG CLIN: HCPCS | Performed by: OBSTETRICS & GYNECOLOGY

## 2020-09-14 PROCEDURE — 99213 OFFICE O/P EST LOW 20 MIN: CPT | Performed by: OBSTETRICS & GYNECOLOGY

## 2020-09-15 ENCOUNTER — ROUTINE PRENATAL (OUTPATIENT)
Dept: OBGYN CLINIC | Age: 30
End: 2020-09-15
Payer: COMMERCIAL

## 2020-09-15 VITALS
WEIGHT: 176.2 LBS | BODY MASS INDEX: 31.21 KG/M2 | SYSTOLIC BLOOD PRESSURE: 128 MMHG | DIASTOLIC BLOOD PRESSURE: 83 MMHG | HEART RATE: 105 BPM

## 2020-09-15 PROBLEM — Z3A.38 38 WEEKS GESTATION OF PREGNANCY: Status: RESOLVED | Noted: 2020-09-13 | Resolved: 2020-09-15

## 2020-09-15 PROCEDURE — 1036F TOBACCO NON-USER: CPT | Performed by: ADVANCED PRACTICE MIDWIFE

## 2020-09-15 PROCEDURE — G8427 DOCREV CUR MEDS BY ELIG CLIN: HCPCS | Performed by: ADVANCED PRACTICE MIDWIFE

## 2020-09-15 PROCEDURE — 99213 OFFICE O/P EST LOW 20 MIN: CPT | Performed by: ADVANCED PRACTICE MIDWIFE

## 2020-09-15 PROCEDURE — G8419 CALC BMI OUT NRM PARAM NOF/U: HCPCS | Performed by: ADVANCED PRACTICE MIDWIFE

## 2020-09-15 NOTE — PROGRESS NOTES
SUBJECTIVE:    [facility] is here for her return OB visit. Desires an elective induction of labor at 39 weeks due to mother being in town. She reports  feeling fetal movement. She denies vaginal bleeding. She denies vaginal discharge. She denies leaking of fluid. She denies uterine cramping. She denies  nausea and/or vomiting. OBJECTIVE:  Blood pressure 128/83, pulse 105, weight 176 lb 3.2 oz (79.9 kg), last menstrual period 12/19/2019, currently breastfeeding. Total weight gain: 35 lb 3.2 oz (16 kg)                                 0             1          2         3         Patient  Dilation            Closed      1-2      3-4       5-6         2  Effacement       0-30       40-50   60-70    >80        2  Station             -3              -2       -1/0      +1/+2      2  Consistency     Firm        Med     Soft                    1  Position            Post        Mid       Ant                    0  TOTAL                                                                    7      [facility] has received the Tdap vaccine as appropriate    ASSESSMENT/PLAN:  IUP @ 38+5 weeks  S =D    1. Normal repeat pregnancy, antepartum    2. 38 weeks gestation of pregnancy  - GBS negative  - Reviewed and reinforced fetal kick counts  - Reviewed signs and symptoms of labor, preeclampsia, and alberta hick contractions  - Elective induction of labor scheduled for 39w0d 9/17/20 at 0600. Confirmed with Nicole Slade. Inocencio Peterson notified and is on-call midwife     3. Herpes genitalis in women  - On suppressive therapy denies any current outbreaks. She was counseled regarding all of the above:    Patient was seen with total face to face time of 15 minutes. More than 50% of this visit was education and counseling.     Electronically Signed By: Adrienne Blackwell

## 2020-09-17 ENCOUNTER — TELEPHONE (OUTPATIENT)
Dept: OBGYN CLINIC | Age: 30
End: 2020-09-17

## 2020-09-17 ENCOUNTER — HOSPITAL ENCOUNTER (INPATIENT)
Age: 30
LOS: 1 days | Discharge: HOME | DRG: 560 | End: 2020-09-18
Attending: OBSTETRICS & GYNECOLOGY | Admitting: OBSTETRICS & GYNECOLOGY
Payer: COMMERCIAL

## 2020-09-17 ENCOUNTER — ANESTHESIA EVENT (OUTPATIENT)
Dept: LABOR AND DELIVERY | Age: 30
DRG: 560 | End: 2020-09-17
Payer: COMMERCIAL

## 2020-09-17 ENCOUNTER — ANESTHESIA (OUTPATIENT)
Dept: LABOR AND DELIVERY | Age: 30
DRG: 560 | End: 2020-09-17
Payer: COMMERCIAL

## 2020-09-17 PROBLEM — Z82.79 FAMILY HISTORY OF CONGENITAL HEART DEFECT: Status: ACTIVE | Noted: 2020-09-17

## 2020-09-17 PROBLEM — Z3A.39 39 WEEKS GESTATION OF PREGNANCY: Status: ACTIVE | Noted: 2020-09-17

## 2020-09-17 LAB
ABO/RH: NORMAL
ABSOLUTE EOS #: 0.28 K/UL (ref 0–0.44)
ABSOLUTE IMMATURE GRANULOCYTE: 0.06 K/UL (ref 0–0.3)
ABSOLUTE LYMPH #: 2.04 K/UL (ref 1.1–3.7)
ABSOLUTE MONO #: 0.79 K/UL (ref 0.1–1.2)
AMPHETAMINE SCREEN URINE: NEGATIVE
ANTIBODY SCREEN: NEGATIVE
ARM BAND NUMBER: NORMAL
BARBITURATE SCREEN URINE: NEGATIVE
BASOPHILS # BLD: 0 % (ref 0–2)
BASOPHILS ABSOLUTE: 0.04 K/UL (ref 0–0.2)
BENZODIAZEPINE SCREEN, URINE: NEGATIVE
BUPRENORPHINE URINE: NORMAL
CANNABINOID SCREEN URINE: NEGATIVE
COCAINE METABOLITE, URINE: NEGATIVE
DIFFERENTIAL TYPE: ABNORMAL
EOSINOPHILS RELATIVE PERCENT: 3 % (ref 1–4)
EXPIRATION DATE: NORMAL
HCT VFR BLD CALC: 33.7 % (ref 36.3–47.1)
HEMOGLOBIN: 10.4 G/DL (ref 11.9–15.1)
IMMATURE GRANULOCYTES: 1 %
LYMPHOCYTES # BLD: 21 % (ref 24–43)
MCH RBC QN AUTO: 24.2 PG (ref 25.2–33.5)
MCHC RBC AUTO-ENTMCNC: 30.9 G/DL (ref 28.4–34.8)
MCV RBC AUTO: 78.6 FL (ref 82.6–102.9)
MDMA URINE: NORMAL
METHADONE SCREEN, URINE: NEGATIVE
METHAMPHETAMINE, URINE: NORMAL
MONOCYTES # BLD: 8 % (ref 3–12)
NRBC AUTOMATED: 0 PER 100 WBC
OPIATES, URINE: NEGATIVE
OXYCODONE SCREEN URINE: NEGATIVE
PDW BLD-RTO: 16.5 % (ref 11.8–14.4)
PHENCYCLIDINE, URINE: NEGATIVE
PLATELET # BLD: 298 K/UL (ref 138–453)
PLATELET ESTIMATE: ABNORMAL
PMV BLD AUTO: 12.5 FL (ref 8.1–13.5)
PROPOXYPHENE, URINE: NORMAL
RBC # BLD: 4.29 M/UL (ref 3.95–5.11)
RBC # BLD: ABNORMAL 10*6/UL
SARS-COV-2, RAPID: NOT DETECTED
SARS-COV-2: NORMAL
SARS-COV-2: NORMAL
SEG NEUTROPHILS: 67 % (ref 36–65)
SEGMENTED NEUTROPHILS ABSOLUTE COUNT: 6.48 K/UL (ref 1.5–8.1)
SOURCE: NORMAL
T. PALLIDUM, IGG: NONREACTIVE
TEST INFORMATION: NORMAL
TRICYCLIC ANTIDEPRESSANTS, UR: NORMAL
WBC # BLD: 9.7 K/UL (ref 3.5–11.3)
WBC # BLD: ABNORMAL 10*3/UL

## 2020-09-17 PROCEDURE — 3700000025 EPIDURAL BLOCK: Performed by: ANESTHESIOLOGY

## 2020-09-17 PROCEDURE — 6360000002 HC RX W HCPCS: Performed by: NURSE ANESTHETIST, CERTIFIED REGISTERED

## 2020-09-17 PROCEDURE — 51701 INSERT BLADDER CATHETER: CPT

## 2020-09-17 PROCEDURE — 6360000002 HC RX W HCPCS: Performed by: ANESTHESIOLOGY

## 2020-09-17 PROCEDURE — 80307 DRUG TEST PRSMV CHEM ANLYZR: CPT

## 2020-09-17 PROCEDURE — 6370000000 HC RX 637 (ALT 250 FOR IP): Performed by: STUDENT IN AN ORGANIZED HEALTH CARE EDUCATION/TRAINING PROGRAM

## 2020-09-17 PROCEDURE — 86901 BLOOD TYPING SEROLOGIC RH(D): CPT

## 2020-09-17 PROCEDURE — 2500000003 HC RX 250 WO HCPCS: Performed by: NURSE ANESTHETIST, CERTIFIED REGISTERED

## 2020-09-17 PROCEDURE — 59050 FETAL MONITOR W/REPORT: CPT

## 2020-09-17 PROCEDURE — 86900 BLOOD TYPING SEROLOGIC ABO: CPT

## 2020-09-17 PROCEDURE — 59409 OBSTETRICAL CARE: CPT | Performed by: ADVANCED PRACTICE MIDWIFE

## 2020-09-17 PROCEDURE — 2580000003 HC RX 258: Performed by: STUDENT IN AN ORGANIZED HEALTH CARE EDUCATION/TRAINING PROGRAM

## 2020-09-17 PROCEDURE — 6360000002 HC RX W HCPCS: Performed by: STUDENT IN AN ORGANIZED HEALTH CARE EDUCATION/TRAINING PROGRAM

## 2020-09-17 PROCEDURE — 7200000001 HC VAGINAL DELIVERY

## 2020-09-17 PROCEDURE — 86780 TREPONEMA PALLIDUM: CPT

## 2020-09-17 PROCEDURE — 3E033VJ INTRODUCTION OF OTHER HORMONE INTO PERIPHERAL VEIN, PERCUTANEOUS APPROACH: ICD-10-PCS | Performed by: OBSTETRICS & GYNECOLOGY

## 2020-09-17 PROCEDURE — 10H07YZ INSERTION OF OTHER DEVICE INTO PRODUCTS OF CONCEPTION, VIA NATURAL OR ARTIFICIAL OPENING: ICD-10-PCS | Performed by: OBSTETRICS & GYNECOLOGY

## 2020-09-17 PROCEDURE — 85025 COMPLETE CBC W/AUTO DIFF WBC: CPT

## 2020-09-17 PROCEDURE — 10907ZC DRAINAGE OF AMNIOTIC FLUID, THERAPEUTIC FROM PRODUCTS OF CONCEPTION, VIA NATURAL OR ARTIFICIAL OPENING: ICD-10-PCS | Performed by: OBSTETRICS & GYNECOLOGY

## 2020-09-17 PROCEDURE — 1220000000 HC SEMI PRIVATE OB R&B

## 2020-09-17 PROCEDURE — U0002 COVID-19 LAB TEST NON-CDC: HCPCS

## 2020-09-17 PROCEDURE — 86850 RBC ANTIBODY SCREEN: CPT

## 2020-09-17 RX ORDER — NALBUPHINE HCL 10 MG/ML
5 AMPUL (ML) INJECTION EVERY 4 HOURS PRN
Status: DISCONTINUED | OUTPATIENT
Start: 2020-09-17 | End: 2020-09-17 | Stop reason: HOSPADM

## 2020-09-17 RX ORDER — ROPIVACAINE HYDROCHLORIDE 2 MG/ML
INJECTION, SOLUTION EPIDURAL; INFILTRATION; PERINEURAL
Status: COMPLETED
Start: 2020-09-17 | End: 2020-09-17

## 2020-09-17 RX ORDER — SODIUM CHLORIDE 0.9 % (FLUSH) 0.9 %
10 SYRINGE (ML) INJECTION EVERY 12 HOURS SCHEDULED
Status: DISCONTINUED | OUTPATIENT
Start: 2020-09-17 | End: 2020-09-17 | Stop reason: HOSPADM

## 2020-09-17 RX ORDER — IBUPROFEN 800 MG/1
800 TABLET ORAL EVERY 8 HOURS PRN
Status: DISCONTINUED | OUTPATIENT
Start: 2020-09-17 | End: 2020-09-18 | Stop reason: HOSPADM

## 2020-09-17 RX ORDER — ROPIVACAINE HYDROCHLORIDE 2 MG/ML
INJECTION, SOLUTION EPIDURAL; INFILTRATION; PERINEURAL CONTINUOUS PRN
Status: DISCONTINUED | OUTPATIENT
Start: 2020-09-17 | End: 2020-09-17 | Stop reason: SDUPTHER

## 2020-09-17 RX ORDER — BISACODYL 10 MG
10 SUPPOSITORY, RECTAL RECTAL DAILY PRN
Status: DISCONTINUED | OUTPATIENT
Start: 2020-09-17 | End: 2020-09-18 | Stop reason: HOSPADM

## 2020-09-17 RX ORDER — LIDOCAINE HYDROCHLORIDE 10 MG/ML
30 INJECTION, SOLUTION EPIDURAL; INFILTRATION; INTRACAUDAL; PERINEURAL PRN
Status: DISCONTINUED | OUTPATIENT
Start: 2020-09-17 | End: 2020-09-17 | Stop reason: HOSPADM

## 2020-09-17 RX ORDER — LANOLIN 72 %
OINTMENT (GRAM) TOPICAL PRN
Status: DISCONTINUED | OUTPATIENT
Start: 2020-09-17 | End: 2020-09-18 | Stop reason: HOSPADM

## 2020-09-17 RX ORDER — SODIUM CHLORIDE 0.9 % (FLUSH) 0.9 %
10 SYRINGE (ML) INJECTION PRN
Status: DISCONTINUED | OUTPATIENT
Start: 2020-09-17 | End: 2020-09-17 | Stop reason: HOSPADM

## 2020-09-17 RX ORDER — DIPHENHYDRAMINE HCL 25 MG
25 TABLET ORAL EVERY 4 HOURS PRN
Status: DISCONTINUED | OUTPATIENT
Start: 2020-09-17 | End: 2020-09-17 | Stop reason: HOSPADM

## 2020-09-17 RX ORDER — LIDOCAINE HYDROCHLORIDE AND EPINEPHRINE 15; 5 MG/ML; UG/ML
INJECTION, SOLUTION EPIDURAL PRN
Status: DISCONTINUED | OUTPATIENT
Start: 2020-09-17 | End: 2020-09-17 | Stop reason: SDUPTHER

## 2020-09-17 RX ORDER — ACETAMINOPHEN 500 MG
1000 TABLET ORAL EVERY 6 HOURS PRN
Status: DISCONTINUED | OUTPATIENT
Start: 2020-09-17 | End: 2020-09-18 | Stop reason: HOSPADM

## 2020-09-17 RX ORDER — NALOXONE HYDROCHLORIDE 0.4 MG/ML
0.4 INJECTION, SOLUTION INTRAMUSCULAR; INTRAVENOUS; SUBCUTANEOUS PRN
Status: DISCONTINUED | OUTPATIENT
Start: 2020-09-17 | End: 2020-09-17 | Stop reason: HOSPADM

## 2020-09-17 RX ORDER — ACYCLOVIR 200 MG/1
400 CAPSULE ORAL 3 TIMES DAILY
Status: DISCONTINUED | OUTPATIENT
Start: 2020-09-17 | End: 2020-09-18 | Stop reason: HOSPADM

## 2020-09-17 RX ORDER — HYDROCORTISONE 25 MG/G
CREAM TOPICAL
Status: DISCONTINUED | OUTPATIENT
Start: 2020-09-17 | End: 2020-09-18 | Stop reason: HOSPADM

## 2020-09-17 RX ORDER — ONDANSETRON 2 MG/ML
4 INJECTION INTRAMUSCULAR; INTRAVENOUS EVERY 4 HOURS PRN
Status: DISCONTINUED | OUTPATIENT
Start: 2020-09-17 | End: 2020-09-18 | Stop reason: HOSPADM

## 2020-09-17 RX ORDER — ONDANSETRON 2 MG/ML
4 INJECTION INTRAMUSCULAR; INTRAVENOUS EVERY 6 HOURS PRN
Status: DISCONTINUED | OUTPATIENT
Start: 2020-09-17 | End: 2020-09-17 | Stop reason: HOSPADM

## 2020-09-17 RX ORDER — ACETAMINOPHEN 500 MG
1000 TABLET ORAL EVERY 6 HOURS PRN
Status: DISCONTINUED | OUTPATIENT
Start: 2020-09-17 | End: 2020-09-17 | Stop reason: HOSPADM

## 2020-09-17 RX ORDER — DOCUSATE SODIUM 100 MG/1
100 CAPSULE, LIQUID FILLED ORAL 2 TIMES DAILY
Status: DISCONTINUED | OUTPATIENT
Start: 2020-09-17 | End: 2020-09-18 | Stop reason: HOSPADM

## 2020-09-17 RX ORDER — IBUPROFEN 600 MG/1
600 TABLET ORAL EVERY 6 HOURS PRN
Qty: 40 TABLET | Refills: 0 | Status: SHIPPED | OUTPATIENT
Start: 2020-09-17 | End: 2021-01-25 | Stop reason: ALTCHOICE

## 2020-09-17 RX ORDER — SODIUM CHLORIDE, SODIUM LACTATE, POTASSIUM CHLORIDE, CALCIUM CHLORIDE 600; 310; 30; 20 MG/100ML; MG/100ML; MG/100ML; MG/100ML
INJECTION, SOLUTION INTRAVENOUS CONTINUOUS
Status: DISCONTINUED | OUTPATIENT
Start: 2020-09-17 | End: 2020-09-18 | Stop reason: HOSPADM

## 2020-09-17 RX ORDER — DOCUSATE SODIUM 100 MG/1
100 CAPSULE, LIQUID FILLED ORAL 2 TIMES DAILY PRN
Qty: 60 CAPSULE | Refills: 0 | Status: SHIPPED | OUTPATIENT
Start: 2020-09-17 | End: 2021-01-25 | Stop reason: ALTCHOICE

## 2020-09-17 RX ORDER — LIDOCAINE HYDROCHLORIDE 10 MG/ML
INJECTION, SOLUTION EPIDURAL; INFILTRATION; INTRACAUDAL; PERINEURAL PRN
Status: DISCONTINUED | OUTPATIENT
Start: 2020-09-17 | End: 2020-09-17 | Stop reason: SDUPTHER

## 2020-09-17 RX ORDER — ACETAMINOPHEN 325 MG/1
650 TABLET ORAL EVERY 4 HOURS PRN
Status: DISCONTINUED | OUTPATIENT
Start: 2020-09-17 | End: 2020-09-17 | Stop reason: SDUPTHER

## 2020-09-17 RX ORDER — SIMETHICONE 80 MG
80 TABLET,CHEWABLE ORAL EVERY 6 HOURS PRN
Status: DISCONTINUED | OUTPATIENT
Start: 2020-09-17 | End: 2020-09-18 | Stop reason: HOSPADM

## 2020-09-17 RX ADMIN — ACYCLOVIR 400 MG: 200 CAPSULE ORAL at 09:17

## 2020-09-17 RX ADMIN — LIDOCAINE HYDROCHLORIDE 2 ML: 10 INJECTION, SOLUTION EPIDURAL; INFILTRATION; INTRACAUDAL; PERINEURAL at 11:37

## 2020-09-17 RX ADMIN — ACYCLOVIR 400 MG: 200 CAPSULE ORAL at 22:02

## 2020-09-17 RX ADMIN — ROPIVACAINE HYDROCHLORIDE 10 ML/HR: 2 INJECTION, SOLUTION EPIDURAL; INFILTRATION at 11:48

## 2020-09-17 RX ADMIN — ACETAMINOPHEN 1000 MG: 500 TABLET ORAL at 22:02

## 2020-09-17 RX ADMIN — DOCUSATE SODIUM 100 MG: 100 CAPSULE, LIQUID FILLED ORAL at 22:02

## 2020-09-17 RX ADMIN — SODIUM CHLORIDE, POTASSIUM CHLORIDE, SODIUM LACTATE AND CALCIUM CHLORIDE: 600; 310; 30; 20 INJECTION, SOLUTION INTRAVENOUS at 07:05

## 2020-09-17 RX ADMIN — LIDOCAINE HYDROCHLORIDE 1 ML: 10 INJECTION, SOLUTION EPIDURAL; INFILTRATION; INTRACAUDAL; PERINEURAL at 11:41

## 2020-09-17 RX ADMIN — Medication 1 MILLI-UNITS/MIN: at 08:00

## 2020-09-17 RX ADMIN — Medication 8 ML: at 11:48

## 2020-09-17 RX ADMIN — SODIUM CHLORIDE, POTASSIUM CHLORIDE, SODIUM LACTATE AND CALCIUM CHLORIDE: 600; 310; 30; 20 INJECTION, SOLUTION INTRAVENOUS at 11:30

## 2020-09-17 RX ADMIN — LIDOCAINE HYDROCHLORIDE AND EPINEPHRINE 5 ML: 15; 5 INJECTION, SOLUTION EPIDURAL at 11:39

## 2020-09-17 RX ADMIN — IBUPROFEN 800 MG: 800 TABLET, FILM COATED ORAL at 16:31

## 2020-09-17 ASSESSMENT — PAIN SCALES - GENERAL: PAINLEVEL_OUTOF10: 4

## 2020-09-17 NOTE — PROGRESS NOTES
Labor Progress Note    Shona Aviles is a 30 y.o. female  at 39w0d  The patient was seen and examined. Her pain is well controlled. She reports fetal movement is present, complains of contractions, complains of loss of fluid, denies vaginal bleeding. IUPC was placed and the patient tolerated the procedure well.     Vital Signs:  Vitals:    20 1701 20 1716 20 1731 20 1746   BP: 126/62 127/82 129/79 131/68   Pulse: 84 101 103 101   Resp: 16 16 16 16   Temp:       TempSrc:             FHT: 135, moderate variability, accelerations present, decelerations absent  Contractions: none on TOCO  Cervical Exam: 3-4 cm dilated, 70 effaced, -2 station      Membranes: Ruptured clear fluid  Scalp Electrode in place: absent  Intrauterine Pressure Catheter in Place: present    Interventions: IUPC Placed    Assessment/Plan:  Shona Aviles is a 30 y.o. female  at 39w0d admitted for eIOL              - GBS negative, No indication for GBS prophylaxis              - VSS, afebrile              - cEFM/TOCO              - IVF LR @ 125 ml/hr   - IUPC placed              - AROM @1245, clr fluid              - Pitocin running, will continue to increase per protocol              - Continue current management    Attending updated and in agreement with plan    Kelsea Salazar DO  Ob/Gyn Resident  2020, 1:52 PM

## 2020-09-17 NOTE — DISCHARGE SUMMARY
Obstetric Discharge Summary  [address] [facility]    Patient Name: Keely Espino  Patient : 1990  Primary Care Physician: DENNY Cortez CNP  Admit Date: 2020    Principal Diagnosis: IUP at 39w0d, admitted for Induction of Labor    Her pregnancy has been complicated by:   Patient Active Problem List   Diagnosis    Herpes genitalis in women    History of depression    Anxiety     20 F APG9,9 Wt7-4       Infection Present?: No  Hospital Acquired: No    Surgical Operations & Procedures:  [x] Pitocin Induction of Labor  [] Pitocin Augmentation of Labor  [] Prostaglandin Induction of Labor  [] Cytotec (Misoprostol)  [] Mechanical Induction of Labor  [x] Artificial Rupture of Membranes  [x] Intrauterine Pressure Catheter  [] Fetal Scalp Electrode  [] Amnioinfusion  [] Antibiotic Prophylaxis    Analgesia: epidural  Delivery Type: Spontaneous Vaginal Delivery: See Labor and Delivery Summary   Laceration(s): Absent    Consultations: Anesthesia    Pertinent Findings & Procedures:   Keely Espino is a 30 y.o. female  at 39w0d admitted for elective induction of labor; received pitocin induction, epidural, AROM, IUPC, and postpartum pitocin.  She delivered by spontaneous vaginal a Live Born      Information for the patient's :  Cleveland Mckennaon [[id number]]   female   Birth Weight: 7 lb 4.2 oz (3.295 kg)       Apgars:   Information for the patient's :  Flori Mcqueen, [facility] [address] [[id number]]          Postpartum course: normal.      Course of patient: uncomplicated    Discharge to: Home    Readmission planned: no     Recommendations on Discharge:     Medications:      Karsten Hackett   Home Medication Instructions [facility]:[id number]    Printed on:20 0906   Medication Information                      acetaminophen (TYLENOL) 500 MG tablet  Take 500 mg by mouth every 6 hours as needed for Pain             calcium carbonate (TUMS E-X 750) 750 MG chewable tablet  Take 2 tablets by mouth daily Indications: \"heartburn\"             docusate sodium (COLACE) 100 MG capsule  Take 1 capsule by mouth 2 times daily as needed for Constipation             ibuprofen (ADVIL;MOTRIN) 600 MG tablet  Take 1 tablet by mouth every 6 hours as needed for Pain             ondansetron (ZOFRAN) 4 MG tablet  Take 1 tablet by mouth every 8 hours as needed for Nausea or Vomiting             phenazopyridine (PYRIDIUM) 100 MG tablet  Take 1 tablet by mouth 3 times daily as needed for Pain             Prenatal Vit-Fe Fumarate-FA (PRENATAL VITAMIN PO)  Take 1 capsule by mouth daily             valACYclovir (VALTREX) 500 MG tablet  Take 1 tablet by mouth 2 times daily                 Activity: Slow return to ADLs  Diet: Regular  Follow up: 2 weeks with [facility]    Condition on discharge: good    Discharge date: 9/18/2020

## 2020-09-17 NOTE — ANESTHESIA PROCEDURE NOTES
Epidural Block    Patient location during procedure: OB  Reason for block: labor epidural  Staffing  Resident/CRNA: DENNY Cano CRNA  Performed: resident/CRNA   Preanesthetic Checklist  Completed: patient identified, site marked, surgical consent, pre-op evaluation, timeout performed, IV checked, risks and benefits discussed, monitors and equipment checked, anesthesia consent given, oxygen available and patient being monitored  Epidural  Patient position: sitting  Prep: Betadine  Patient monitoring: continuous pulse ox and frequent blood pressure checks  Approach: midline  Location: lumbar (1-5)  Provider prep: mask and sterile gloves  Needle  Needle gauge: 17 G  Needle length: 3.5 in  Needle insertion depth: 6 cm  Catheter type: end hole  Catheter size: 19 G  Catheter at skin depth: 12 cm  Test dose: negative  Assessment  Hemodynamics: stable  Attempts: 1

## 2020-09-17 NOTE — PROGRESS NOTES
[facility] Labor Note    SUBJECTIVE:    Manoj is comfortable with contractions but feels they are getting stronger. Family remains at bedside    OBJECTIVE:     VS:  oral temperature is 98.5 °F (36.9 °C). Her blood pressure is 116/65 and her pulse is 98. Her respiration is 16. FHT:    Baseline: 125   Variability: moderate              Accels: present   Decels: Absent    Contraction pattern:                                  Frequency: Irregular, 2-4 min aprt                                 Duration:  sec                                 Intensity: Mild                                 Pitocin: 8 units                                 IUPC:  No    Cervix:             Dilation: 3-4 cm            Effacement: 70%            Station: -1            Position: Posterior            Consistency: soft    Status of membranes: Intact    Pain control: None needed at present    Maternal status (hydration, fatigue, voids): Taking PO fluids and solids. Voiding QS. IV infusing. Somewhat anxious but appropriate. Has been OOB.   Requests epidural followed by ROM as \"does not want to be here all day\"     ASSESSMENT/PLAN:  Induction of labor  - Minimal cervical change  - Will place epidural followed by ROM per request, titrate Pitocin as needed after ROM  FHR Category 1
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
## 2020-09-17 NOTE — PROGRESS NOTES
Labor Progress Note    Marquita Harada is a 30 y.o. female  at 39w0d  The patient was seen and examined. Her pain is well controlled. She reports fetal movement is present, complains of contractions, denies loss of fluid, denies vaginal bleeding. AROM performed, clr fluid, patient tolerated the procedure well.     Vital Signs:  Vitals:    20 1156 20 1200 20 1201 20 1231   BP: 127/60  130/63 127/72   Pulse: 108  98 97   Resp: 16  16 16   Temp:  98.3 °F (36.8 °C)     TempSrc:  Oral           FHT: 135, moderate variability, accelerations present, decelerations absent  Contractions: regular, every 3-4 minutes  Cervical Exam: 3-4 cm dilated, 70 effaced, -5 station    Membranes: Ruptured clear fluid  Scalp Electrode in place: absent  Intrauterine Pressure Catheter in Place: absent    Interventions: AROM    Assessment/Plan:  Marquita Harada is a 30 y.o. female  at 39w0d admitted for eIOL   - GBS negative, No indication for GBS prophylaxis   - VSS, afebrile   - cEFM/TOCO   - IVF LR @ 125 ml/hr   - AROM performed, clr fluid   - Pitocin running, will continue to increase per protocol   - Continue current management           Attending updated and in agreement with plan    Porsha Garrison DO  Ob/Gyn Resident  2020, 12:50 PM

## 2020-09-17 NOTE — L&D DELIVERY NOTE
Mother's Information    Labor Events     labor?:  No  Rupture type:  Artificial=AROM  Fluid color:  Clear  Fluid odor:  None     Mother Delivery Information    Episiotomy:  None  Lacerations:  None  Repair Suture:  None  Surgical or Additional Est. Blood Loss (mL):  0 (View Only):  Edit in Flowsheets   Combined Est. Blood Loss (mL):  0        Camelia, Baby Girl [facility] [[id number]]    Labor Events     labor?:  No   steroids?:  None  Cervical ripening date/time:     Antibiotics received during labor?:  No  Rupture date/time:     Rupture type:  Artificial=AROM  Fluid color:  Clear  Fluid odor:  None  Induction:  Oxytocin, AROM  Indications for induction:  Elective  Augmentation:  None  Labor complications:  None          Labor Event Times    Labor onset date/time:     Dilation complete date/time:   20 150   Start pushin2020 1517   Decision time (emergent ):        Anesthesia    Method:  Epidural  Analgesics:  ROPIVACAINE HCL 2 MG/ML IJ SOLN     Assisted Delivery Details    Forceps attempted?:  No  Vacuum extractor attempted?:  No     Document Additional Attempt       Document Additional Attempt             Shoulder Dystocia    Shoulder dystocia present?:  No  Add Second Maneuver  Add Third Maneuver  Add Fourth Maneuver  Add Fifth Maneuver  Add Sixth Maneuver  Add Seventh Maneuver  Add Eighth Maneuver  Add Ninth Maneuver      Presentation    Presentation:  Vertex  Position:  Right  _:  Occiput  _:  Anterior      Information    Head delivery date/time:  2020 15:38:00   Changing the 's delivery date/time could affect patient care.:     Delivery date/time:   20 1538   Delivery type:  Vaginal, Spontaneous    Details:         Delivery Providers    Delivering clinician:  DENNY Najera CNM   Provider Role    Theodoro Seats, DO Resident    Julio Hernandez RN Registered Nurse      Cord    Vessels:  3 Vessels  Complications: None  Delayed cord clamping?:  Yes  Cord clamped date/time:  2020 1539  Cord blood disposition:  Lab  Gases sent?:  Yes  Stem cell collection (by provider): No     Placenta    Date/time:  2020 15:39:00  Removal:  Spontaneous  Appearance:  Intact  Disposition:  Lab, Pathology     Delivery Resuscitation    Method:  Bulb Suction, Stimulation     Apgars    Living status:  Living  Apgars   1 Minute:   5 Minute:   10 Minute 15 Minute 20 Minute   Skin Color: 1  1       Heart Rate: 2  2       Reflex Irritability: 2  2       Muscle Tone: 2  2       Respiratory Effort: 2  2       Total: 9  9               Apgars Assigned By:  Gabriella Amaral     Skin to Skin    Skin to skin initiation date/time: 20 15:39:00   Skin to skin with: Mother  Skin to skin end date/time:     Breastfeeding initiated date/time:  2020 16:08:12      Measurements    ID band #:  [id number]       Delivery Information    Episiotomy:  None  Perineal lacerations:  None    Vaginal laceration:  No    Cervical laceration:  No    Surgical or additional est. blood loss (mL):  0 (View Only):  Edit in Flowsheets   Combined est. blood loss (mL):  0  Repair suture:  None     Other Procedures    Procedures:  [facility] Vaginal Delivery Summary          Information for the patient's :  Kari Hensley [[id number]]          Pre-operative Diagnosis:  39 week induction of labor    Post-operative Diagnosis:  Same, delivered of viable female infant    Procedure:  Spontaneous vaginal delivery    Provider:   Lupe Cordero CNM    Information for the patient's :  Kari Hensley [[id number]]          Anesthesia:  epidural anesthesia    Estimated blood loss:  See QBL    Specimen:  Placenta not sent to pathology     Cord blood sent Yes    Complications:  none    Condition:  mother stable    Details of Procedure:    The patient is a 30 y.o. female at 39w0d   OB History        3    Para   1    Term   1         AB   1    Living   1       SAB   1    TAB        Ectopic        Molar        Multiple   0    Live Births   1             who was admitted for induction of labor    She received the following interventions:   1. Pitocin induction  2. AROM  3. IUPC  4.    5.     Pre-Delivery Note:  Reported pressure while on side. SVE revealed Complete/+1 st.  Physiologic pushing was encouraged. The patient progressed rapidly after AROM, received an epidural, became Complete and started to push with strong effort. She progressed to spontaneous vaginal delivery of viable female  infant, who was delivered atraumatically, shoulders easily delivered and placed on mother's abdomen. The cord clamping was delayed. The cord was clamped and cut and cord blood obtained. The delivery of the placenta was spontaneous,  65 Rena Dunlap presentation. Placental examination revealed a grossly intact placenta with a  3 vessel cord. The uterus was massaged to firm and contracted immediately, with bleeding under control. IV  Pitocin infusion  started. The perineum and vagina were inspected and no lacerations were found. Mother and baby stable.  Baby  to breast.
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
## 2020-09-17 NOTE — ANESTHESIA POSTPROCEDURE EVALUATION
Department of Anesthesiology  Postprocedure Note    Patient: Keely Espino  MRN: [id number]  YOB: 1990  Date of evaluation: 9/17/2020  Time:  5:17 PM     Procedure Summary     Date:  09/17/20 Room / Location:      Anesthesia Start:  1129 Anesthesia Stop:  1538    Procedure:  Labor Analgesia Diagnosis:      Scheduled Providers:   Responsible Provider:  Smiley Minaya MD    Anesthesia Type:  epidural ASA Status:  2          Anesthesia Type: epidural    Felicitas Phase I: Felicitas Score: 9    Felicitas Phase II:      Last vitals: Reviewed and per EMR flowsheets.        Anesthesia Post Evaluation    Patient location during evaluation: floor  Patient participation: complete - patient participated  Level of consciousness: awake  Pain score: 0  Airway patency: patent  Nausea & Vomiting: no vomiting and no nausea  Complications: no  Cardiovascular status: blood pressure returned to baseline  Respiratory status: acceptable  Hydration status: stable
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
## 2020-09-18 VITALS
RESPIRATION RATE: 16 BRPM | HEART RATE: 86 BPM | DIASTOLIC BLOOD PRESSURE: 81 MMHG | SYSTOLIC BLOOD PRESSURE: 126 MMHG | TEMPERATURE: 98 F

## 2020-09-18 PROBLEM — Z82.79 FAMILY HISTORY OF CONGENITAL HEART DEFECT: Status: RESOLVED | Noted: 2020-09-17 | Resolved: 2020-09-18

## 2020-09-18 PROBLEM — O23.41 UTI (URINARY TRACT INFECTION) DURING PREGNANCY, FIRST TRIMESTER: Status: RESOLVED | Noted: 2020-03-02 | Resolved: 2020-09-18

## 2020-09-18 PROBLEM — O99.810 ABNORMAL GLUCOSE TOLERANCE TEST IN PREGNANCY, ANTEPARTUM: Status: RESOLVED | Noted: 2020-06-23 | Resolved: 2020-09-18

## 2020-09-18 PROBLEM — Z3A.39 39 WEEKS GESTATION OF PREGNANCY: Status: RESOLVED | Noted: 2020-09-17 | Resolved: 2020-09-18

## 2020-09-18 PROBLEM — Z34.90 NORMAL REPEAT PREGNANCY, ANTEPARTUM: Status: RESOLVED | Noted: 2020-02-27 | Resolved: 2020-09-18

## 2020-09-18 PROBLEM — Z23 NEED FOR TDAP VACCINATION: Status: RESOLVED | Noted: 2020-08-04 | Resolved: 2020-09-18

## 2020-09-18 PROCEDURE — 6370000000 HC RX 637 (ALT 250 FOR IP): Performed by: STUDENT IN AN ORGANIZED HEALTH CARE EDUCATION/TRAINING PROGRAM

## 2020-09-18 RX ADMIN — DOCUSATE SODIUM 100 MG: 100 CAPSULE, LIQUID FILLED ORAL at 08:11

## 2020-09-18 RX ADMIN — ACYCLOVIR 400 MG: 200 CAPSULE ORAL at 08:11

## 2020-09-18 RX ADMIN — IBUPROFEN 800 MG: 800 TABLET, FILM COATED ORAL at 06:34

## 2020-09-18 ASSESSMENT — PAIN SCALES - GENERAL: PAINLEVEL_OUTOF10: 5

## 2020-09-18 NOTE — PROGRESS NOTES
[facility] Women's Health   Vaginal Postpartum Note  Hospital Day: 2  Postpartum Day: 1      SUBJECTIVE:  Voices no concerns today. Doing well. Voiding, ambulating, eating without difficulty. Denies any leg pain. Bonding with baby. Resting well. Lochia is light. Reports pain/cramping and is 4/10 on the pain scale. She reports it is controlled with oral meds. She denies headache, vision changes, RUQ pain, epigastric pain, swelling. Desires discharge today after baby's 24-hour care. OBJECTIVE:     Vitals:  /66   Pulse 85   Temp 98.1 °F (36.7 °C) (Oral)   Resp 16   LMP 2019     Constitutional:  Awake, alert, cooperative, no apparent distress. Appears to be bonding well with baby, does not appear overly stressed with infant handling. Pain:  4/10 intermittent abdominal cramping, improves with oral meds. Breasts:  Soft without tenderness, nipples are intact. Abdominal Exam: Soft, non-tender, lax muscle tone. Fundus is mid-line, firm @ U/-1. Non-tender with palpation. Bladder non-distended. Perineum: Intact and healing. Anus: Normal in appearance                       Lochia: Small and Rubra    Extremities: Negative Jonny sign. Minimal edema. Voiding QS, no BM yet, passing flatus    Labs:   Lab Results   Component Value Date    HGB 10.4 2020    HCT 33.7 2020       ASSESSMENT/PLAN:     Marquita Harada is a  post partum vaginal birth Day 1  · Continue PP care. Encourage rest, hydration, and ambulation as tolerated. · VSS  · Hemoglobin/hematocrit if symptomatic  · Reviewed birth experience and reports satisfaction  · Discharge instructions were reviewed regarding perineal care, breast care, activity, rest, diet, secondhand smoke and increased SIDS risk, hygiene and PP depression. Questions were answered.   · Discharge planned for today (pending SW clearance)  · RTO in 2 weeks  Breastfeeding (pumping)  · Breast care reviewed  · Denies s/s mastitis  Rh positive/Rubella immune  Hx genital herpes  Anxiety and depression  · Not on meds, reports has good support system and feels well  · SW consult to be completed before discharge  BP elevated without diagnosis of hypertension  · 9/17/20 at 1112: /70, repeat 107/58 (time of epidural request)  · 9/17/20 at 1530: /83, repeat 127/78 (while pushing)  · All blood pressures since have been normal 560O-025T systolic, 92-31S diastolic. [facility] is asymptomatic. Will monitor next few BPs while inpatient but very low suspicion for gHTN.    · Reviewed at length s/s to report to CNMs in postpartum period  · OK for follow up visit in 2 weeks

## 2020-09-18 NOTE — PROGRESS NOTES
POST PARTUM DAY # 1    Keely Espino is a 30 y.o. female  This patient was seen & examined today. Her pregnancy was complicated by:   Patient Active Problem List   Diagnosis    Herpes genitalis in women    History of depression    Anxiety    Normal repeat pregnancy, antepartum    UTI (urinary tract infection) during pregnancy, first trimester    Eleavted 1 hour glucola, 3 hour WNL    RECEIVED 20 TDAP    39 weeks gestation of pregnancy    FHx of CHD     20 F APG9,9 Wt7-4       Today she is doing well without any chief complaint. Her lochia is light. She denies chest pain, shortness of breath, headache and lightheadedness. She is  breast feeding and she denies any breast tenderness. She is ambulating well. Her voiding pattern is normal. I reviewed signs and symptoms of post partum depression with the patient, she currently denies any of these symptoms. She is tolerating solids. Vital Signs:  Vitals:    20 1817 20 1840 20 2000 20 0000   BP: 127/65 118/77 113/71 118/66   Pulse: 103 98 89 85   Resp:    Temp:  98.2 °F (36.8 °C) 98.2 °F (36.8 °C) 98.1 °F (36.7 °C)   TempSrc:  Oral Oral Oral         Physical Exam:  General:  no apparent distress, alert and cooperative  Neurologic:  alert, oriented, normal speech, no focal findings or movement disorder noted  Lungs:  No increased work of breathing, good air exchange, clear to auscultation bilaterally, no crackles or wheezing  Heart:  regular rate and rhythm    Abdomen: abdomen soft, non-distended, non-tender  Fundus: non-tender, normal size, firm, below umbilicus  Extremities:  no calf tenderness, non edematous    Lab:  Lab Results   Component Value Date    HGB 10.4 (L) 2020     Lab Results   Component Value Date    HCT 33.7 (L) 2020       Assessment/Plan:  1.  Keely Espino is a [id number] PPD # 1 s/p    - Doing well, VSS   - female infant in [address]   - Encourage ambulation   -

## 2020-09-18 NOTE — FLOWSHEET NOTE
I have reviewed all [facility] Post-Birth Warning Signs and essential teaching points for pulmonary embolism, cardiac disease, hypertensive disorders of pregnancy, obstetric hemorrhage, venous thromboembolism, infection, and postpartum depression with the patient and FOB. I have informed the patient on when to call their healthcare provider and when to call 911. I have discussed with the patient  the importance of scheduling a follow-up visit with their physician, nurse practitioner or midwife and provided them with correct contact information for appointment. I have provided the patient with a copy of the \"Save Your Life\" handout. The patient has acknowledged receiving and understanding this education with her signature. Follow up appointments and discharge instructions reviewed.

## 2020-09-18 NOTE — PLAN OF CARE
Problem: Anxiety:  Goal: Level of anxiety will decrease  Description: Level of anxiety will decrease  Outcome: Completed     Problem: Breathing Pattern - Ineffective:  Goal: Able to breathe comfortably  Description: Able to breathe comfortably  Outcome: Completed     Problem: Fluid Volume - Imbalance:  Goal: Absence of imbalanced fluid volume signs and symptoms  Description: Absence of imbalanced fluid volume signs and symptoms  Outcome: Completed  Goal: Absence of intrapartum hemorrhage signs and symptoms  Description: Absence of intrapartum hemorrhage signs and symptoms  Outcome: Completed     Problem: Infection - Intrapartum Infection:  Goal: Will show no infection signs and symptoms  Description: Will show no infection signs and symptoms  Outcome: Completed     Problem: Labor Process - Prolonged:  Goal: Labor progression, first stage, within specified pattern  Description: Labor progression, first stage, within specified pattern  Outcome: Completed  Goal: Labor progession, second stage, within specified pattern  Description: Labor progession, second stage, within specified pattern  Outcome: Completed  Goal: Uterine contractions within specified parameters  Description: Uterine contractions within specified parameters  Outcome: Completed     Problem:  Screening:  Goal: Ability to make informed decisions regarding treatment has improved  Description: Ability to make informed decisions regarding treatment has improved  Outcome: Completed     Problem: Pain - Acute:  Goal: Pain level will decrease  Description: Pain level will decrease  Outcome: Completed  Goal: Able to cope with pain  Description: Able to cope with pain  Outcome: Completed     Problem: Tissue Perfusion - Uteroplacental, Altered:  Goal: Absence of abnormal fetal heart rate pattern  Description: Absence of abnormal fetal heart rate pattern  Outcome: Completed     Problem: Urinary Retention:  Goal: Experiences of bladder distention will decrease  Description: Experiences of bladder distention will decrease  Outcome: Completed  Goal: Urinary elimination within specified parameters  Description: Urinary elimination within specified parameters  Outcome: Completed     Problem: Pain:  Goal: Pain level will decrease  Description: Pain level will decrease  Outcome: Completed  Goal: Control of acute pain  Description: Control of acute pain  Outcome: Completed  Goal: Control of chronic pain  Description: Control of chronic pain  Outcome: Completed     Problem: VAGINAL DELIVERY - RECOVERY AND POST PARTUM  Goal: Vital signs are medically acceptable  Outcome: Completed  Goal: Patient will remain free of falls  Outcome: Completed  Goal: Fundus firm at midline  Outcome: Completed  Goal: Moderate rubra without clots, no purulent discharge, no foul smelling lochia  Outcome: Completed  Goal: Empties bladder  Outcome: Completed  Goal: Verbalizes understanding of normal bowel function resumption  Outcome: Completed  Goal: Edema will be absent or minimal  Outcome: Completed  Goal: Breasts are soft with nipple integrity intact  Outcome: Completed  Goal: Demonstrates appropriate breast feeding techniques  Outcome: Completed  Goal: Appropriate behavior observed  Outcome: Completed  Goal: Positive Mother-Baby interactions are observed  Outcome: Completed  Goal: Perineum intact without discharge or hematoma  Outcome: Completed  Goal: Ambulates independently  Outcome: Completed     Problem: PAIN  Goal: Patient's pain/discomfort is manageable  Outcome: Completed     Problem: KNOWLEDGE DEFICIT  Goal: Patient/S.O. demonstrates understanding of disease process, treatment plan, medications, and discharge instructions.   Outcome: Completed

## 2020-09-23 ENCOUNTER — TELEPHONE (OUTPATIENT)
Dept: OBGYN CLINIC | Age: 30
End: 2020-09-23

## 2020-09-24 ENCOUNTER — TELEPHONE (OUTPATIENT)
Dept: OBGYN | Age: 30
End: 2020-09-24

## 2020-09-24 ENCOUNTER — APPOINTMENT (OUTPATIENT)
Dept: GENERAL RADIOLOGY | Age: 30
DRG: 561 | End: 2020-09-24
Payer: COMMERCIAL

## 2020-09-24 ENCOUNTER — HOSPITAL ENCOUNTER (INPATIENT)
Age: 30
LOS: 2 days | Discharge: HOME | DRG: 561 | End: 2020-09-26
Attending: EMERGENCY MEDICINE | Admitting: OBSTETRICS & GYNECOLOGY
Payer: COMMERCIAL

## 2020-09-24 DIAGNOSIS — N71.9 ENDOMETRITIS: ICD-10-CM

## 2020-09-24 DIAGNOSIS — A41.9 SEPTICEMIA (HCC): Primary | ICD-10-CM

## 2020-09-24 LAB
-: ABNORMAL
ABSOLUTE EOS #: 0.13 K/UL (ref 0–0.44)
ABSOLUTE IMMATURE GRANULOCYTE: 0.04 K/UL (ref 0–0.3)
ABSOLUTE LYMPH #: 1.14 K/UL (ref 1.1–3.7)
ABSOLUTE MONO #: 0.65 K/UL (ref 0.1–1.2)
ALBUMIN SERPL-MCNC: 3.6 G/DL (ref 3.5–5.2)
ALBUMIN/GLOBULIN RATIO: 1.1 (ref 1–2.5)
ALP BLD-CCNC: 135 U/L (ref 35–104)
ALT SERPL-CCNC: 20 U/L (ref 5–33)
AMORPHOUS: ABNORMAL
ANION GAP SERPL CALCULATED.3IONS-SCNC: 14 MMOL/L (ref 9–17)
AST SERPL-CCNC: 18 U/L
BACTERIA: ABNORMAL
BASOPHILS # BLD: 0 % (ref 0–2)
BASOPHILS ABSOLUTE: 0.03 K/UL (ref 0–0.2)
BILIRUB SERPL-MCNC: 0.38 MG/DL (ref 0.3–1.2)
BILIRUBIN URINE: NEGATIVE
BNP INTERPRETATION: NORMAL
BUN BLDV-MCNC: 11 MG/DL (ref 6–20)
BUN/CREAT BLD: ABNORMAL (ref 9–20)
CALCIUM SERPL-MCNC: 8.6 MG/DL (ref 8.6–10.4)
CASTS UA: ABNORMAL /LPF (ref 0–8)
CHLORIDE BLD-SCNC: 106 MMOL/L (ref 98–107)
CO2: 21 MMOL/L (ref 20–31)
COLOR: ABNORMAL
CREAT SERPL-MCNC: 0.44 MG/DL (ref 0.5–0.9)
CRYSTALS, UA: ABNORMAL /HPF
DIFFERENTIAL TYPE: ABNORMAL
DIRECT EXAM: NORMAL
EOSINOPHILS RELATIVE PERCENT: 1 % (ref 1–4)
EPITHELIAL CELLS UA: ABNORMAL /HPF (ref 0–5)
GFR AFRICAN AMERICAN: >60 ML/MIN
GFR NON-AFRICAN AMERICAN: >60 ML/MIN
GFR SERPL CREATININE-BSD FRML MDRD: ABNORMAL ML/MIN/{1.73_M2}
GFR SERPL CREATININE-BSD FRML MDRD: ABNORMAL ML/MIN/{1.73_M2}
GLUCOSE BLD-MCNC: 80 MG/DL (ref 70–99)
GLUCOSE URINE: NEGATIVE
HCT VFR BLD CALC: 32.8 % (ref 36.3–47.1)
HEMOGLOBIN: 9.6 G/DL (ref 11.9–15.1)
IMMATURE GRANULOCYTES: 0 %
INR BLD: 0.9
KETONES, URINE: ABNORMAL
LACTIC ACID, WHOLE BLOOD: 1.2 MMOL/L (ref 0.7–2.1)
LACTIC ACID, WHOLE BLOOD: 2.6 MMOL/L (ref 0.7–2.1)
LEUKOCYTE ESTERASE, URINE: ABNORMAL
LYMPHOCYTES # BLD: 11 % (ref 24–43)
Lab: NORMAL
MCH RBC QN AUTO: 23.9 PG (ref 25.2–33.5)
MCHC RBC AUTO-ENTMCNC: 29.3 G/DL (ref 28.4–34.8)
MCV RBC AUTO: 81.8 FL (ref 82.6–102.9)
MONOCYTES # BLD: 6 % (ref 3–12)
MUCUS: ABNORMAL
NITRITE, URINE: NEGATIVE
NRBC AUTOMATED: 0 PER 100 WBC
OTHER OBSERVATIONS UA: ABNORMAL
PARTIAL THROMBOPLASTIN TIME: 25.6 SEC (ref 20.5–30.5)
PDW BLD-RTO: 16.8 % (ref 11.8–14.4)
PH UA: 6.5 (ref 5–8)
PLATELET # BLD: 303 K/UL (ref 138–453)
PLATELET ESTIMATE: ABNORMAL
PMV BLD AUTO: 9.4 FL (ref 8.1–13.5)
POTASSIUM SERPL-SCNC: 3.9 MMOL/L (ref 3.7–5.3)
PRO-BNP: 44 PG/ML
PROTEIN UA: ABNORMAL
PROTHROMBIN TIME: 9.4 SEC (ref 9–12)
RBC # BLD: 4.01 M/UL (ref 3.95–5.11)
RBC # BLD: ABNORMAL 10*6/UL
RBC UA: ABNORMAL /HPF (ref 0–4)
RENAL EPITHELIAL, UA: ABNORMAL /HPF
SARS-COV-2, RAPID: NOT DETECTED
SARS-COV-2: NORMAL
SARS-COV-2: NORMAL
SEG NEUTROPHILS: 82 % (ref 36–65)
SEGMENTED NEUTROPHILS ABSOLUTE COUNT: 8.74 K/UL (ref 1.5–8.1)
SODIUM BLD-SCNC: 141 MMOL/L (ref 135–144)
SOURCE: NORMAL
SPECIFIC GRAVITY UA: 1.02 (ref 1–1.03)
SPECIMEN DESCRIPTION: NORMAL
TOTAL PROTEIN: 6.8 G/DL (ref 6.4–8.3)
TRICHOMONAS: ABNORMAL
TROPONIN INTERP: NORMAL
TROPONIN T: NORMAL NG/ML
TROPONIN, HIGH SENSITIVITY: <6 NG/L (ref 0–14)
TURBIDITY: ABNORMAL
URINE HGB: ABNORMAL
UROBILINOGEN, URINE: NORMAL
WBC # BLD: 10.7 K/UL (ref 3.5–11.3)
WBC # BLD: ABNORMAL 10*3/UL
WBC UA: ABNORMAL /HPF (ref 0–5)
YEAST: ABNORMAL

## 2020-09-24 PROCEDURE — 2500000003 HC RX 250 WO HCPCS: Performed by: EMERGENCY MEDICINE

## 2020-09-24 PROCEDURE — 83880 ASSAY OF NATRIURETIC PEPTIDE: CPT

## 2020-09-24 PROCEDURE — U0002 COVID-19 LAB TEST NON-CDC: HCPCS

## 2020-09-24 PROCEDURE — 85610 PROTHROMBIN TIME: CPT

## 2020-09-24 PROCEDURE — 85730 THROMBOPLASTIN TIME PARTIAL: CPT

## 2020-09-24 PROCEDURE — 87660 TRICHOMONAS VAGIN DIR PROBE: CPT

## 2020-09-24 PROCEDURE — 84156 ASSAY OF PROTEIN URINE: CPT

## 2020-09-24 PROCEDURE — 87040 BLOOD CULTURE FOR BACTERIA: CPT

## 2020-09-24 PROCEDURE — 71045 X-RAY EXAM CHEST 1 VIEW: CPT

## 2020-09-24 PROCEDURE — 83605 ASSAY OF LACTIC ACID: CPT

## 2020-09-24 PROCEDURE — 80053 COMPREHEN METABOLIC PANEL: CPT

## 2020-09-24 PROCEDURE — 99285 EMERGENCY DEPT VISIT HI MDM: CPT

## 2020-09-24 PROCEDURE — 87491 CHLMYD TRACH DNA AMP PROBE: CPT

## 2020-09-24 PROCEDURE — 2580000003 HC RX 258: Performed by: EMERGENCY MEDICINE

## 2020-09-24 PROCEDURE — 87510 GARDNER VAG DNA DIR PROBE: CPT

## 2020-09-24 PROCEDURE — 1200000000 HC SEMI PRIVATE

## 2020-09-24 PROCEDURE — 81001 URINALYSIS AUTO W/SCOPE: CPT

## 2020-09-24 PROCEDURE — 6360000002 HC RX W HCPCS: Performed by: EMERGENCY MEDICINE

## 2020-09-24 PROCEDURE — 82570 ASSAY OF URINE CREATININE: CPT

## 2020-09-24 PROCEDURE — 84484 ASSAY OF TROPONIN QUANT: CPT

## 2020-09-24 PROCEDURE — 85025 COMPLETE CBC W/AUTO DIFF WBC: CPT

## 2020-09-24 PROCEDURE — 87086 URINE CULTURE/COLONY COUNT: CPT

## 2020-09-24 PROCEDURE — 87480 CANDIDA DNA DIR PROBE: CPT

## 2020-09-24 PROCEDURE — 87591 N.GONORRHOEAE DNA AMP PROB: CPT

## 2020-09-24 RX ORDER — SODIUM CHLORIDE, SODIUM LACTATE, POTASSIUM CHLORIDE, AND CALCIUM CHLORIDE .6; .31; .03; .02 G/100ML; G/100ML; G/100ML; G/100ML
1000 INJECTION, SOLUTION INTRAVENOUS ONCE
Status: COMPLETED | OUTPATIENT
Start: 2020-09-24 | End: 2020-09-24

## 2020-09-24 RX ORDER — CLINDAMYCIN PHOSPHATE 900 MG/50ML
900 INJECTION INTRAVENOUS EVERY 8 HOURS
Status: DISCONTINUED | OUTPATIENT
Start: 2020-09-24 | End: 2020-09-26

## 2020-09-24 RX ORDER — VANCOMYCIN HYDROCHLORIDE 1 G/200ML
15 INJECTION, SOLUTION INTRAVENOUS ONCE
Status: DISCONTINUED | OUTPATIENT
Start: 2020-09-24 | End: 2020-09-24

## 2020-09-24 RX ORDER — CLINDAMYCIN PHOSPHATE 900 MG/50ML
900 INJECTION INTRAVENOUS EVERY 8 HOURS
Status: CANCELLED | OUTPATIENT
Start: 2020-09-25

## 2020-09-24 RX ADMIN — SODIUM CHLORIDE, POTASSIUM CHLORIDE, SODIUM LACTATE AND CALCIUM CHLORIDE 1000 ML: 600; 310; 30; 20 INJECTION, SOLUTION INTRAVENOUS at 19:50

## 2020-09-24 RX ADMIN — GENTAMICIN SULFATE 374 MG: 40 INJECTION, SOLUTION INTRAMUSCULAR; INTRAVENOUS at 21:11

## 2020-09-24 RX ADMIN — SODIUM CHLORIDE, POTASSIUM CHLORIDE, SODIUM LACTATE AND CALCIUM CHLORIDE 1000 ML: 600; 310; 30; 20 INJECTION, SOLUTION INTRAVENOUS at 19:20

## 2020-09-24 RX ADMIN — CLINDAMYCIN IN 5 PERCENT DEXTROSE 900 MG: 18 INJECTION, SOLUTION INTRAVENOUS at 22:43

## 2020-09-24 ASSESSMENT — PAIN DESCRIPTION - PAIN TYPE: TYPE: ACUTE PAIN

## 2020-09-24 ASSESSMENT — ENCOUNTER SYMPTOMS
RHINORRHEA: 0
ABDOMINAL PAIN: 0
BACK PAIN: 1
DIARRHEA: 0
VOMITING: 0
NAUSEA: 0
SORE THROAT: 0
COUGH: 0

## 2020-09-24 ASSESSMENT — PAIN SCALES - GENERAL: PAINLEVEL_OUTOF10: 10

## 2020-09-24 ASSESSMENT — PAIN DESCRIPTION - LOCATION: LOCATION: BACK;HEAD

## 2020-09-24 NOTE — TELEPHONE ENCOUNTER
Patient is 7 days postpartum (normal ) paging on-call CNM as a \"heads up\" that she is on her way to [facility]'s ER at this time. She reports she had a fever last evening that broke with Motrin and Tylenol, but states she currently has a fever over 102 despite 2 extra strength Tylenol. She reports intermittent numbness and tingling to her right fingers, some possible plugged milk ducts to her right breast due to ill fitting breast pump supplies. She does not feel that she has mastitis. She also reports shortness of breath and bilateral low back pain. Will follow up with pt after evaluation in ER.

## 2020-09-24 NOTE — ED NOTES
OB team at bedside again to evaluate the patient and perform pelvic exam     Syd Malone RN  09/24/20 Harry Handley RN  09/24/20 [id number]

## 2020-09-24 NOTE — CONSULTS
[address] [facility]    Patient Name: Ramez Crowder     Patient : 1990  Room/Bed:   Admission Date/Time: 2020  6:33 PM  Primary Care Physician: DENNY Elam CNP    Consulting Provider: Dr. Mckinley Webber  Reason for Consult: Postpartum fever     CC:   Chief Complaint   Patient presents with    Headache    Fever                HPI: Ramez Crowder is a 30 y.o. female  PPD#7 s/p  presents to the ED, c/o fever/chills and back pain x1 day. She is breast feeding, reports bilateral tenderness, denies redness, warmth or puss drainage. She denies heavy vaginal bleeding. Patient reports a foul odor to vaginal bleeding. Denies urinary complaints. Patient denies any other concerns or complaints. Patient's last menstrual period was 2019. REVIEW OF SYSTEMS:   A minimum of an eleven point review of systems was completed.     Constitutional: positive fever, positive chills  HEENT: negative visual disturbances, negative headaches  Respiratory: negative dyspnea, negative cough  Cardiovascular: negative chest pain,  negative palpitations  Gastrointestinal: positive abdominal pain, negative RUQ pain, negative N/V, negative diarrhea, negative constipation  Genitourinary: negative dysuria, negative vaginal discharge, positive vaginal bleeding  Dermatological: negative rash, negative wounds  Hematologic: negative bleeding/clotting disorder  Immunologic: negative recent illness, negative recent sick contact, positive allergic reactions  Lymphatic: negative lymph nodes  Musculoskeletal: positive back pain,negative myalgias, negative arthralgias  Neurological:  negative dizziness, negative weakness  Behavior/Psych: negative depression, negative anxiety  _______________________________________________________________________      OBSTETRICAL HISTORY:   OB History    Para Term  AB Living   3 1 1 0 1 1   SAB TAB Ectopic Molar Multiple Live Births   1 0 0 0 0 1      # Outcome Date GA Lbr Jaylen/2nd Weight Sex Delivery Anes PTL Lv   3 Current            2 Term 11/06/18 [id number]  6 lb 13 oz (3.09 kg) M Vag-Spont EPI N GLADYS      Birth Comments: Nitrous & Epidural      Name: Asher Larson: [address] [place] [address] West: 9   1 SAB 06/2017               PAST MEDICAL HISTORY:   has a past medical history of Anxiety, Depression, H/O borderline personality disorder, Head trauma in child, Herpes simplex virus (HSV) infection, PTSD (post-traumatic stress disorder), Social anxiety disorder, and Trauma. PAST SURGICAL HISTORY:   has a past surgical history that includes Colposcopy and Cedar tooth extraction.     ALLERGIES:  Allergies as of 09/24/2020 - Review Complete 09/24/2020   Allergen Reaction Noted    Other Rash 01/07/2020       MEDICATIONS:  Current Facility-Administered Medications   Medication Dose Route Frequency Provider Last Rate Last Dose    clindamycin (CLEOCIN) 900 mg in dextrose 5 % 50 mL IVPB  900 mg Intravenous Q8H Suly Pina MD 50 mL/hr at 09/24/20 2243 900 mg at 09/24/20 2243     Current Outpatient Medications   Medication Sig Dispense Refill    ibuprofen (ADVIL;MOTRIN) 600 MG tablet Take 1 tablet by mouth every 6 hours as needed for Pain 40 tablet 0    docusate sodium (COLACE) 100 MG capsule Take 1 capsule by mouth 2 times daily as needed for Constipation 60 capsule 0    valACYclovir (VALTREX) 500 MG tablet Take 1 tablet by mouth 2 times daily 60 tablet 1    acetaminophen (TYLENOL) 500 MG tablet Take 500 mg by mouth every 6 hours as needed for Pain      calcium carbonate (TUMS E-X 750) 750 MG chewable tablet Take 2 tablets by mouth daily Indications: \"heartburn\"      ondansetron (ZOFRAN) 4 MG tablet Take 1 tablet by mouth every 8 hours as needed for Nausea or Vomiting 15 tablet 0    phenazopyridine (PYRIDIUM) 100 MG tablet Take 1 tablet by mouth 3 times daily as needed for Pain 10 tablet 0    Prenatal Vit-Fe Fumarate-FA (PRENATAL VITAMIN PO) Take 1 capsule by mouth daily         FAMILY HISTORY:  Family History of Breast, Ovarian, Colon or Uterine Cancer: No   family history includes Alcohol Abuse in her father; Alzheimer's Disease in her maternal grandfather; Anxiety Disorder in her maternal grandmother; Depression in her father; Diabetes in her brother; Diabetes type 2  in her maternal grandmother; Drug Abuse in her father; Heart Disease in her paternal grandfather, paternal grandmother, and another family member; Heart Failure in her maternal grandfather; No Known Problems in her sister;  Labor in her mother; Celis Dyers Abortions in her maternal grandmother; Thyroid Disease in her mother. SOCIAL HISTORY:   reports that she has quit smoking. Her smoking use included cigarettes. She has never used smokeless tobacco. She reports that she does not drink alcohol or use drugs. ________________________________________________________________________                                    Jose Brewer:  Vitals:    20 1847   BP: 129/75   Pulse: 130   Resp: 16   Temp: 100.8 °F (38.2 °C)   TempSrc: Temporal   SpO2: 98%   Weight: 165 lb (74.8 kg)   Height: 5' 3\" (1.6 m)                                                    INPUT/OUTPUT:  No intake/output data recorded. In: 2250   Out: -                                                                                                                                PHYSICAL EXAM:     General Appearance: Appears uncomfortable. Visibly shaking with chills  Skin: Skin color, texture, turgor normal. No rashes or lesions. Lymphatic: No abnormally enlarged lymph nodes. Neck and EENT: normal atraumatic, no neck masses, normal thyroid  Respiratory: Normal expansion. Clear to auscultation. No rales, rhonchi, wheezing. Cardiovascular: normal, regular rate and rhythm, no murmurs, rubs, or gallops  Breast: breasts appear normal, symmetrical, no suspicious masses, no skin or nipple changes.  No axillary nodes, no nipple discharge. No signs of mastitis  Abdomen: fundal tenderness, soft, non-distended, no right upper quadrant tenderness, CVA tenderness noted bilaterally, no rebound, guarding, or rigidity, no abdominal scars  Pelvic Exam:   External genitalia: General appearance; normal, Hair distribution; normal, Lesions absent  Urinary system: urethral meatus normal  Vaginal: dark red blood noted in vaginal vault  Cervix: normal appearing cervix without discharge or lesions  Uterus: tender upon palpation  Rectal Exam: not indicated  Musculoskeletal: no gross abnormalities  Extremities: non-tender BLE and non-edematous  Psych:  oriented to time, place and person     LAB RESULTS:  Results for orders placed or performed during the hospital encounter of 09/24/20   Culture, Blood 1    Specimen: Blood   Result Value Ref Range    Specimen Description . BLOOD     Special Requests L AC 20ML     Culture NO GROWTH 1 HOUR    Culture, Blood 1    Specimen: Blood   Result Value Ref Range    Specimen Description . BLOOD     Special Requests NOT REPORTED     Culture NO GROWTH 1 HOUR    VAGINITIS DNA PROBE    Specimen: Vaginal   Result Value Ref Range    Specimen Description . VAGINA     Special Requests NOT REPORTED     Direct Exam NEGATIVE for Trichomonas vaginalis     Direct Exam NEGATIVE for Candida sp. Direct Exam NEGATIVE for Gardnerella vaginalis     Direct Exam       Method of testing is a DNA probe intended for detection and identification of Candida species, Gardnerella vaginalis, and Trichomonas vaginalis nucleic acid in vaginal fluid specimens from patients with symptoms of vaginitis/vaginosis.    Urinalysis with Microscopic   Result Value Ref Range    Color, UA ORANGE (A) YELLOW    Turbidity UA CLOUDY (A) CLEAR    Glucose, Ur NEGATIVE NEGATIVE    Bilirubin Urine NEGATIVE NEGATIVE    Ketones, Urine LARGE (A) NEGATIVE    Specific Gravity, UA 1.018 1.005 - 1.030    Urine Hgb LARGE (A) NEGATIVE    pH, UA 6.5 5.0 - 8.0    Protein, UA 1+ (A) NEGATIVE    Urobilinogen, Urine Normal Normal    Nitrite, Urine NEGATIVE NEGATIVE    Leukocyte Esterase, Urine SMALL (A) NEGATIVE    -          WBC, UA 20 TO 50 0 - 5 /HPF    RBC, UA TOO NUMEROUS TO COUNT 0 - 4 /HPF    Casts UA  0 - 8 /LPF     0 TO 2 HYALINE Reference range defined for non-centrifuged specimen. Crystals, UA NOT REPORTED None /HPF    Epithelial Cells UA 2 TO 5 0 - 5 /HPF    Renal Epithelial, UA NOT REPORTED 0 /HPF    Bacteria, UA NOT REPORTED None    Mucus, UA NOT REPORTED None    Trichomonas, UA NOT REPORTED None    Amorphous, UA NOT REPORTED None    Other Observations UA NOT REPORTED NOT REQ.     Yeast, UA NOT REPORTED None   CBC WITH AUTO DIFFERENTIAL   Result Value Ref Range    WBC 10.7 3.5 - 11.3 k/uL    RBC 4.01 3.95 - 5.11 m/uL    Hemoglobin 9.6 (L) 11.9 - 15.1 g/dL    Hematocrit 32.8 (L) 36.3 - 47.1 %    MCV 81.8 (L) 82.6 - 102.9 fL    MCH 23.9 (L) 25.2 - 33.5 pg    MCHC 29.3 28.4 - 34.8 g/dL    RDW 16.8 (H) 11.8 - 14.4 %    Platelets 763 382 - 601 k/uL    MPV 9.4 8.1 - 13.5 fL    NRBC Automated 0.0 0.0 per 100 WBC    Differential Type NOT REPORTED     Seg Neutrophils 82 (H) 36 - 65 %    Lymphocytes 11 (L) 24 - 43 %    Monocytes 6 3 - 12 %    Eosinophils % 1 1 - 4 %    Basophils 0 0 - 2 %    Immature Granulocytes 0 0 %    Segs Absolute 8.74 (H) 1.50 - 8.10 k/uL    Absolute Lymph # 1.14 1.10 - 3.70 k/uL    Absolute Mono # 0.65 0.10 - 1.20 k/uL    Absolute Eos # 0.13 0.00 - 0.44 k/uL    Basophils Absolute 0.03 0.00 - 0.20 k/uL    Absolute Immature Granulocyte 0.04 0.00 - 0.30 k/uL    WBC Morphology NOT REPORTED     RBC Morphology ANISOCYTOSIS PRESENT     Platelet Estimate NOT REPORTED    COMPREHENSIVE METABOLIC PANEL   Result Value Ref Range    Glucose 80 70 - 99 mg/dL    BUN 11 6 - 20 mg/dL    CREATININE 0.44 (L) 0.50 - 0.90 mg/dL    Bun/Cre Ratio NOT REPORTED 9 - 20    Calcium 8.6 8.6 - 10.4 mg/dL    Sodium 141 135 - 144 mmol/L    Potassium 3.9 3.7 - 5.3 mmol/L    Chloride 106 98 - 107 mmol/L    CO2 21 20 - 31 mmol/L    Anion Gap 14 9 - 17 mmol/L    Alkaline Phosphatase 135 (H) 35 - 104 U/L    ALT 20 5 - 33 U/L    AST 18 <32 U/L    Total Bilirubin 0.38 0.3 - 1.2 mg/dL    Total Protein 6.8 6.4 - 8.3 g/dL    Alb 3.6 3.5 - 5.2 g/dL    Albumin/Globulin Ratio 1.1 1.0 - 2.5    GFR Non-African American >60 >60 mL/min    GFR African American >60 >60 mL/min    GFR Comment          GFR Staging NOT REPORTED    PROTIME-INR   Result Value Ref Range    Protime 9.4 9.0 - 12.0 sec    INR 0.9    APTT   Result Value Ref Range    PTT 25.6 20.5 - 30.5 sec   LACTIC ACID, WHOLE BLOOD   Result Value Ref Range    Lactic Acid, Whole Blood 2.6 (H) 0.7 - 2.1 mmol/L   COVID-19    Specimen: Other   Result Value Ref Range    SARS-CoV-2          SARS-CoV-2, Rapid Not Detected Not Detected    Source . NASOPHARYNGEAL SWAB     SARS-CoV-2         Brain Natriuretic Peptide   Result Value Ref Range    Pro-BNP 44 <300 pg/mL    BNP Interpretation Pro-BNP Reference Range:    Troponin   Result Value Ref Range    Troponin, High Sensitivity <6 0 - 14 ng/L    Troponin T NOT REPORTED <0.03 ng/mL    Troponin Interp NOT REPORTED    Lactic Acid, Whole Blood   Result Value Ref Range    Lactic Acid, Whole Blood 1.2 0.7 - 2.1 mmol/L         DIAGNOSTICS:    No results found.     ASSESSMENT & PLAN:    Adri Maradiaga is a 30 y.o. female  PPD #7 s/p     Postpartum endometritis   - Patient c/o fever, chills, back pain, foul smelling lochia   - Temp in .8   - Tachycardic, remainder VSS   - SSE: normal appearing cervix without lesions or discharge, dark red blood noted in vaginal vault   - GC/C collected   - Vaginitis negative   - UA low suspicion for UTI   - PT/PTT wnl   - Blood culture pending   - Urine culture pending   - LA 2.6   - CXR: cardiac silhouette borderline prominent and mild central vascular congestion    - COVID neg    - Plan to admit and begin treatment with Clindamycin and Gentamicin    Breastfeeding   - c/o breast tenderness   - breast exam wnl, no erythema noted  Hx Depression/Anxiety   - mood stable   - no meds   - denies SI/HI    BMI 29.2      Patient Active Problem List    Diagnosis Date Noted    Postpartum endometritis 2020     21 F APG9,9 Wt7-4 2020    Anxiety     Herpes genitalis in women 2018     · suppressive therapy at 36 weeks      History of depression 2018     · In therapy, does not desires medications  · EPDS _         Plan discussed with Dr. Leonard Charles, who is agreeable.      Attending's Name: Dr. Ting Charles DO  Ob/Gyn Resident  Pager: [phone]  [address]  2020, 11:02 PM

## 2020-09-24 NOTE — ED PROVIDER NOTES
[facility]  Emergency Department Encounter  Emergency Medicine Resident     Pt Name: Chinyere Pappas  MRN: [id number]  [place] 1990  Date of evaluation: 9/24/20  PCP:  DENNY Brown [id number]       Chief Complaint   Patient presents with    Headache    Fever       HISTORY OF PRESENT ILLNESS  (Location/Symptom, Timing/Onset, Context/Setting, Quality, Duration, Modifying Factors, Severity.)    Chinyere Pappas is a 30 y.o. female who presents with 24 hours of headache, lower back pain, fevers with T-max of 102.2 at home. Patient is 1 week postpartum from an uncomplicated vaginal birth. She states that her vaginal bleeding had been decreasing until today where she feels like she has been passing large blood clots that are \"funny smelling \". Did take Tylenol and Motrin prior to arrival in the emergency department. She has had no known sick exposures. She denies any lightheadedness or dizziness, vision changes, neck pain or stiffness, abdominal pain, nausea vomiting or diarrhea, cough, congestion, sore throat, urinary urgency or frequency, pain with urination, difficulty urinating. PAST MEDICAL / SURGICAL / SOCIAL / FAMILY HISTORY    has a past medical history of Anxiety, Depression, H/O borderline personality disorder, Head trauma in child, Herpes simplex virus (HSV) infection, PTSD (post-traumatic stress disorder), Social anxiety disorder, and Trauma. has a past surgical history that includes Colposcopy and Goodspring tooth extraction.     Social History     Socioeconomic History    Marital status: Single     Spouse name: Not on file    Number of children: 1    Years of education: Not on file    Highest education level: Not on file   Occupational History    Not on file   Social Needs    Financial resource strain: Not very hard    Food insecurity     Worry: Never true     Inability: Never true    Transportation needs     Medical: No     Non-medical: No Tobacco Use    Smoking status: Former Smoker     Types: Cigarettes    Smokeless tobacco: Never Used    Tobacco comment: off and on for several years    Substance and Sexual Activity    Alcohol use: No    Drug use: No    Sexual activity: Yes     Partners: Male     Comment: no BC    Lifestyle    Physical activity     Days per week: Not on file     Minutes per session: Not on file    Stress: Not on file   Relationships    Social connections     Talks on phone: Not on file     Gets together: Not on file     Attends Islam service: Not on file     Active member of club or organization: Not on file     Attends meetings of clubs or organizations: Not on file     Relationship status: Not on file    Intimate partner violence     Fear of current or ex partner: Not on file     Emotionally abused: Not on file     Physically abused: Not on file     Forced sexual activity: Not on file   Other Topics Concern    Not on file   Social History Narrative    Not on file       Family History   Problem Relation Age of Onset    Thyroid Disease Mother      Labor Mother         1 child born at 42 weeks     Diabetes Brother         unsure if due to MVA damaging his pancreaus     Spont Abortions Maternal Grandmother     Diabetes type 2  Maternal Grandmother     Anxiety Disorder Maternal Grandmother     Alcohol Abuse Father     Depression Father     Drug Abuse Father     No Known Problems Sister     Heart Failure Maternal Grandfather         COD     Alzheimer's Disease Maternal Grandfather     Heart Disease Paternal Grandmother     Heart Disease Paternal Grandfather     Heart Disease Other         COD     Breast Cancer Neg Hx     Cancer Neg Hx     Colon Cancer Neg Hx     Eclampsia Neg Hx     Hypertension Neg Hx     Ovarian Cancer Neg Hx     Stroke Neg Hx        Allergies: Other    Home Medications:  Prior to Admission medications    Medication Sig Start Date End Date Taking?  Authorizing Provider ibuprofen (ADVIL;MOTRIN) 600 MG tablet Take 1 tablet by mouth every 6 hours as needed for Pain 9/17/20   Lilia Felder DO   docusate sodium (COLACE) 100 MG capsule Take 1 capsule by mouth 2 times daily as needed for Constipation 9/17/20   Lilia Felder DO   valACYclovir (VALTREX) 500 MG tablet Take 1 tablet by mouth 2 times daily 8/18/20   DENNY Richmond - JERRY   acetaminophen (TYLENOL) 500 MG tablet Take 500 mg by mouth every 6 hours as needed for Pain    Historical Provider, MD   calcium carbonate (TUMS E-X 750) 750 MG chewable tablet Take 2 tablets by mouth daily Indications: \"heartburn\"    Historical Provider, MD   ondansetron (ZOFRAN) 4 MG tablet Take 1 tablet by mouth every 8 hours as needed for Nausea or Vomiting 3/26/20   DENNY Richmond - JERRY   phenazopyridine (PYRIDIUM) 100 MG tablet Take 1 tablet by mouth 3 times daily as needed for Pain 2/28/20 2/27/21  DENNY Wyatt CNM   Prenatal Vit-Fe Fumarate-FA (PRENATAL VITAMIN PO) Take 1 capsule by mouth daily    Historical Provider, MD       REVIEW OF SYSTEMS    (2-9 systems for level 4, 10 or more for level 5)    Review of Systems   Constitutional: Positive for chills and fever. HENT: Negative for congestion, rhinorrhea and sore throat. Respiratory: Negative for cough. Cardiovascular: Negative for chest pain. Gastrointestinal: Negative for abdominal pain, diarrhea, nausea and vomiting. Endocrine: Negative for polyuria. Genitourinary: Positive for vaginal bleeding. Negative for decreased urine volume, difficulty urinating, dysuria, flank pain, frequency and urgency. Musculoskeletal: Positive for back pain and myalgias. Negative for neck pain and neck stiffness. Skin: Negative for rash. Allergic/Immunologic: Negative for immunocompromised state. Neurological: Positive for headaches. Negative for dizziness, syncope, weakness and light-headedness. Psychiatric/Behavioral: Negative for confusion.        PHYSICAL EXAM   (up to 7 for level 4, 8 or more for level 5)    VITALS:   Vitals:    09/24/20 1847   BP: 129/75   Pulse: 130   Resp: 16   Temp: 100.8 °F (38.2 °C)   TempSrc: Temporal   SpO2: 98%   Weight: 165 lb (74.8 kg)   Height: 5' 3\" (1.6 m)       Physical Exam  Vitals signs and nursing note reviewed. Constitutional:       General: She is not in acute distress. Appearance: She is well-developed. She is ill-appearing. She is not diaphoretic. HENT:      Head: Normocephalic and atraumatic. Right Ear: External ear normal.      Left Ear: External ear normal.      Nose: Nose normal.      Mouth/Throat:      Mouth: Mucous membranes are moist.   Eyes:      Conjunctiva/sclera: Conjunctivae normal.   Neck:      Musculoskeletal: Full passive range of motion without pain, normal range of motion and neck supple. Meningeal: Brudzinski's sign and Kernig's sign absent. Cardiovascular:      Rate and Rhythm: Regular rhythm. Tachycardia present. Pulses: Normal pulses. Heart sounds: Normal heart sounds. No murmur. Pulmonary:      Effort: Pulmonary effort is normal. No respiratory distress. Breath sounds: Normal breath sounds. No wheezing, rhonchi or rales. Abdominal:      General: There is no distension. Palpations: Abdomen is soft. Tenderness: There is no abdominal tenderness. There is no guarding or rebound. Musculoskeletal: Normal range of motion. Right lower leg: No edema. Left lower leg: No edema. Skin:     General: Skin is warm and dry. Coloration: Skin is not pale. Neurological:      General: No focal deficit present. Mental Status: She is alert and oriented to person, place, and time. GCS: GCS eye subscore is 4. GCS verbal subscore is 5. GCS motor subscore is 6. Cranial Nerves: Cranial nerves are intact. Sensory: Sensation is intact. Motor: Motor function is intact. Coordination: Coordination is intact.    Psychiatric:         Behavior: Behavior normal.         DIFFERENTIAL  DIAGNOSIS   PLAN (LABS / IMAGING / EKG):  Orders Placed This Encounter   Procedures    Culture, Urine    Culture, Blood 1    Culture, Blood 1    VAGINITIS DNA PROBE    C.trachomatis N.gonorrhoeae DNA    XR CHEST PORTABLE    Urinalysis with Microscopic    CBC WITH AUTO DIFFERENTIAL    COMPREHENSIVE METABOLIC PANEL    PROTIME-INR    APTT    LACTIC ACID, WHOLE BLOOD    COVID-19    GENTAMICIN LEVEL, RANDOM    Brain Natriuretic Peptide    Troponin    Telemetry monitoring    Vaginal exam    Inpatient consult to Obstetrics / Gynecology    Pharmacy to Dose: Gentamicin Postpartum endometritis    Insert peripheral IV    Transfer Patient    PATIENT STATUS (FROM ED OR OR/PROCEDURAL) Inpatient       MEDICATIONS ORDERED:  Orders Placed This Encounter   Medications    lactated ringers bolus    lactated ringers bolus    DISCONTD: piperacillin-tazobactam (ZOSYN) 3.375 g in dextrose 5 % 50 mL IVPB (mini-bag)     Order Specific Question:   Antimicrobial Indications     Answer:   OB/Gyn Infection    DISCONTD: vancomycin (VANCOCIN) 1000 mg in dextrose 5% 200 mL IVPB     Order Specific Question:   Antimicrobial Indications     Answer:   Sepsis of Unknown Etiology    gentamicin (GARAMYCIN) 374 mg in dextrose 5 % 250 mL IVPB     Order Specific Question:   Antimicrobial Indications     Answer:   OB/Gyn Infection    clindamycin (CLEOCIN) 900 mg in dextrose 5 % 50 mL IVPB     Order Specific Question:   Antimicrobial Indications     Answer:   OB/Gyn Infection       DDX:   Spinal headache, and drum, meningitis, urinary tract infection, endometritis, retained products, pyelonephritis    DIAGNOSTIC RESULTS / EMERGENCYDEPARTMENT COURSE / MDM   LABS:  Labs Reviewed   URINALYSIS WITH MICROSCOPIC - Abnormal; Notable for the following components:       Result Value    Color, UA ORANGE (*)     Turbidity UA CLOUDY (*)     Ketones, Urine LARGE (*)     Urine Hgb LARGE (*)     Protein, UA 1+ (*) Leukocyte Esterase, Urine SMALL (*)     All other components within normal limits   CBC WITH AUTO DIFFERENTIAL - Abnormal; Notable for the following components:    Hemoglobin 9.6 (*)     Hematocrit 32.8 (*)     MCV 81.8 (*)     MCH 23.9 (*)     RDW 16.8 (*)     Seg Neutrophils 82 (*)     Lymphocytes 11 (*)     Segs Absolute 8.74 (*)     All other components within normal limits   COMPREHENSIVE METABOLIC PANEL - Abnormal; Notable for the following components:    CREATININE 0.44 (*)     Alkaline Phosphatase 135 (*)     All other components within normal limits   LACTIC ACID, WHOLE BLOOD - Abnormal; Notable for the following components:    Lactic Acid, Whole Blood 2.6 (*)     All other components within normal limits   VAGINITIS DNA PROBE   CULTURE, URINE   CULTURE, BLOOD 1   CULTURE, BLOOD 1   C.TRACHOMATIS N.GONORRHOEAE DNA   PROTIME-INR   APTT   COVID-19   LACTIC ACID, WHOLE BLOOD   GENTAMICIN LEVEL, RANDOM   BRAIN NATRIURETIC PEPTIDE   TROPONIN       RADIOLOGY:  Xr Chest Portable    Result Date: 9/24/2020  EXAMINATION: ONE XRAY VIEW OF THE CHEST 9/24/2020 8:17 pm COMPARISON: None. HISTORY: ORDERING SYSTEM PROVIDED HISTORY: 1 week post-partum sob TECHNOLOGIST PROVIDED HISTORY: 1 week post-partum sob Reason for Exam: 1 week post-partum; SOB Acuity: Acute Type of Exam: Initial FINDINGS: Single portable frontal view of the chest is submitted for review. The cardiac silhouette is borderline prominent. Mild central vascular congestion without focal airspace consolidation, sizeable pleural effusion, or pneumothorax. Trachea is midline. Visualized osseous structures and soft tissues are grossly intact. Cardiac silhouette is borderline prominent and there is mild central vascular congestion. No focal airspace consolidation, sizeable pleural effusion or pneumothorax. EMERGENCY DEPARTMENT COURSE:  ED Course as of Sep 24 2150   Thu Sep 24, 2020   1925 Spoke to [facility]    [AM]   1933 OB will come down.  I requested that they do pelvic due to 1 week post partum and concern for possible endometritis      [AM]   2011 Lactic Acid, Whole Blood(!): 2.6 [AM]   2012 CBC WITH AUTO DIFFERENTIAL(!):    WBC 10.7   RBC 4.01   Hemoglobin Quant 9.6(!)   Hematocrit 32.8(!)   MCV 81.8(!)   MCH 23.9(!)   MCHC 29.3   RDW 16.8(!)   Platelet Count 940   MPV 9.4   NRBC Automated 0.0   Differential Type NOT REPORTED   Seg Neutrophils 82(!)   Lymphocytes 11(!)   Monocytes 6   Eosinophils % 1   Basophils 0   Immature Granulocytes 0   Segs Absolute 8.74(!)   Absolute Lymph # 1.14   Absolute Mono # 0.65   Absolute Eos # 0.13   Basophils Absolute 0.03   Absolute Immature G... [AM]   2021 COMPREHENSIVE METABOLIC PANEL(!):    Glucose 80   BUN 11   Creatinine 0.44(!)   Bun/Cre Ratio NOT REPORTED   Calcium 8.6   Sodium 141   Potassium 3.9   Chloride 106   CO2 21   Anion Gap 14   Alk Phos 135(!)   ALT 20   AST 18   Bilirubin 0.38   Total Protein 6.8   Albumin 3.6   Albumin/Globulin Ratio 1.1   GFR Non- >60   GFR  >60   GFR Comment        GFR Staging NOT REPORTED [AM]   2040 Troponin and BNP added on   XR CHEST PORTABLE [AM]   2121 SARS-CoV-2, Rapid: Not Detected [AM]   2145 Called lab to inform them of add on labs    [AM]   2149 VAGINITIS DNA PROBE:    Specimen Description . VAGINA   Special Requests NOT REPORTED   DIRECT EXAM. NEGATIVE for Trichomonas vaginalis   DIRECT EXAM. NEGATIVE for Candida sp. DIRECT EXAM. NEGATIVE for Gardnerella vaginalis   DIRECT EXAM. Method of testing is a DNA probe intended for detection and identification of Candida species, Gardnerella vaginalis, and Trichomonas vaginalis nucleic acid in vaginal fluid specimens from patients with symptoms of vaginitis/vaginosis.  [AM]      ED Course User Index  [AM] Campbell Kirk DO       MDM  Number of Diagnoses or Management Options  Endometritis:   Septicemia [facility]):   Diagnosis management comments: 27-year-old 1 week postpartum who presents the emergency department with 24 hours of generalized malaise, fever, body aches, headache, back pain. Patient had vaginal birth, did receive epidural.  Initial evaluation patient ill-appearing, febrile, tachycardic. No hypotension or hypoxia. Heart tachycardic, lung sounds clear, no abdominal tenderness. Due to change in vaginal bleeding and Sirs response concern for possible endometritis. Full range of motion of neck with no stiffness, negative meningeal signs. Also recent epidural during birth some more likely spinal headache. Septic labs ordered. Consulted OB/GYN who came to bedside and perform pelvic exam.  Consulted with them about antibiotics of choice which were gentamicin and clindamycin. Patient given fluid boluses while in the emergency department for elevated lactic acid. Patient admitted to OB/GYN for further treatment. Chest x-ray concerning for mild vascular congestion. Troponin and BNP added on       Amount and/or Complexity of Data Reviewed  Clinical lab tests: ordered and reviewed  Tests in the radiology section of CPT®: ordered and reviewed  Review and summarize past medical records: yes  Discuss the patient with other providers: yes  Independent visualization of images, tracings, or specimens: yes    Patient Progress  Patient progress: stable      CONSULTS:  IP CONSULT TO OB GYN  IP CONSULT TO PHARMACY    CRITICAL CARE:  Please see attending note    FINAL IMPRESSION     1. Septicemia ([address]    2. Endometritis       DISPOSITION / PLAN   DISPOSITION Admitted 09/24/2020 08:18:17 PM    Rhoda Rider  Emergency Medicine Resident Physician, PGY-3    (Please note that portions of this note were completed with a voice recognition program.  Efforts were made to edit the dictations but occasionally words are mis-transcribed.)        [facility],   Resident  09/24/20 5807       [facility],   Resident  09/24/20 2151

## 2020-09-25 LAB
ABSOLUTE EOS #: 0.1 K/UL (ref 0–0.44)
ABSOLUTE IMMATURE GRANULOCYTE: 0 K/UL (ref 0–0.3)
ABSOLUTE LYMPH #: 1.24 K/UL (ref 1.1–3.7)
ABSOLUTE MONO #: 0.57 K/UL (ref 0.1–1.2)
BASOPHILS # BLD: 0 % (ref 0–2)
BASOPHILS ABSOLUTE: 0 K/UL (ref 0–0.2)
C TRACH DNA GENITAL QL NAA+PROBE: NEGATIVE
CREATININE URINE: 60.3 MG/DL (ref 28–217)
CULTURE: NORMAL
DIFFERENTIAL TYPE: ABNORMAL
EOSINOPHILS RELATIVE PERCENT: 1 % (ref 1–4)
GENT RANDOM DATE LAST DOSE: NORMAL
GENT RANDOM DOSE AMOUNT: NORMAL
GENT RANDOM DOSE TIME: NORMAL
GENTAMICIN RANDOM: <0.3 UG/ML
HCT VFR BLD CALC: 29 % (ref 36.3–47.1)
HEMOGLOBIN: 8.3 G/DL (ref 11.9–15.1)
IMMATURE GRANULOCYTES: 0 %
LACTIC ACID, WHOLE BLOOD: 0.8 MMOL/L (ref 0.7–2.1)
LYMPHOCYTES # BLD: 13 % (ref 24–43)
Lab: NORMAL
MCH RBC QN AUTO: 24.2 PG (ref 25.2–33.5)
MCHC RBC AUTO-ENTMCNC: 28.6 G/DL (ref 28.4–34.8)
MCV RBC AUTO: 84.5 FL (ref 82.6–102.9)
MONOCYTES # BLD: 6 % (ref 3–12)
MORPHOLOGY: ABNORMAL
N. GONORRHOEAE DNA: NEGATIVE
NRBC AUTOMATED: 0 PER 100 WBC
PDW BLD-RTO: 17.4 % (ref 11.8–14.4)
PLATELET # BLD: 240 K/UL (ref 138–453)
PLATELET ESTIMATE: ABNORMAL
PMV BLD AUTO: 9.5 FL (ref 8.1–13.5)
RBC # BLD: 3.43 M/UL (ref 3.95–5.11)
RBC # BLD: ABNORMAL 10*6/UL
SEG NEUTROPHILS: 80 % (ref 36–65)
SEGMENTED NEUTROPHILS ABSOLUTE COUNT: 7.59 K/UL (ref 1.5–8.1)
SPECIMEN DESCRIPTION: NORMAL
SPECIMEN DESCRIPTION: NORMAL
TOTAL PROTEIN, URINE: 70 MG/DL
URINE TOTAL PROTEIN CREATININE RATIO: 1.16 (ref 0–0.2)
WBC # BLD: 9.5 K/UL (ref 3.5–11.3)
WBC # BLD: ABNORMAL 10*3/UL

## 2020-09-25 PROCEDURE — 99231 SBSQ HOSP IP/OBS SF/LOW 25: CPT | Performed by: OBSTETRICS & GYNECOLOGY

## 2020-09-25 PROCEDURE — 83605 ASSAY OF LACTIC ACID: CPT

## 2020-09-25 PROCEDURE — 6370000000 HC RX 637 (ALT 250 FOR IP): Performed by: STUDENT IN AN ORGANIZED HEALTH CARE EDUCATION/TRAINING PROGRAM

## 2020-09-25 PROCEDURE — 2580000003 HC RX 258: Performed by: STUDENT IN AN ORGANIZED HEALTH CARE EDUCATION/TRAINING PROGRAM

## 2020-09-25 PROCEDURE — 2500000003 HC RX 250 WO HCPCS: Performed by: STUDENT IN AN ORGANIZED HEALTH CARE EDUCATION/TRAINING PROGRAM

## 2020-09-25 PROCEDURE — 80170 ASSAY OF GENTAMICIN: CPT

## 2020-09-25 PROCEDURE — 6360000002 HC RX W HCPCS: Performed by: STUDENT IN AN ORGANIZED HEALTH CARE EDUCATION/TRAINING PROGRAM

## 2020-09-25 PROCEDURE — 36415 COLL VENOUS BLD VENIPUNCTURE: CPT

## 2020-09-25 PROCEDURE — 99222 1ST HOSP IP/OBS MODERATE 55: CPT | Performed by: OBSTETRICS & GYNECOLOGY

## 2020-09-25 PROCEDURE — 1200000000 HC SEMI PRIVATE

## 2020-09-25 PROCEDURE — 85025 COMPLETE CBC W/AUTO DIFF WBC: CPT

## 2020-09-25 RX ORDER — DIPHENHYDRAMINE HCL 25 MG
25 TABLET ORAL EVERY 6 HOURS PRN
Status: DISCONTINUED | OUTPATIENT
Start: 2020-09-25 | End: 2020-09-26 | Stop reason: HOSPADM

## 2020-09-25 RX ORDER — ACETAMINOPHEN 500 MG
1000 TABLET ORAL EVERY 6 HOURS PRN
Status: DISCONTINUED | OUTPATIENT
Start: 2020-09-25 | End: 2020-09-26 | Stop reason: HOSPADM

## 2020-09-25 RX ORDER — SODIUM CHLORIDE 9 MG/ML
INJECTION, SOLUTION INTRAVENOUS CONTINUOUS
Status: DISCONTINUED | OUTPATIENT
Start: 2020-09-25 | End: 2020-09-26 | Stop reason: HOSPADM

## 2020-09-25 RX ORDER — IBUPROFEN 400 MG/1
600 TABLET ORAL EVERY 6 HOURS PRN
Status: DISCONTINUED | OUTPATIENT
Start: 2020-09-25 | End: 2020-09-26 | Stop reason: HOSPADM

## 2020-09-25 RX ORDER — ONDANSETRON 2 MG/ML
4 INJECTION INTRAMUSCULAR; INTRAVENOUS EVERY 6 HOURS PRN
Status: DISCONTINUED | OUTPATIENT
Start: 2020-09-25 | End: 2020-09-26 | Stop reason: HOSPADM

## 2020-09-25 RX ORDER — CALCIUM CARBONATE 200(500)MG
500 TABLET,CHEWABLE ORAL 3 TIMES DAILY PRN
Status: DISCONTINUED | OUTPATIENT
Start: 2020-09-25 | End: 2020-09-26 | Stop reason: HOSPADM

## 2020-09-25 RX ORDER — SODIUM CHLORIDE 0.9 % (FLUSH) 0.9 %
10 SYRINGE (ML) INJECTION PRN
Status: DISCONTINUED | OUTPATIENT
Start: 2020-09-25 | End: 2020-09-26 | Stop reason: HOSPADM

## 2020-09-25 RX ORDER — DOCUSATE SODIUM 100 MG/1
100 CAPSULE, LIQUID FILLED ORAL 2 TIMES DAILY PRN
Status: DISCONTINUED | OUTPATIENT
Start: 2020-09-25 | End: 2020-09-26 | Stop reason: HOSPADM

## 2020-09-25 RX ORDER — DOCUSATE SODIUM 100 MG/1
100 CAPSULE, LIQUID FILLED ORAL 2 TIMES DAILY
Status: DISCONTINUED | OUTPATIENT
Start: 2020-09-25 | End: 2020-09-26 | Stop reason: HOSPADM

## 2020-09-25 RX ORDER — SODIUM CHLORIDE 0.9 % (FLUSH) 0.9 %
10 SYRINGE (ML) INJECTION EVERY 12 HOURS SCHEDULED
Status: DISCONTINUED | OUTPATIENT
Start: 2020-09-25 | End: 2020-09-26 | Stop reason: HOSPADM

## 2020-09-25 RX ORDER — ONDANSETRON 4 MG/1
4 TABLET, ORALLY DISINTEGRATING ORAL EVERY 8 HOURS PRN
Status: DISCONTINUED | OUTPATIENT
Start: 2020-09-25 | End: 2020-09-26 | Stop reason: HOSPADM

## 2020-09-25 RX ADMIN — SODIUM CHLORIDE: 9 INJECTION, SOLUTION INTRAVENOUS at 01:13

## 2020-09-25 RX ADMIN — DOCUSATE SODIUM 100 MG: 100 CAPSULE, LIQUID FILLED ORAL at 01:13

## 2020-09-25 RX ADMIN — ACETAMINOPHEN 1000 MG: 500 TABLET ORAL at 01:13

## 2020-09-25 RX ADMIN — SODIUM CHLORIDE: 9 INJECTION, SOLUTION INTRAVENOUS at 08:15

## 2020-09-25 RX ADMIN — IBUPROFEN 600 MG: 400 TABLET, FILM COATED ORAL at 02:59

## 2020-09-25 RX ADMIN — CLINDAMYCIN IN 5 PERCENT DEXTROSE 900 MG: 18 INJECTION, SOLUTION INTRAVENOUS at 05:56

## 2020-09-25 RX ADMIN — GENTAMICIN SULFATE 376 MG: 40 INJECTION, SOLUTION INTRAMUSCULAR; INTRAVENOUS at 22:36

## 2020-09-25 RX ADMIN — Medication 10 ML: at 08:15

## 2020-09-25 RX ADMIN — CLINDAMYCIN IN 5 PERCENT DEXTROSE 900 MG: 18 INJECTION, SOLUTION INTRAVENOUS at 12:53

## 2020-09-25 RX ADMIN — CLINDAMYCIN IN 5 PERCENT DEXTROSE 900 MG: 18 INJECTION, SOLUTION INTRAVENOUS at 21:13

## 2020-09-25 RX ADMIN — ONDANSETRON 4 MG: 2 INJECTION INTRAMUSCULAR; INTRAVENOUS at 01:13

## 2020-09-25 RX ADMIN — DOCUSATE SODIUM 100 MG: 100 CAPSULE, LIQUID FILLED ORAL at 21:13

## 2020-09-25 RX ADMIN — ACETAMINOPHEN 1000 MG: 500 TABLET ORAL at 16:05

## 2020-09-25 ASSESSMENT — PAIN SCALES - GENERAL
PAINLEVEL_OUTOF10: 3
PAINLEVEL_OUTOF10: 9
PAINLEVEL_OUTOF10: 0
PAINLEVEL_OUTOF10: 10
PAINLEVEL_OUTOF10: 3
PAINLEVEL_OUTOF10: 0
PAINLEVEL_OUTOF10: 0
PAINLEVEL_OUTOF10: 9
PAINLEVEL_OUTOF10: 9

## 2020-09-25 ASSESSMENT — PAIN DESCRIPTION - FREQUENCY
FREQUENCY: CONTINUOUS
FREQUENCY: CONTINUOUS

## 2020-09-25 ASSESSMENT — PAIN DESCRIPTION - PROGRESSION
CLINICAL_PROGRESSION: NOT CHANGED

## 2020-09-25 ASSESSMENT — PAIN DESCRIPTION - PAIN TYPE
TYPE: ACUTE PAIN

## 2020-09-25 ASSESSMENT — PAIN DESCRIPTION - DESCRIPTORS
DESCRIPTORS: ACHING;CONSTANT
DESCRIPTORS: ACHING;CONSTANT

## 2020-09-25 ASSESSMENT — PAIN DESCRIPTION - LOCATION
LOCATION: BACK
LOCATION: BACK
LOCATION: HEAD

## 2020-09-25 ASSESSMENT — PAIN DESCRIPTION - ORIENTATION
ORIENTATION: POSTERIOR;LOWER
ORIENTATION: POSTERIOR;LOWER

## 2020-09-25 ASSESSMENT — PAIN DESCRIPTION - ONSET
ONSET: ON-GOING
ONSET: ON-GOING

## 2020-09-25 NOTE — ED NOTES
Writer called to room. Patient resting comfortably in the stretcher. Patient requesting water and crackers. Patient given saltine crackers and water per request. Patient states she urinated in the stretcher. Patient was assisted into standing position while bedding was changed. Patient urinated all over the floor while standing. Patient and floor were cleaned. Patient assisted back into bed. Purewick external catheter okayed per Dr. Delon Rushing so that was placed and attached to suction. Patient feels warm at this time. Temperature taken - patient is febrile.  Will give tylenol     ST. MORIAH RODRIGUEZ RN  09/25/20 1858

## 2020-09-25 NOTE — ED NOTES
Patient presents to ED for evaluation of headache and fever. Patient who is  delivered  1 week ago via vaginal delivery with no complications. Patient had epidural at that time. Patient reports intermittent headache since the epidural that has worsened over the past few days. Patient reports developing fever yesterday. Patient has been taking tylenol and motrin with last dose of each around 1700 today. Patient reports normal amount of vaginal bleeding that her partner thinks has a foul smell. Patient denies chest pain, shortness of breath, cough, nausea, vomiting, diarrhea.      Dorothy Reynoso RN  20 [id number]

## 2020-09-25 NOTE — PROGRESS NOTES
Pharmacy Aminoglycoside Consult    Aminoglycoside: gentamicin  Day: 2  Current Dosing: Received single dose of gentamicin 5mg/kg (374mg) 9/24 at 2100    Temp max: 102    Estimated Creatinine Clearance: 185 mL/min (A) (based on SCr of 0.44 mg/dL (L)). Estimated CrCl using Ideal Body Weight: >120 mL/min (based on IBW 52.4 kg)    Recent Labs     09/24/20  1950   CREATININE 0.44*       Recent Labs     09/24/20  1950 09/25/20  0814   WBC 10.7 9.5       Culture Date      Source                   Results  9/24/20                blood                      no growth to date    Gent leve: < 0.3 mcg/ml drawn ~ 12 hrs post dose    ASSESSMENT/PLAN:     Undetectable level is desired for high dose once daily regimen. Continue current dosing of 5mg/kg q24. Will continue to monitor clinical status and watch renal function closely. [id number] WESTLEY RODRIGUEZ, BCPS  9/25/2020  12:11 PM

## 2020-09-25 NOTE — H&P
OB/GYN H&P  [facility]     Patient Name: David Mccarthy                                Patient : 1990  Room/Bed:   Admission Date/Time: 2020  6:33 PM  Primary Care Physician: DENNY Tucker CNP       Reason for Admission: Postpartum endometritis     CC:       Chief Complaint   Patient presents with    Headache    Fever                 HPI: David Mccarthy is a 30 y.o. female  PPD#7 s/p  presents to the ED, c/o fever/chills and back pain x1 day. She is breast feeding, reports bilateral tenderness, denies redness, warmth or puss drainage. She denies heavy vaginal bleeding. Patient reports a foul odor to vaginal bleeding. Denies urinary complaints. Patient denies any other concerns or complaints.  Patient's last menstrual period was 2019.      REVIEW OF SYSTEMS:   A minimum of an eleven point review of systems was completed.     Constitutional: positive fever, positive chills  HEENT: negative visual disturbances, negative headaches  Respiratory: negative dyspnea, negative cough  Cardiovascular: negative chest pain,  negative palpitations  Gastrointestinal: positive abdominal pain, negative RUQ pain, negative N/V, negative diarrhea, negative constipation  Genitourinary: negative dysuria, negative vaginal discharge, positive vaginal bleeding  Dermatological: negative rash, negative wounds  Hematologic: negative bleeding/clotting disorder  Immunologic: negative recent illness, negative recent sick contact, positive allergic reactions  Lymphatic: negative lymph nodes  Musculoskeletal: positive back pain,negative myalgias, negative arthralgias  Neurological:  negative dizziness, negative weakness  Behavior/Psych: negative depression, negative anxiety  _______________________________________________________________________        OBSTETRICAL HISTORY:                     OB History    Para Term  AB Living   3 1 1 0 1 1   SAB TAB Ectopic Molar Multiple Live Births    1 0 0 0 0 1       # Outcome Date GA Lbr Jaylen/2nd Weight Sex Delivery Anes PTL Lv   3 Current                     2 Term 11/06/18 38w0d   6 lb 13 oz (3.09 kg) M Vag-Spont EPI N GLADYS      Birth Comments: Nitrous & Epidural      Name: [place] Batman: Porfirio  Apgar5: 9   1 SAB 06/2017                        PAST MEDICAL HISTORY:   has a past medical history of Anxiety, Depression, H/O borderline personality disorder, Head trauma in child, Herpes simplex virus (HSV) infection, PTSD (post-traumatic stress disorder), Social anxiety disorder, and Trauma.     PAST SURGICAL HISTORY:   has a past surgical history that includes Colposcopy and [place] tooth extraction.     ALLERGIES:        Allergies as of 09/24/2020 - Review Complete 09/24/2020   Allergen Reaction Noted    Other Rash 01/07/2020        MEDICATIONS:  Current Facility-Administered Medications             Current Facility-Administered Medications   Medication Dose Route Frequency Provider Last Rate Last Dose    clindamycin (CLEOCIN) 900 mg in dextrose 5 % 50 mL IVPB  900 mg Intravenous Q8H Ezequiel Gramajo MD 50 mL/hr at 09/24/20 2243 900 mg at 09/24/20 2243             Current Outpatient Medications   Medication Sig Dispense Refill    ibuprofen (ADVIL;MOTRIN) 600 MG tablet Take 1 tablet by mouth every 6 hours as needed for Pain 40 tablet 0    docusate sodium (COLACE) 100 MG capsule Take 1 capsule by mouth 2 times daily as needed for Constipation 60 capsule 0    valACYclovir (VALTREX) 500 MG tablet Take 1 tablet by mouth 2 times daily 60 tablet 1    acetaminophen (TYLENOL) 500 MG tablet Take 500 mg by mouth every 6 hours as needed for Pain        calcium carbonate (TUMS E-X 750) 750 MG chewable tablet Take 2 tablets by mouth daily Indications: \"heartburn\"        ondansetron (ZOFRAN) 4 MG tablet Take 1 tablet by mouth every 8 hours as needed for Nausea or Vomiting 15 tablet 0    phenazopyridine (PYRIDIUM) 100 MG tablet Take 1 tablet by mouth 3 times daily as needed for Pain 10 tablet 0    Prenatal Vit-Fe Fumarate-FA (PRENATAL VITAMIN PO) Take 1 capsule by mouth daily               FAMILY HISTORY:  Family History of Breast, Ovarian, Colon or Uterine Cancer: No   family history includes Alcohol Abuse in her father; Alzheimer's Disease in her maternal grandfather; Anxiety Disorder in her maternal grandmother; Depression in her father; Diabetes in her brother; Diabetes type 2  in her maternal grandmother; Drug Abuse in her father; Heart Disease in her paternal grandfather, paternal grandmother, and another family member; Heart Failure in her maternal grandfather; No Known Problems in her sister;  Labor in her mother; Mark Ambrosia Abortions in her maternal grandmother; Thyroid Disease in her mother.     SOCIAL HISTORY:   reports that she has quit smoking. Her smoking use included cigarettes. She has never used smokeless tobacco. She reports that she does not drink alcohol or use drugs.     ________________________________________________________________________                                    Lauren Garcia:  Vitals       Vitals:     20 1847   BP: 129/75   Pulse: 130   Resp: 16   Temp: 100.8 °F (38.2 °C)   TempSrc: Temporal   SpO2: 98%   Weight: 165 lb (74.8 kg)   Height: 5' 3\" (1.6 m)                                                        INPUT/OUTPUT:  No intake/output data recorded. In: 2250   Out: -                                                                                                                                PHYSICAL EXAM:     General Appearance: Appears uncomfortable. Visibly shaking with chills  Skin: Skin color, texture, turgor normal. No rashes or lesions. Lymphatic: No abnormally enlarged lymph nodes. Neck and EENT: normal atraumatic, no neck masses, normal thyroid  Respiratory: Normal expansion. Clear to auscultation. No rales, rhonchi, wheezing.   Cardiovascular: normal, regular rate and rhythm, Bilirubin Urine NEGATIVE NEGATIVE     Ketones, Urine LARGE (A) NEGATIVE     Specific Gravity, UA 1.018 1.005 - 1.030     Urine Hgb LARGE (A) NEGATIVE     pH, UA 6.5 5.0 - 8.0     Protein, UA 1+ (A) NEGATIVE     Urobilinogen, Urine Normal Normal     Nitrite, Urine NEGATIVE NEGATIVE     Leukocyte Esterase, Urine SMALL (A) NEGATIVE     -            WBC, UA 20 TO 50 0 - 5 /HPF     RBC, UA TOO NUMEROUS TO COUNT 0 - 4 /HPF     Casts UA   0 - 8 /LPF       0 TO 2 HYALINE Reference range defined for non-centrifuged specimen.     Crystals, UA NOT REPORTED None /HPF     Epithelial Cells UA 2 TO 5 0 - 5 /HPF     Renal Epithelial, UA NOT REPORTED 0 /HPF     Bacteria, UA NOT REPORTED None     Mucus, UA NOT REPORTED None     Trichomonas, UA NOT REPORTED None     Amorphous, UA NOT REPORTED None     Other Observations UA NOT REPORTED NOT REQ.     Yeast, UA NOT REPORTED None   CBC WITH AUTO DIFFERENTIAL   Result Value Ref Range     WBC 10.7 3.5 - 11.3 k/uL     RBC 4.01 3.95 - 5.11 m/uL     Hemoglobin 9.6 (L) 11.9 - 15.1 g/dL     Hematocrit 32.8 (L) 36.3 - 47.1 %     MCV 81.8 (L) 82.6 - 102.9 fL     MCH 23.9 (L) 25.2 - 33.5 pg     MCHC 29.3 28.4 - 34.8 g/dL     RDW 16.8 (H) 11.8 - 14.4 %     Platelets 173 776 - 667 k/uL     MPV 9.4 8.1 - 13.5 fL     NRBC Automated 0.0 0.0 per 100 WBC     Differential Type NOT REPORTED       Seg Neutrophils 82 (H) 36 - 65 %     Lymphocytes 11 (L) 24 - 43 %     Monocytes 6 3 - 12 %     Eosinophils % 1 1 - 4 %     Basophils 0 0 - 2 %     Immature Granulocytes 0 0 %     Segs Absolute 8.74 (H) 1.50 - 8.10 k/uL     Absolute Lymph # 1.14 1.10 - 3.70 k/uL     Absolute Mono # 0.65 0.10 - 1.20 k/uL     Absolute Eos # 0.13 0.00 - 0.44 k/uL     Basophils Absolute 0.03 0.00 - 0.20 k/uL     Absolute Immature Granulocyte 0.04 0.00 - 0.30 k/uL     WBC Morphology NOT REPORTED       RBC Morphology ANISOCYTOSIS PRESENT       Platelet Estimate NOT REPORTED     COMPREHENSIVE METABOLIC PANEL   Result Value Ref Range     Glucose 80 70 - 99 mg/dL     BUN 11 6 - 20 mg/dL     CREATININE 0.44 (L) 0.50 - 0.90 mg/dL     Bun/Cre Ratio NOT REPORTED 9 - 20     Calcium 8.6 8.6 - 10.4 mg/dL     Sodium 141 135 - 144 mmol/L     Potassium 3.9 3.7 - 5.3 mmol/L     Chloride 106 98 - 107 mmol/L     CO2 21 20 - 31 mmol/L     Anion Gap 14 9 - 17 mmol/L     Alkaline Phosphatase 135 (H) 35 - 104 U/L     ALT 20 5 - 33 U/L     AST 18 <32 U/L     Total Bilirubin 0.38 0.3 - 1.2 mg/dL     Total Protein 6.8 6.4 - 8.3 g/dL     Alb 3.6 3.5 - 5.2 g/dL     Albumin/Globulin Ratio 1.1 1.0 - 2.5     GFR Non-African American >60 >60 mL/min     GFR African American >60 >60 mL/min     GFR Comment            GFR Staging NOT REPORTED     PROTIME-INR   Result Value Ref Range     Protime 9.4 9.0 - 12.0 sec     INR 0.9     APTT   Result Value Ref Range     PTT 25.6 20.5 - 30.5 sec   LACTIC ACID, WHOLE BLOOD   Result Value Ref Range     Lactic Acid, Whole Blood 2.6 (H) 0.7 - 2.1 mmol/L   COVID-19     Specimen: Other   Result Value Ref Range     SARS-CoV-2            SARS-CoV-2, Rapid Not Detected Not Detected     Source . NASOPHARYNGEAL SWAB       SARS-CoV-2          Brain Natriuretic Peptide   Result Value Ref Range     Pro-BNP 44 <300 pg/mL     BNP Interpretation Pro-BNP Reference Range:     Troponin   Result Value Ref Range     Troponin, High Sensitivity <6 0 - 14 ng/L     Troponin T NOT REPORTED <0.03 ng/mL     Troponin Interp NOT REPORTED     Lactic Acid, Whole Blood   Result Value Ref Range     Lactic Acid, Whole Blood 1.2 0.7 - 2.1 mmol/L           DIAGNOSTICS:     No results found.     ASSESSMENT & PLAN:     Newton Martin is a 30 y.o. female  PPD #7 s/p      Postpartum endometritis              - Patient c/o fever, chills, back pain, foul smelling lochia              - Temp in .8              - Tachycardic, remainder VSS              - SSE: normal appearing cervix without lesions or discharge, dark red blood noted in vaginal vault              - GC/C collected              - Vaginitis negative              - UA low suspicion for UTI              - PT/PTT wnl              - Blood culture pending              - Urine culture pending              - LA 2.6              - CXR: cardiac silhouette borderline prominent and mild central vascular congestion               - COVID neg               - Plan to admit and begin treatment with Clindamycin/Gentamicin     Breastfeeding              - c/o breast tenderness              - breast exam wnl, no erythema noted   - will order breast pump    Hx Depression/Anxiety              - mood stable              - no meds              - denies SI/HI     BMI 29.2              Patient Active Problem List     Diagnosis Date Noted    Postpartum endometritis 2020     21 F APG9,9 Wt7-4 2020    Anxiety      Herpes genitalis in women 2018       · suppressive therapy at 36 weeks       History of depression 2018       · In therapy, does not desires medications  · EPDS _           Plan discussed with Dr. Jus Cevallos, who is agreeable.      Attending's Name: Dr. Sujey Phillips DO  Ob/Gyn Resident  Pager: [phone]  Verónica Cobb  2020, 11:02 PM

## 2020-09-25 NOTE — CARE COORDINATION
Case Management Initial Discharge Plan  Aditya,             Met with:patient to discuss discharge plans. Information verified: address, contacts, phone number, , insurance Yes    Emergency Contact/Next of Kin name & number: Gray vargas [phone]    PCP: DENNY Walker CNP  Date of last visit: 2020    Insurance Provider: [place]    Discharge Planning    Living Arrangements:  Spouse/Significant Other, Children   Support Systems:  Spouse/Significant Other, Children, Family Members    Home has 2 stories  6 stairs to climb to get into front door, 15stairs to climb to reach second floor  Location of bedroom/bathroom in home upper    Patient able to perform ADL's:Independent    Current Services (outpatient & in home) none  DME equipment: none  DME provider:     Receiving oral anticoagulation therapy? No    If indicated:   Physician managing anticoagulation treatment: n/a  Where does patient obtain lab work for ATC treatment? n/a      Potential Assistance Needed:  N/A    Patient agreeable to home care: No  [place] of choice provided:  n/a    Prior SNF/Rehab Placement and Facility: none  Agreeable to SNF/Rehab: No  [place] of choice provided: n/a     Evaluation: no    Expected Discharge date:       Patient expects to be discharged to:  home  Follow Up Appointment: Best Day/ Time:      Transportation provider: family  Transportation arrangements needed for discharge: No    Readmission Risk              Risk of Unplanned Readmission:        6             Does patient have a readmission risk score greater than 14?: No  If yes, follow-up appointment must be made within 7 days of discharge.      Goals of Care: get better and get home to baby and kids      Discharge Plan: Home independently,pcp established, no needs          Electronically signed by Alvarez Burnette RN on 20 at 12:22 PM EDT

## 2020-09-25 NOTE — DISCHARGE SUMMARY
tablet  Commonly known as:  Zofran  Take 1 tablet by mouth every 8 hours as needed for Nausea or Vomiting     phenazopyridine 100 MG tablet  Commonly known as:  Pyridium  Take 1 tablet by mouth 3 times daily as needed for Pain     PRENATAL VITAMIN PO     Tums E-X 750 750 MG chewable tablet  Generic drug:  calcium carbonate     valACYclovir 500 MG tablet  Commonly known as:  Valtrex  Take 1 tablet by mouth 2 times daily           Where to Get Your Medications      You can get these medications from any pharmacy    Bring a paper prescription for each of these medications  · ciprofloxacin 250 MG tablet  · metroNIDAZOLE 500 MG tablet           Activity: pelvic rest x 6 weeks, no lifting greater than 15 lbs  Diet: regular diet  Follow up: 2 weeks     Condition on discharge: stable    Discharge date: 9/26/20    Mariangel Blackman DO  Ob/Gyn Resident    Comments:  Home care and follow-up care were reviewed. Pelvic rest, and birth control were reviewed. Signs and symptoms of mastitis and post partum depression were reviewed. The patient is to notify her physician if any of these occur. The patient was counseled on secondary smoke risks and the increased risk of sudden infant death syndrome and respiratory problems to her baby with exposure. She was counseled on various alternate recommendations to decrease the exposure to secondary smoke to her children.

## 2020-09-25 NOTE — LACTATION NOTE
Mom sleeping, awaken by writer for pump assist.  Noted that breast pump and all supplies are unopened in room. Mom know to writer from delivery and mom had used the same breast pump then. Mom reports that she has just been too tired, just let the breasts leak because she needed the larger flanges. Supplied 36 mm flanges for mom and set pump up for her. Mom does know how to use the pump. Mom reports she is giving the baby EBM at home and using an [employer] \"but the flanges aren't there right size\". Reminded mom that she can order those on line and she replied that she did but they never came. RN  [address] will supply basin with ice for mom to place EBM in, encouraged mom to have someone come and  milk. Mom has larger bottles and labels for milk,  Medication list reviewed, all meds compatible with breastfeeding.  Reassured mom no need to \"pump and dump\"

## 2020-09-25 NOTE — PROGRESS NOTES
Called 7c to have lactation specialist come show pt how to use breast pump UMESH Pierce stated she would let her know

## 2020-09-25 NOTE — PROGRESS NOTES
OB/GYN Progress Note  POST PARTUM DAY # 8    Ally Lopez is a 30 y.o. female  This patient was seen & examined today. Her pregnancy was complicated by:   Patient Active Problem List   Diagnosis    Herpes genitalis in women    History of depression    Anxiety     21 F APG9,9 Wt7-4    Postpartum endometritis       Patient is resting comfortably in her bed. Her lochia is light. She states she is somewhat improving and that her chills occur on and off. She denies chest pain, shortness of breath, lightheadedness and blurred vision. She admits to a headache and is requesting Tylenol. She is  breast feeding and she reports improved breast tenderness. Her voiding pattern is normal. I reviewed signs and symptoms of post partum depression with the patient, she currently denies any of these symptoms. Vital Signs:  Vitals:    20 2246 20 0042 20 0122 20 0132   BP: 128/76      Pulse: 129  135 133   Resp: 27  28 (!) 35   Temp:  102.3 °F (39.1 °C)     TempSrc:  Oral     SpO2: 97%  97% 96%   Weight:       Height:             Physical Exam:  General:  no apparent distress, alert and cooperative  Neurologic:  alert, oriented, normal speech, no focal findings or movement disorder noted  Lungs:  No increased work of breathing, good air exchange, clear to auscultation bilaterally, no crackles or wheezing  Heart:  regular rate and rhythm    Abdomen: abdomen soft, non-distended, non-tender  Fundus: non-tender, normal size, firm, below umbilicus  Extremities:  no calf tenderness, non edematous    Lab:  Lab Results   Component Value Date    HGB 9.6 (L) 2020     Lab Results   Component Value Date    HCT 32.8 (L) 2020       Assessment/Plan:  1.  Ally Lopez is a P6T7802 PPD # 8 s/p  complicated by PP endometritis   - Doing better   - Tachycardia stable   - Fever (last temp 100.3 @ 0228/Tmax 102.3 @ 0042)    - IV Clindamycin/gentamicin   - LA 2.6>1.2   - WBC 10.7   - GC/C

## 2020-09-25 NOTE — ED PROVIDER NOTES
[facility] [facility]  Emergency Department  Emergency Medicine Resident Sign-out     Care of Blanca Fitzpatrick was assumed from Dr. Grover Hobbs and is being seen for Headache and Fever  . The patient's initial evaluation and plan have been discussed with the prior provider who initially evaluated the patient.      EMERGENCY DEPARTMENT COURSE / MEDICAL DECISION MAKING:       MEDICATIONS GIVEN:  Orders Placed This Encounter   Medications    lactated ringers bolus    lactated ringers bolus    DISCONTD: piperacillin-tazobactam (ZOSYN) 3.375 g in dextrose 5 % 50 mL IVPB (mini-bag)     Order Specific Question:   Antimicrobial Indications     Answer:   OB/Gyn Infection    DISCONTD: vancomycin (VANCOCIN) 1000 mg in dextrose 5% 200 mL IVPB     Order Specific Question:   Antimicrobial Indications     Answer:   Sepsis of Unknown Etiology    gentamicin (GARAMYCIN) 374 mg in dextrose 5 % 250 mL IVPB     Order Specific Question:   Antimicrobial Indications     Answer:   OB/Gyn Infection    clindamycin (CLEOCIN) 900 mg in dextrose 5 % 50 mL IVPB     Order Specific Question:   Antimicrobial Indications     Answer:   OB/Gyn Infection       LABS / RADIOLOGY:     Labs Reviewed   URINALYSIS WITH MICROSCOPIC - Abnormal; Notable for the following components:       Result Value    Color, UA ORANGE (*)     Turbidity UA CLOUDY (*)     Ketones, Urine LARGE (*)     Urine Hgb LARGE (*)     Protein, UA 1+ (*)     Leukocyte Esterase, Urine SMALL (*)     All other components within normal limits   CBC WITH AUTO DIFFERENTIAL - Abnormal; Notable for the following components:    Hemoglobin 9.6 (*)     Hematocrit 32.8 (*)     MCV 81.8 (*)     MCH 23.9 (*)     RDW 16.8 (*)     Seg Neutrophils 82 (*)     Lymphocytes 11 (*)     Segs Absolute 8.74 (*)     All other components within normal limits   COMPREHENSIVE METABOLIC PANEL - Abnormal; Notable for the following components:    CREATININE 0.44 (*)     Alkaline Phosphatase 135 (*)     All other components within normal limits   LACTIC ACID, WHOLE BLOOD - Abnormal; Notable for the following components:    Lactic Acid, Whole Blood 2.6 (*)     All other components within normal limits   VAGINITIS DNA PROBE   CULTURE, URINE   CULTURE, BLOOD 1   CULTURE, BLOOD 1   C.TRACHOMATIS N.GONORRHOEAE DNA   PROTIME-INR   APTT   COVID-19   GENTAMICIN LEVEL, RANDOM   BRAIN NATRIURETIC PEPTIDE   TROPONIN   LACTIC ACID, WHOLE BLOOD   PREVIOUS SPECIMEN       Xr Chest Portable    Result Date: 9/24/2020  EXAMINATION: ONE XRAY VIEW OF THE CHEST 9/24/2020 8:17 pm COMPARISON: None. HISTORY: ORDERING SYSTEM PROVIDED HISTORY: 1 week post-partum sob TECHNOLOGIST PROVIDED HISTORY: 1 week post-partum sob Reason for Exam: 1 week post-partum; SOB Acuity: Acute Type of Exam: Initial FINDINGS: Single portable frontal view of the chest is submitted for review. The cardiac silhouette is borderline prominent. Mild central vascular congestion without focal airspace consolidation, sizeable pleural effusion, or pneumothorax. Trachea is midline. Visualized osseous structures and soft tissues are grossly intact. Cardiac silhouette is borderline prominent and there is mild central vascular congestion. No focal airspace consolidation, sizeable pleural effusion or pneumothorax. RECENT VITALS:     Temp: 100.8 °F (38.2 °C),  Pulse: 130, Resp: 16, BP: 129/75, SpO2: 98 %    This patient is a 30 y.o. Female with 1 week postpartum from vaginal delivery with no complications. She is been having since then 24 hours of headache and back pain with increased vaginal bleeding with abnormal smell. Self-reported temperature of 102 at home and took Tylenol ibuprofen at home. Brought to the ER the temp 100.8 °F.  Septic work-up revealed endometritis and OB is consulted. Patient using gent and clindamycin along with 2 L of fluid due to elevated lactic acid.   Given history of epidural analgesia during labor, suspect this more spinal headache than meningitis as she has no meningeal signs on exam.  Due to enlarged cardiac silhouette on PA chest x-ray, pro proBNP and troponin were added on. Admitted to OB. Troponin and bnp were negative. OUTSTANDING TASKS / RECOMMENDATIONS:    1. Follow troponin and proBNP  2. Await transfer to the floor. FINAL IMPRESSION:     1. Septicemia ([facility] [address]    2. Endometritis        DISPOSITION:         DISPOSITION:  []  Discharge   []  Transfer -    [x]  Admission -  OB   []  Against Medical Advice   []  Eloped   FOLLOW-UP: No follow-up provider specified.    DISCHARGE MEDICATIONS: New Prescriptions    No medications on file           Evelyn Evans DO  Emergency Medicine Resident  [address] [place]  Resident  09/25/20 [id number]

## 2020-09-25 NOTE — ED PROVIDER NOTES
Faculty Sign-Out Attestation  Handoff taken on the following patient from prior Attending Physician: Mary Kevin    I was available and discussed any additional care issues that arose and coordinated the management plans with the resident(s) caring for the patient during my duty period. Any areas of disagreement with residents documentation of care or procedures are noted on the chart. I was personally present for the key portions of any/all procedures during my duty period. I have documented in the chart those procedures where I was not present during the key portions.     Endometritis, septic, ob on as primary, abx, admitted    Hedy Aguirre DO  Attending Physician     Hedy Aguirre DO  09/25/20 [id number]

## 2020-09-25 NOTE — ED PROVIDER NOTES
[facility]     Emergency Department     Faculty Attestation    I performed a history and physical examination of the patient and discussed management with the resident. I reviewed the residents note and agree with the documented findings including all diagnostic interpretations and plan of care. Any areas of disagreement are noted on the chart. I was personally present for the key portions of any procedures. I have documented in the chart those procedures where I was not present during the key portions. I have reviewed the emergency nurses triage note. I agree with the chief complaint, past medical history, past surgical history, allergies, medications, social and family history as documented unless otherwise noted below. Documentation of the HPI, Physical Exam and Medical Decision Making performed by scribes is based on my personal performance of the HPI, PE and MDM. For Physician Assistant/ Nurse Practitioner cases/documentation I have personally evaluated this patient and have completed at least one if not all key elements of the E/M (history, physical exam, and MDM). Additional findings are as noted. This patient was evaluated in the Emergency Department for symptoms described in the history of present illness. He/she was evaluated in the context of the global COVID-19 pandemic, which necessitated consideration that the patient might be at risk for infection with the SARS-CoV-2 virus that causes COVID-19. Institutional protocols and algorithms that pertain to the evaluation of patients at risk for COVID-19 are in a state of rapid change based on information released by regulatory bodies including the CDC and federal and state organizations. These policies and algorithms were followed during the patient's care in the ED. Primary Care Physician: DENNY Goncalves - CNP    History:  This is a 30 y.o. female who presents to the Emergency Department with complaint of chills fever generally not feeling well. 1 week postpartum vaginal delivery. Worsening over the past 2 days. No pain burning with urination no cough. Physical:     height is 5' 3\" (1.6 m) and weight is 165 lb (74.8 kg). Her temporal temperature is 100.8 °F (38.2 °C). Her blood pressure is 129/75 and her pulse is 130. Her respiration is 16 and oxygen saturation is 98%.    30 y.o. female ill-appearing, Reiger's noted, cardiac exam significantly tachycardic, regular, pulmonary clear bilaterally abdomen is soft nontender nondistended, pelvic exam deferred to obstetrical team given recent delivery    Impression: Concern for sepsis    Plan: Labs fluids antibiotics, OB/GYN consultation, admission        CRITICAL CARE: There was a high probability of clinically significant/life threatening deterioration in this patient's condition which required my urgent intervention. Total critical care time was 22 minutes. This excludes any time for separately reportable procedures.      Rosa Jacobo MD, [facility]  Attending Emergency Physician        Donta St MD  09/24/20 2026

## 2020-09-26 VITALS
WEIGHT: 165 LBS | RESPIRATION RATE: 16 BRPM | HEART RATE: 96 BPM | OXYGEN SATURATION: 97 % | SYSTOLIC BLOOD PRESSURE: 118 MMHG | TEMPERATURE: 99.5 F | BODY MASS INDEX: 29.23 KG/M2 | HEIGHT: 63 IN | DIASTOLIC BLOOD PRESSURE: 64 MMHG

## 2020-09-26 PROCEDURE — 1800000000 HC LEAVE OF ABSENCE

## 2020-09-26 PROCEDURE — 6370000000 HC RX 637 (ALT 250 FOR IP): Performed by: STUDENT IN AN ORGANIZED HEALTH CARE EDUCATION/TRAINING PROGRAM

## 2020-09-26 PROCEDURE — 2500000003 HC RX 250 WO HCPCS: Performed by: STUDENT IN AN ORGANIZED HEALTH CARE EDUCATION/TRAINING PROGRAM

## 2020-09-26 PROCEDURE — 2580000003 HC RX 258: Performed by: STUDENT IN AN ORGANIZED HEALTH CARE EDUCATION/TRAINING PROGRAM

## 2020-09-26 RX ORDER — CIPROFLOXACIN 250 MG/1
500 TABLET, FILM COATED ORAL 2 TIMES DAILY
Qty: 10 TABLET | Refills: 0 | Status: ON HOLD | OUTPATIENT
Start: 2020-09-26 | End: 2020-09-28 | Stop reason: HOSPADM

## 2020-09-26 RX ORDER — METRONIDAZOLE 500 MG/1
500 TABLET ORAL 2 TIMES DAILY
Qty: 10 TABLET | Refills: 0 | Status: ON HOLD | OUTPATIENT
Start: 2020-09-26 | End: 2020-09-28 | Stop reason: HOSPADM

## 2020-09-26 RX ADMIN — CLINDAMYCIN IN 5 PERCENT DEXTROSE 900 MG: 18 INJECTION, SOLUTION INTRAVENOUS at 12:05

## 2020-09-26 RX ADMIN — DOCUSATE SODIUM 100 MG: 100 CAPSULE, LIQUID FILLED ORAL at 09:02

## 2020-09-26 RX ADMIN — CLINDAMYCIN IN 5 PERCENT DEXTROSE 900 MG: 18 INJECTION, SOLUTION INTRAVENOUS at 05:16

## 2020-09-26 RX ADMIN — SODIUM CHLORIDE: 9 INJECTION, SOLUTION INTRAVENOUS at 05:17

## 2020-09-26 NOTE — PLAN OF CARE
Problem: Falls - Risk of:  Goal: Will remain free from falls  Description: Will remain free from falls  9/26/2020 0030 by Lion Beebe RN  Outcome: Ongoing  9/25/2020 1749 by Momo Skinner RN  Outcome: Ongoing  Goal: Absence of physical injury  Description: Absence of physical injury  9/26/2020 0030 by Lion Beebe RN  Outcome: Ongoing  9/25/2020 1749 by Momo Skinner RN  Outcome: Ongoing     Problem: Pain:  Goal: Pain level will decrease  Description: Pain level will decrease  9/26/2020 0030 by Lion Beebe RN  Outcome: Ongoing  9/25/2020 1749 by Momo Skinner RN  Outcome: Ongoing  Goal: Control of acute pain  Description: Control of acute pain  9/26/2020 0030 by Lion Beebe RN  Outcome: Ongoing  9/25/2020 1749 by Momo Skinner RN  Outcome: Ongoing  Goal: Control of chronic pain  Description: Control of chronic pain  9/26/2020 0030 by Lion Beebe RN  Outcome: Ongoing  9/25/2020 1749 by Momo Skinner RN  Outcome: Ongoing

## 2020-09-26 NOTE — PLAN OF CARE
Problem: Falls - Risk of:  Goal: Will remain free from falls  Description: Will remain free from falls  9/26/2020 1340 by Danny Latif RN  Outcome: Ongoing  9/26/2020 0030 by John Vargas RN  Outcome: Ongoing  Goal: Absence of physical injury  Description: Absence of physical injury  9/26/2020 1340 by Danny Latif RN  Outcome: Ongoing  9/26/2020 0030 by John Vargas RN  Outcome: Ongoing     Problem: Pain:  Goal: Pain level will decrease  Description: Pain level will decrease  9/26/2020 1340 by Danny Latif RN  Outcome: Ongoing  9/26/2020 0030 by John Vargas RN  Outcome: Ongoing  Goal: Control of acute pain  Description: Control of acute pain  9/26/2020 1340 by Danny Latif RN  Outcome: Ongoing  9/26/2020 0030 by John Vargas RN  Outcome: Ongoing  Goal: Control of chronic pain  Description: Control of chronic pain  9/26/2020 1340 by Danny Latif RN  Outcome: Ongoing  9/26/2020 0030 by John Vargas RN  Outcome: Ongoing

## 2020-09-26 NOTE — PROGRESS NOTES
OB/GYN Resident Interval Note    Patient seen and examined. Patient has been afebrile for 24 hours, IV gentamicin and clindamycin discontinued. Tachycardia has improved. Patient reports fevers and chills and breast tenderness have resolved. On physical exam, fundal tenderness has resolved. Patient desires discharge home. Rx for Gentamicin and Ciprofloxacin given upon discharge. Patient stable for discharge. Advised to return to hospital if fevers and chills return, if having foul smelling discharge, abdominal pain, increase in vaginal bleeding, or breast tenderness.     Rhona Marroquin DO  OB/GYN Resident  Pager #[phone]

## 2020-09-26 NOTE — PROGRESS NOTES
Pt given dc instructions . Pt verbalized understanding . Pt given script for flagyl and cipro since our pharmacy closed. Pt awaiting for ride to come pick here up.

## 2020-09-26 NOTE — PROGRESS NOTES
POSTPARTUM DAY # 9    Newton Martin is a 30 y.o. female   This patient was seen and examined today.  on 20. Her pregnancy was complicated by:   Patient Active Problem List   Diagnosis    Herpes genitalis in women    History of depression    Anxiety     20 F APG9,9 Wt7-4    Postpartum endometritis    Septicemia ([facility] [address]    Endometritis       Patient seen and evaluated. Patient resting comfortably in bed. Patient complaining of occasional pain in her vaginal and rectal area and says it sometimes feel like something is \"kicking her down there\". Patient reports she has been feeling better, but still feels feverish and occasional chills. Her lochia is light. She denies chest pain, shortness of breath, headache, lightheadedness, blurred vision and peripheral edema. She is  breast pumping and she denies any signs or symptoms of mastitis. She is ambulating well. She is voiding without difficulty. She currently denies S/S of postpartum depression. Flatus present. Bowel movement present. She is tolerating solids.     Vital Signs:  Vitals:    20 1529 20 1800 20 2115 20 0015   BP: 120/65  (!) 103/52    Pulse: 117  97    Resp: 15  14    Temp: 100.5 °F (38.1 °C) 99.5 °F (37.5 °C) 98.3 °F (36.8 °C) 98.8 °F (37.1 °C)   TempSrc: Oral Oral Tympanic Tympanic   SpO2:   97%    Weight:       Height:           Urine Input & Output last 24hrs:     Intake/Output Summary (Last 24 hours) at 2020 0451  Last data filed at 2020 1653  Gross per 24 hour   Intake 3106 ml   Output --   Net 3106 ml       Physical Exam:  General:  no apparent distress, alert and cooperative  Neurologic:  alert, oriented, normal speech, no focal findings or movement disorder noted  Lungs:  No increased work of breathing, good air exchange, clear to auscultation bilaterally, no crackles or wheezing  Heart:  regular rate and rhythm    Abdomen: abdomen soft, non-distended, non-tender  Fundus: non-tender, normal size, firm, below umbilicus  Extremities:  no calf tenderness, non edematous    Labs:  Lab Results   Component Value Date    WBC 9.5 2020    HGB 8.3 (L) 2020    HCT 29.0 (L) 2020    MCV 84.5 2020     2020       Assessment/Plan:  1. Ramez Crowder is a [id number] PPD # 9 s/p  on 3/45/03 complicated by Postpartum Endometritis    - Vital signs stable   - Patient has been febrile in the last 24 hours, last temp 100.5 @1529 on     - Tmax 102.3 @ 0042 on     - Tachycardia improving (HR 90-130s)   - WBC 10.7 > 9.5   - LA 2.6 > 1.2 > 0.8   - Blood Cx x2 NGTD x 1 day   - UCx negative    - COVID-19 negative    - Continue Gentamicin/Clindamycin IV until afebrile x 24 hours    - Pain control: Motrin/Tylenol   - Diet: general   - DVT prophylaxis: SCDs  2. Rh positive/Rubella immune  3. Breast pumping   - Patient denies s/s mastitis today    - Patient with benign breast exam on admission    4. Tachycardia (improving)    - HR 90-130s on admission   - Troponin < 6 (20)   - BNP 44 (20)   - Denies chest pain, SOB, palpitations   5. Anxiety/Depression   - Mood is stable with no medications   - Denies s/s postpartum depression   - Denies SI/HI  6. Continue post-op care. Counseling Completed:  Secondary Smoke risks and Sudden Infant Death Syndrome were reviewed with recommendations. Infant sleeping, \"back to sleep\" and avoidance of co-sleeping recommendations were reviewed. Signs and Symptoms of Post Partum Depression were reviewed. The patient is to call if any occur. Signs and symptoms of Mastitis were reviewed. The patient is to call if any occur for follow up.   Discharge instructions including pelvic rest, incision care, 15 lb weight restriction, no driving with pain medicine and office follow-up were reviewed with patient     Attending Physician: Dr. Taylor Daniel,   Ob/Gyn Resident  2020, 4:51 AM

## 2020-09-26 NOTE — PROGRESS NOTES
Pt dc to front door per wc . Pt left with all  Belongings and dc instructions.  Pt's boyfriend drove pt home

## 2020-09-27 ENCOUNTER — HOSPITAL ENCOUNTER (INPATIENT)
Age: 30
LOS: 1 days | Discharge: HOME | DRG: 561 | End: 2020-09-28
Attending: OBSTETRICS & GYNECOLOGY | Admitting: OBSTETRICS & GYNECOLOGY
Payer: COMMERCIAL

## 2020-09-27 LAB
ABSOLUTE EOS #: 0.23 K/UL (ref 0–0.4)
ABSOLUTE IMMATURE GRANULOCYTE: 0.11 K/UL (ref 0–0.3)
ABSOLUTE LYMPH #: 1.24 K/UL (ref 1–4.8)
ABSOLUTE MONO #: 0.45 K/UL (ref 0.1–0.8)
BASOPHILS # BLD: 0 % (ref 0–2)
BASOPHILS ABSOLUTE: 0 K/UL (ref 0–0.2)
DIFFERENTIAL TYPE: ABNORMAL
EOSINOPHILS RELATIVE PERCENT: 2 % (ref 1–4)
HCT VFR BLD CALC: 30.3 % (ref 36.3–47.1)
HEMOGLOBIN: 8.6 G/DL (ref 11.9–15.1)
IMMATURE GRANULOCYTES: 1 %
LACTIC ACID, WHOLE BLOOD: 0.8 MMOL/L (ref 0.7–2.1)
LYMPHOCYTES # BLD: 11 % (ref 24–44)
MCH RBC QN AUTO: 23.8 PG (ref 25.2–33.5)
MCHC RBC AUTO-ENTMCNC: 28.4 G/DL (ref 28.4–34.8)
MCV RBC AUTO: 83.9 FL (ref 82.6–102.9)
MONOCYTES # BLD: 4 % (ref 1–7)
MORPHOLOGY: ABNORMAL
NRBC AUTOMATED: 0 PER 100 WBC
PDW BLD-RTO: 17.1 % (ref 11.8–14.4)
PLATELET # BLD: 289 K/UL (ref 138–453)
PLATELET ESTIMATE: ABNORMAL
PMV BLD AUTO: 9.2 FL (ref 8.1–13.5)
RBC # BLD: 3.61 M/UL (ref 3.95–5.11)
RBC # BLD: ABNORMAL 10*6/UL
SEG NEUTROPHILS: 82 % (ref 36–66)
SEGMENTED NEUTROPHILS ABSOLUTE COUNT: 9.27 K/UL (ref 1.8–7.7)
WBC # BLD: 11.3 K/UL (ref 3.5–11.3)
WBC # BLD: ABNORMAL 10*3/UL

## 2020-09-27 PROCEDURE — 84484 ASSAY OF TROPONIN QUANT: CPT

## 2020-09-27 PROCEDURE — 85025 COMPLETE CBC W/AUTO DIFF WBC: CPT

## 2020-09-27 PROCEDURE — 36415 COLL VENOUS BLD VENIPUNCTURE: CPT

## 2020-09-27 PROCEDURE — 83605 ASSAY OF LACTIC ACID: CPT

## 2020-09-27 PROCEDURE — 1200000000 HC SEMI PRIVATE

## 2020-09-27 PROCEDURE — 83880 ASSAY OF NATRIURETIC PEPTIDE: CPT

## 2020-09-27 PROCEDURE — 99232 SBSQ HOSP IP/OBS MODERATE 35: CPT | Performed by: OBSTETRICS & GYNECOLOGY

## 2020-09-27 PROCEDURE — 6370000000 HC RX 637 (ALT 250 FOR IP): Performed by: STUDENT IN AN ORGANIZED HEALTH CARE EDUCATION/TRAINING PROGRAM

## 2020-09-27 PROCEDURE — 2580000003 HC RX 258: Performed by: STUDENT IN AN ORGANIZED HEALTH CARE EDUCATION/TRAINING PROGRAM

## 2020-09-27 PROCEDURE — 84443 ASSAY THYROID STIM HORMONE: CPT

## 2020-09-27 PROCEDURE — 93005 ELECTROCARDIOGRAM TRACING: CPT | Performed by: STUDENT IN AN ORGANIZED HEALTH CARE EDUCATION/TRAINING PROGRAM

## 2020-09-27 RX ORDER — LANOLIN 72 %
OINTMENT (GRAM) TOPICAL PRN
Status: DISCONTINUED | OUTPATIENT
Start: 2020-09-27 | End: 2020-09-28 | Stop reason: HOSPADM

## 2020-09-27 RX ORDER — DOCUSATE SODIUM 100 MG/1
100 CAPSULE, LIQUID FILLED ORAL 2 TIMES DAILY
Status: DISCONTINUED | OUTPATIENT
Start: 2020-09-27 | End: 2020-09-28 | Stop reason: HOSPADM

## 2020-09-27 RX ORDER — AMOXICILLIN 500 MG/1
500 CAPSULE ORAL EVERY 8 HOURS SCHEDULED
Status: DISCONTINUED | OUTPATIENT
Start: 2020-09-27 | End: 2020-09-28 | Stop reason: HOSPADM

## 2020-09-27 RX ORDER — IBUPROFEN 800 MG/1
800 TABLET ORAL EVERY 8 HOURS PRN
Status: DISCONTINUED | OUTPATIENT
Start: 2020-09-27 | End: 2020-09-28 | Stop reason: HOSPADM

## 2020-09-27 RX ORDER — ONDANSETRON 2 MG/ML
4 INJECTION INTRAMUSCULAR; INTRAVENOUS EVERY 4 HOURS PRN
Status: DISCONTINUED | OUTPATIENT
Start: 2020-09-27 | End: 2020-09-28 | Stop reason: HOSPADM

## 2020-09-27 RX ORDER — METRONIDAZOLE 500 MG/1
500 TABLET ORAL EVERY 8 HOURS SCHEDULED
Status: DISCONTINUED | OUTPATIENT
Start: 2020-09-27 | End: 2020-09-28 | Stop reason: HOSPADM

## 2020-09-27 RX ORDER — HYDROCORTISONE 25 MG/G
CREAM TOPICAL
Status: DISCONTINUED | OUTPATIENT
Start: 2020-09-27 | End: 2020-09-28 | Stop reason: HOSPADM

## 2020-09-27 RX ORDER — SODIUM CHLORIDE, SODIUM LACTATE, POTASSIUM CHLORIDE, CALCIUM CHLORIDE 600; 310; 30; 20 MG/100ML; MG/100ML; MG/100ML; MG/100ML
INJECTION, SOLUTION INTRAVENOUS CONTINUOUS
Status: DISCONTINUED | OUTPATIENT
Start: 2020-09-27 | End: 2020-09-28 | Stop reason: HOSPADM

## 2020-09-27 RX ORDER — SIMETHICONE 80 MG
80 TABLET,CHEWABLE ORAL EVERY 6 HOURS PRN
Status: DISCONTINUED | OUTPATIENT
Start: 2020-09-27 | End: 2020-09-28 | Stop reason: HOSPADM

## 2020-09-27 RX ORDER — BISACODYL 10 MG
10 SUPPOSITORY, RECTAL RECTAL DAILY PRN
Status: DISCONTINUED | OUTPATIENT
Start: 2020-09-27 | End: 2020-09-28 | Stop reason: HOSPADM

## 2020-09-27 RX ORDER — ACETAMINOPHEN 500 MG
1000 TABLET ORAL EVERY 6 HOURS PRN
Status: DISCONTINUED | OUTPATIENT
Start: 2020-09-27 | End: 2020-09-28 | Stop reason: HOSPADM

## 2020-09-27 RX ADMIN — METRONIDAZOLE 500 MG: 500 TABLET, FILM COATED ORAL at 13:56

## 2020-09-27 RX ADMIN — SODIUM CHLORIDE, POTASSIUM CHLORIDE, SODIUM LACTATE AND CALCIUM CHLORIDE: 600; 310; 30; 20 INJECTION, SOLUTION INTRAVENOUS at 10:29

## 2020-09-27 RX ADMIN — AMOXICILLIN 500 MG: 500 CAPSULE ORAL at 21:03

## 2020-09-27 RX ADMIN — AMOXICILLIN 500 MG: 500 CAPSULE ORAL at 13:56

## 2020-09-27 RX ADMIN — DOCUSATE SODIUM 100 MG: 100 CAPSULE, LIQUID FILLED ORAL at 21:03

## 2020-09-27 RX ADMIN — IBUPROFEN 800 MG: 800 TABLET, FILM COATED ORAL at 10:29

## 2020-09-27 RX ADMIN — SIMETHICONE 80 MG: 80 TABLET, CHEWABLE ORAL at 10:29

## 2020-09-27 RX ADMIN — DOCUSATE SODIUM 100 MG: 100 CAPSULE, LIQUID FILLED ORAL at 10:29

## 2020-09-27 RX ADMIN — HYDROCORTISONE 2.5%: 25 CREAM TOPICAL at 10:28

## 2020-09-27 RX ADMIN — METRONIDAZOLE 500 MG: 500 TABLET, FILM COATED ORAL at 21:03

## 2020-09-27 ASSESSMENT — PAIN SCALES - GENERAL
PAINLEVEL_OUTOF10: 4
PAINLEVEL_OUTOF10: 5
PAINLEVEL_OUTOF10: 7

## 2020-09-27 ASSESSMENT — PAIN DESCRIPTION - FREQUENCY: FREQUENCY: INTERMITTENT

## 2020-09-27 ASSESSMENT — PAIN DESCRIPTION - DESCRIPTORS: DESCRIPTORS: SHARP

## 2020-09-27 ASSESSMENT — PAIN DESCRIPTION - PAIN TYPE: TYPE: ACUTE PAIN

## 2020-09-27 ASSESSMENT — PAIN DESCRIPTION - LOCATION: LOCATION: ABDOMEN;BACK

## 2020-09-27 ASSESSMENT — PAIN DESCRIPTION - ORIENTATION: ORIENTATION: RIGHT

## 2020-09-27 NOTE — DISCHARGE SUMMARY
OB/Gyn Discharge Summary  [facility]      Patient Name: Nabila Gallego  Patient : 1990  Primary Care Physician: DENNY Calvin CNP  Admit Date: 2020    Principal Diagnosis: Postpartum Endometritis     Other Diagnosis:   Postpartum endometritis [O86.12]  Patient Active Problem List   Diagnosis    Herpes genitalis in women    History of depression    Anxiety     20 F APG9,9 Wt7-4    Postpartum endometritis       Infection: Yes  Hospital Acquired: No    Surgical Operations & Procedures:  Echocardiogram    Consultations:  Cardiology    Pertinent Findings & Procedures:   Nabila Gallego is a 30 y.o. female , PPD#10 s/p  on 20 admitted for inpatient management of postpartum endometritis. Patient was previously admitted on 20 for postpartum endometritis and received Clindamycin/Gentamicin IV until she was afebrile for 24 hours postpartum. Patient was then discharged home on 20 and then presented to [facility] ED on 20 with complaints of fever at home. Patient was found to be afebrile at [facility] ED, but was tachycardic (HR 120s) despite IVF, so decision was made to transfer patient to [facility] Med[facility]Surg for continued management of PP endometritis.  on admission. EKG showed sinus tachycardia. CXR was wnl at [facility]. Patient was started on oral Metronidazole and Amoxicillin q8h for 14 days. Patient remained afebrile. 2020: Patient complained of Chest pressure, SOB, and was tachycardic in the 120s. STAT EKG showed sinus tach, otherwise normal; CT PE was negative for PE, Trop were wnl, BNP was wnl, and TSH was normal. Cardiology was consulted and patient underwent echocardiogram which was unremarkable. She was instructed to follow up with cardiology outpatient. Hospital course was normal, discharged home on 2020. Follow up in 1 week. Discharge instructions reviewed and questions answered.     Course of patient: as detailed above    Discharge to: Home    Readmission planned: No    Recommendations on Discharge:     Medications:     Medication List      START taking these medications    amoxicillin 500 MG capsule  Commonly known as:  AMOXIL  Take 1 capsule by mouth every 8 hours for 12 days        CHANGE how you take these medications    metroNIDAZOLE 500 MG tablet  Commonly known as:  FLAGYL  Take 1 tablet by mouth every 8 hours for 12 days  What changed:  when to take this        CONTINUE taking these medications    acetaminophen 500 MG tablet  Commonly known as:  TYLENOL     docusate sodium 100 MG capsule  Commonly known as:  Colace  Take 1 capsule by mouth 2 times daily as needed for Constipation     ibuprofen 600 MG tablet  Commonly known as:  ADVIL;MOTRIN  Take 1 tablet by mouth every 6 hours as needed for Pain     ondansetron 4 MG tablet  Commonly known as:  Zofran  Take 1 tablet by mouth every 8 hours as needed for Nausea or Vomiting     phenazopyridine 100 MG tablet  Commonly known as:  Pyridium  Take 1 tablet by mouth 3 times daily as needed for Pain     PRENATAL VITAMIN PO     Tums E-X 750 750 MG chewable tablet  Generic drug:  calcium carbonate     valACYclovir 500 MG tablet  Commonly known as:  Valtrex  Take 1 tablet by mouth 2 times daily        STOP taking these medications    ciprofloxacin 250 MG tablet  Commonly known as:  CIPRO           Where to Get Your Medications      You can get these medications from any pharmacy    Bring a paper prescription for each of these medications  · amoxicillin 500 MG capsule  · metroNIDAZOLE 500 MG tablet           Activity: pelvic rest x 6 weeks, no driving on narcotics, no lifting greater than 15 lbs  Diet: regular diet  Follow up: 1 week    Condition on discharge: good and stable   Discharge Date: 9/28/2020    Comments:  Home care, Follow-up care, restrictions reviewed.     Licha Garcia DO  Ob/Gyn Resident  [facility]  9/28/2020, 4:25 PM

## 2020-09-27 NOTE — CARE COORDINATION
Case Management Initial Discharge Plan  Aditya,             Met with:patient to discuss discharge plans. Information verified: address, contacts, phone number, , insurance Yes    Emergency Contact/Next of Kin name & number: Karla Duong - mother [phone]    PCP: DENNY Carvalho CNP  Date of last visit:  Ave Provider: Sandy Benitez    Discharge Planning    Living Arrangements:  Pt resides with bfriend, their 2 children and his 2 kids   Support Systems:  Spouse/Significant Other, Friends/Neighbors, Family Members    Home has 2 stories  5 stairs to climb to get into front door, 10 stairs to climb to reach second floor  Location of bedroom/bathroom in home  - upstairs    Patient able to perform ADL's:Independent    Current Services (outpatient & in home) none  DME equipment: no  DME provider:     Receiving oral anticoagulation therapy? No    If indicated:   Physician managing anticoagulation treatment:   Where does patient obtain lab work for ATC treatment? Potential Assistance Needed:  N/A    Patient agreeable to home care: No  [place] of choice provided:  n/a    Prior SNF/Rehab Placement and Facility: No  Agreeable to SNF/Rehab: No  [place] of choice provided: n/a     Evaluation: no    Expected Discharge date:  20    Patient expects to be discharged to:  Home  Follow Up Appointment: Best Day/ Time: Tuesday PM    Transportation provider: drives (hasn't currently been driving d/t illness), bfriend  Transportation arrangements needed for discharge: No, bfriend    Readmission Risk              Risk of Unplanned Readmission:        7             Does patient have a readmission risk score greater than 14?: No  If yes, follow-up appointment must be made within 7 days of discharge.      Goals of Care:       Discharge Plan: Pt will return home  Stated her mother is on stand by and will come here from [place] if she will need more assistance at home Electronically signed by Lulu Jaramillo on 9/27/20 at 3:57 PM EDT

## 2020-09-27 NOTE — H&P
OB/GYN H&P  [address] [facility]    Patient Name: Sandy Jimenez     Patient : 1990  Room/Bed: 9003/8951-31  Admission Date/Time: 2020  8:34 AM  Primary Care Physician: DENNY Cuevas CNP        CC: Transfer from [address] [place] [address] with tachycardia and recent PP endometritis: Abdominal and back pain    HPI: Sandy Jimenez is a 30 y.o. female  PPD#10 s/p  who is admitted for PP endometritis management. She was previously admitted on  and received IV clindamycin and gentamycin. She was discharged on  after being afebrile for 24 hours. She presents as a transfer from [address] [place] [address] where she was found to be tachycardic in the 120's earlier this AM.  She was given a fluid bolus which did not resolve her symptoms. She was discharged yesterday after an admission for PP endometritis. She was given one dose of IV gent and clinda before transfer. Today she states that she went back to [address] [place] [address] because she was having fever and chills and felt her heart was faster than usual.  She had a chest xray at [address] [place] [address] which came back normal. They recommended a transvaginal ultrasound however she was transferred prior to having it performed. She does have abdominal pain and back pain. She states the abdominal pain is in her lower abdomen b/l. The back pain is in her low back. She denies any urinary   She is also complaining of a cough that started last night. It is a non productive cough. Patient's last menstrual period was 2019. REVIEW OF SYSTEMS:   A minimum of an eleven point review of systems was completed.     Constitutional: positive fever, chills chills  HEENT: negative visual disturbances, negative headaches  Respiratory: negative dyspnea, positive cough  Cardiovascular: negative chest pain,  negative palpitations  Gastrointestinal: positive abdominal pain, negative RUQ pain, negative N/V, negative diarrhea, negative constipation  Genitourinary: negative dysuria, negative vaginal discharge, positive vaginal bleeding  Dermatological: negative rash, negative wounds  Hematologic: negative bleeding/clotting disorder  Immunologic: negative recent illness, negative recent sick contact, negative allergic reactions  Lymphatic: negative lymph nodes  Musculoskeletal: positive back pain, negative myalgias, negative arthralgias  Neurological:  negative dizziness, negative weakness  Behavior/Psych: negative depression, negative anxiety  _______________________________________________________________________      OBSTETRICAL HISTORY:   OB History    Para Term  AB Living   3 1 1 0 1 1   SAB TAB Ectopic Molar Multiple Live Births   1 0 0 0 0 1      # Outcome Date GA Lbr Jaylen/2nd Weight Sex Delivery Anes PTL Lv   3 Current            2 Term 18 38w0d  6 lb 13 oz (3.09 kg) M Vag-Spont EPI N GLADYS      Birth Comments: Nitrous & Epidural      Name: Pete Bullard: [address] [place] [address] West: 9   1 SAB 2017               PAST MEDICAL HISTORY:   has a past medical history of Anxiety, Depression, H/O borderline personality disorder, Head trauma in child, Herpes simplex virus (HSV) infection, PTSD (post-traumatic stress disorder), Social anxiety disorder, and Trauma. PAST SURGICAL HISTORY:   has a past surgical history that includes Colposcopy and Post tooth extraction. ALLERGIES:  Allergies as of 2020 - Review Complete 2020   Allergen Reaction Noted    Other Rash 2020       MEDICATIONS:  No current facility-administered medications for this encounter. SOCIAL HISTORY:   reports that she has quit smoking. Her smoking use included cigarettes. She has never used smokeless tobacco. She reports that she does not drink alcohol or use drugs.             VITALS:  Vitals:    20 0830 20 0845   BP: 115/67    Pulse: 111    Resp: 18    Temp: 98.6 °F (37 °C)    TempSrc: Oral    SpO2: 98%    Weight:  163 lb (73.9 kg)   Height:  5' 3\" (1.6 m) INPUT/OUTPUT:  No intake/output data recorded. No intake/output data recorded. PHYSICAL EXAM:     General Appearance: Appears healthy. Alert; in no acute distress. Pleasant. Skin: Skin color, texture, turgor normal. No rashes or lesions. Lymphatic: No abnormally enlarged lymph nodes. Neck and EENT: normal atraumatic, no neck masses, normal thyroid, no jvd  Respiratory: Normal expansion. Clear to auscultation. Cardiovascular: normal, regular rate and rhythm  Breast: Breasts appear normal, symmetrical, no suspicious masses, no skin or nipple changes. No axillary nodes, no nipple discharge Small area of light erythema over medial left breast, breasts tender overall ( patient states they have been since delivery and breast pumping)  Abdomen: soft, tender in b/l lower abdomen, no fundal tenderness, non-distended, no right upper quadrant tenderness and no CVA tenderness,  rebound, guarding, or rigidity  Rectal Exam: not indicated  Musculoskeletal: no gross abnormalities  Extremities: non-tender BLE and non-edematous  Psych:  oriented to time, place and person       LAB RESULTS:  No results found for this visit on 09/27/20. No results found for this or any previous visit (from the past 24 hour(s)). DIAGNOSTICS:  Xr Chest Portable    Result Date: 9/24/2020  EXAMINATION: ONE XRAY VIEW OF THE CHEST 9/24/2020 8:17 pm COMPARISON: None. HISTORY: ORDERING SYSTEM PROVIDED HISTORY: 1 week post-partum sob TECHNOLOGIST PROVIDED HISTORY: 1 week post-partum sob Reason for Exam: 1 week post-partum; SOB Acuity: Acute Type of Exam: Initial FINDINGS: Single portable frontal view of the chest is submitted for review. The cardiac silhouette is borderline prominent.   Mild central vascular congestion without focal airspace consolidation, sizeable pleural effusion, or pneumothorax. Trachea is midline. Visualized osseous structures and soft tissues are grossly intact. Cardiac silhouette is borderline prominent and there is mild central vascular congestion. No focal airspace consolidation, sizeable pleural effusion or pneumothorax. ASSESSMENT & PLAN:    Shawn Pitts is a 30 y.o. female  PPD #10 s/p     Postpartum Endometritis   - Recently discharged after being admitted for PP endometritis   - Was treated with IV Clinda and Gent and discharged yesterday after being afebrile for 24 hr   - During admission blood cx, urine cx were negative   - Seen at [address] [place] [address] earlier this AM and was tachycardic- received IV fluids and one dose of IV gen/clinda prior to transfer   - Had labs and chest xray at [facility] which came back wnl   - Currently she is afebrile and complaining of lower abdominal and back pain   - Tachycardic on admission in 111, afebrile   - Exam: tender in b/l abdomen, breast exam benign, small area of erythema of left medial breast   - ATSO Dr. Susu Barnhart   - IVR LR @ 125 ml/hr   - Motrin and tylenol ordered for pain   - Lactic acid and CBC ordered   - General diet   - Plan follow up with labs, IV fluids and continue PO abx Amoxicillin and Flagyl 500 mg q8    Breastfeeding   - Denies s/s of mastitis    Hx Depression/Anxiety   - Moods stable   - Denies HI/SI   - No meds    BMI 28.87    Patient Active Problem List    Diagnosis Date Noted    Postpartum endometritis 2020     20 F APG9,9 Wt7-4 2020    Anxiety     Herpes genitalis in women 2018     · suppressive therapy at 36 weeks      History of depression 2018     · In therapy, does not desires medications  · EPDS  / 7_         Plan discussed with Dr. Susu Barnhart, who is agreeable.      Attending's Name: Dr. Ross Barnett DO  Ob/Gyn Resident    [address]  2020, 9:23 AM

## 2020-09-27 NOTE — PLAN OF CARE
Problem: Pain:  Goal: Pain level will decrease  Description: Pain level will decrease  Outcome: Ongoing  Goal: Control of acute pain  Description: Control of acute pain  Outcome: Ongoing     Problem: Daily Care:  Goal: Daily care needs are met  Description: Daily care needs are met  Outcome: Ongoing     Problem: Discharge Planning:  Goal: Patients continuum of care needs are met  Description: Patients continuum of care needs are met  Outcome: Ongoing

## 2020-09-28 ENCOUNTER — APPOINTMENT (OUTPATIENT)
Dept: CT IMAGING | Age: 30
DRG: 561 | End: 2020-09-28
Attending: OBSTETRICS & GYNECOLOGY
Payer: COMMERCIAL

## 2020-09-28 VITALS
BODY MASS INDEX: 28.88 KG/M2 | HEIGHT: 63 IN | SYSTOLIC BLOOD PRESSURE: 120 MMHG | TEMPERATURE: 98.1 F | RESPIRATION RATE: 16 BRPM | DIASTOLIC BLOOD PRESSURE: 77 MMHG | OXYGEN SATURATION: 98 % | WEIGHT: 163 LBS | HEART RATE: 90 BPM

## 2020-09-28 LAB
BNP INTERPRETATION: NORMAL
EKG ATRIAL RATE: 106 BPM
EKG ATRIAL RATE: 109 BPM
EKG P AXIS: 32 DEGREES
EKG P AXIS: 36 DEGREES
EKG P-R INTERVAL: 140 MS
EKG P-R INTERVAL: 142 MS
EKG Q-T INTERVAL: 326 MS
EKG Q-T INTERVAL: 330 MS
EKG QRS DURATION: 80 MS
EKG QRS DURATION: 80 MS
EKG QTC CALCULATION (BAZETT): 433 MS
EKG QTC CALCULATION (BAZETT): 444 MS
EKG R AXIS: 38 DEGREES
EKG R AXIS: 51 DEGREES
EKG T AXIS: 50 DEGREES
EKG T AXIS: 50 DEGREES
EKG VENTRICULAR RATE: 106 BPM
EKG VENTRICULAR RATE: 109 BPM
LV EF: 55 %
LVEF MODALITY: NORMAL
PRO-BNP: 132 PG/ML
TROPONIN INTERP: NORMAL
TROPONIN INTERP: NORMAL
TROPONIN T: NORMAL NG/ML
TROPONIN T: NORMAL NG/ML
TROPONIN, HIGH SENSITIVITY: <6 NG/L (ref 0–14)
TROPONIN, HIGH SENSITIVITY: <6 NG/L (ref 0–14)
TSH SERPL DL<=0.05 MIU/L-ACNC: 0.75 MIU/L (ref 0.3–5)

## 2020-09-28 PROCEDURE — 93306 TTE W/DOPPLER COMPLETE: CPT

## 2020-09-28 PROCEDURE — 6370000000 HC RX 637 (ALT 250 FOR IP): Performed by: STUDENT IN AN ORGANIZED HEALTH CARE EDUCATION/TRAINING PROGRAM

## 2020-09-28 PROCEDURE — 93005 ELECTROCARDIOGRAM TRACING: CPT | Performed by: STUDENT IN AN ORGANIZED HEALTH CARE EDUCATION/TRAINING PROGRAM

## 2020-09-28 PROCEDURE — 84484 ASSAY OF TROPONIN QUANT: CPT

## 2020-09-28 PROCEDURE — 36415 COLL VENOUS BLD VENIPUNCTURE: CPT

## 2020-09-28 PROCEDURE — 71260 CT THORAX DX C+: CPT

## 2020-09-28 PROCEDURE — 93010 ELECTROCARDIOGRAM REPORT: CPT | Performed by: INTERNAL MEDICINE

## 2020-09-28 PROCEDURE — 6360000004 HC RX CONTRAST MEDICATION: Performed by: STUDENT IN AN ORGANIZED HEALTH CARE EDUCATION/TRAINING PROGRAM

## 2020-09-28 PROCEDURE — 2580000003 HC RX 258: Performed by: STUDENT IN AN ORGANIZED HEALTH CARE EDUCATION/TRAINING PROGRAM

## 2020-09-28 RX ORDER — LANOLIN ALCOHOL/MO/W.PET/CERES
325 CREAM (GRAM) TOPICAL
Status: DISCONTINUED | OUTPATIENT
Start: 2020-09-29 | End: 2020-09-28 | Stop reason: HOSPADM

## 2020-09-28 RX ORDER — METRONIDAZOLE 500 MG/1
500 TABLET ORAL EVERY 8 HOURS SCHEDULED
Qty: 36 TABLET | Refills: 0 | Status: SHIPPED | OUTPATIENT
Start: 2020-09-28 | End: 2020-10-10

## 2020-09-28 RX ORDER — POTASSIUM CHLORIDE 20 MEQ/1
20 TABLET, EXTENDED RELEASE ORAL ONCE
Status: COMPLETED | OUTPATIENT
Start: 2020-09-28 | End: 2020-09-28

## 2020-09-28 RX ORDER — AMOXICILLIN 500 MG/1
500 CAPSULE ORAL EVERY 8 HOURS SCHEDULED
Qty: 36 CAPSULE | Refills: 0 | Status: SHIPPED | OUTPATIENT
Start: 2020-09-28 | End: 2020-10-10

## 2020-09-28 RX ADMIN — DOCUSATE SODIUM 100 MG: 100 CAPSULE, LIQUID FILLED ORAL at 09:02

## 2020-09-28 RX ADMIN — POTASSIUM CHLORIDE 20 MEQ: 1500 TABLET, EXTENDED RELEASE ORAL at 16:32

## 2020-09-28 RX ADMIN — AMOXICILLIN 500 MG: 500 CAPSULE ORAL at 17:29

## 2020-09-28 RX ADMIN — METRONIDAZOLE 500 MG: 500 TABLET, FILM COATED ORAL at 05:42

## 2020-09-28 RX ADMIN — IOPAMIDOL 75 ML: 755 INJECTION, SOLUTION INTRAVENOUS at 02:35

## 2020-09-28 RX ADMIN — AMOXICILLIN 500 MG: 500 CAPSULE ORAL at 05:42

## 2020-09-28 RX ADMIN — ACETAMINOPHEN 1000 MG: 500 TABLET ORAL at 00:15

## 2020-09-28 RX ADMIN — METRONIDAZOLE 500 MG: 500 TABLET, FILM COATED ORAL at 16:32

## 2020-09-28 RX ADMIN — SODIUM CHLORIDE, POTASSIUM CHLORIDE, SODIUM LACTATE AND CALCIUM CHLORIDE: 600; 310; 30; 20 INJECTION, SOLUTION INTRAVENOUS at 04:56

## 2020-09-28 RX ADMIN — IBUPROFEN 800 MG: 800 TABLET, FILM COATED ORAL at 05:01

## 2020-09-28 ASSESSMENT — PAIN SCALES - GENERAL
PAINLEVEL_OUTOF10: 6
PAINLEVEL_OUTOF10: 6
PAINLEVEL_OUTOF10: 2
PAINLEVEL_OUTOF10: 6

## 2020-09-28 ASSESSMENT — PAIN DESCRIPTION - DESCRIPTORS: DESCRIPTORS: ACHING

## 2020-09-28 ASSESSMENT — PAIN DESCRIPTION - ORIENTATION: ORIENTATION: LOWER

## 2020-09-28 ASSESSMENT — PAIN DESCRIPTION - LOCATION: LOCATION: BACK

## 2020-09-28 ASSESSMENT — PAIN DESCRIPTION - PAIN TYPE: TYPE: ACUTE PAIN

## 2020-09-28 ASSESSMENT — PAIN DESCRIPTION - FREQUENCY: FREQUENCY: CONTINUOUS

## 2020-09-28 NOTE — PROGRESS NOTES
OB Attending    Patient seen and examined during afternoon rounds. Appreciate cardiology consult and agree tachycardia and atypical chest pain is likely multifactorial: resolving postpartum endometritis, anemia, anxiety triggered by multiple social stressors. Workup completed during this hospitalization has ruled out PE, MI, pneumonia, and echo is wnl. Patient is requesting discharge. Her vitals are normal and she states that she believes this most recent hospitalization was triggered by an argument with her stepson. She has made arrangements with her partner to reduce the tension in her household and she wants to go home to be with her baby. Discharge instructions reviewed. Will have patient follow up PP with cardiology and OB to closely monitor symptoms outpatient.

## 2020-09-28 NOTE — PLAN OF CARE
Patient discharged with all belongings. She understood all instructions but will call unit if questions arise. She was picked up by .

## 2020-09-28 NOTE — CARE COORDINATION
[facility]  Written by: Charlotte Reed RN    Patient Name: Kapil Gonzales  Attending Provider: Shivani Alvarado Date: 2020  8:34 AM  MRN: [id number]  Account: [de-identified]                     : 1990  Discharge Date: 2020      Disposition: home independently.     Charltote Reed RN

## 2020-09-28 NOTE — PLAN OF CARE
Problem: Pain:  Goal: Pain level will decrease  Description: Pain level will decrease  Outcome: Completed  Goal: Control of acute pain  Description: Control of acute pain  Outcome: Completed  Goal: Control of chronic pain  Description: Control of chronic pain  Outcome: Completed     Problem: Daily Care:  Goal: Daily care needs are met  Description: Daily care needs are met  Outcome: Completed     Problem: Discharge Planning:  Goal: Patients continuum of care needs are met  Description: Patients continuum of care needs are met  Outcome: Completed

## 2020-09-28 NOTE — CONSULTS
[facility]    Inpatient Consultation Note               Today's Date: 9/28/2020  Patient Name: Ramez Crowder  Date of admission: 9/27/2020  8:34 AM  Patient's age: 30 y. o., 1990  Admission Dx: Postpartum endometritis [O86.12]    Reason for  Consult:  Symptomatic Tachycardia     Requesting Physician: Renan George DO    CHIEF COMPLAINT:  Chest Pressure and Shortness of Breath   No chief complaint on file. History Obtained From:  patient, electronic medical record    HISTORY OF PRESENT ILLNESS:      The patient is a 30 y.o. female who is admitted to the hospital for management of postpartum endometritis when she felt sudden onset chest pressure and shortness of breath. Further investigation during that time resulted in a heart rate in the 120s which was the second tachycardic instance over the past few days. A stat EKG done indicated sinus tachycardia as well. Cardiology was consulted due to concerns over the tachycardia. Past Medical History:   has a past medical history of Anxiety, Depression, H/O borderline personality disorder, Head trauma in child, Herpes simplex virus (HSV) infection, PTSD (post-traumatic stress disorder), Social anxiety disorder, and Trauma. Past Surgical History:   has a past surgical history that includes Colposcopy and [place] tooth extraction. Home Medications:    Prior to Admission medications    Medication Sig Start Date End Date Taking?  Authorizing Provider   metroNIDAZOLE (FLAGYL) 500 MG tablet Take 1 tablet by mouth every 8 hours for 12 days 9/28/20 10/10/20 Yes Roslyn Castañeda DO   amoxicillin (AMOXIL) 500 MG capsule Take 1 capsule by mouth every 8 hours for 12 days 9/28/20 10/10/20 Yes Roslyn Castañeda DO   ibuprofen (ADVIL;MOTRIN) 600 MG tablet Take 1 tablet by mouth every 6 hours as needed for Pain 9/17/20  Yes Hal Burciaga,    docusate sodium (COLACE) 100 MG capsule Take 1 capsule by mouth 2 times daily as needed for Constipation 9/17/20  Yes Marco Goldberg DO   valACYclovir (VALTREX) 500 MG tablet Take 1 tablet by mouth 2 times daily 8/18/20  Yes DENNY Abbott CNM   acetaminophen (TYLENOL) 500 MG tablet Take 500 mg by mouth every 6 hours as needed for Pain   Yes Historical Provider, MD   calcium carbonate (TUMS E-X 750) 750 MG chewable tablet Take 2 tablets by mouth daily Indications: \"heartburn\"   Yes Historical Provider, MD   ondansetron (ZOFRAN) 4 MG tablet Take 1 tablet by mouth every 8 hours as needed for Nausea or Vomiting 3/26/20  Yes Adrienne Abbott [place]   Prenatal Vit-Fe Fumarate-FA (PRENATAL VITAMIN PO) Take 1 capsule by mouth daily   Yes Historical Provider, MD   phenazopyridine (PYRIDIUM) 100 MG tablet Take 1 tablet by mouth 3 times daily as needed for Pain 2/28/20 2/27/21  DENNY Briseno CNM      Current Facility-Administered Medications: acetaminophen (TYLENOL) tablet 1,000 mg, 1,000 mg, Oral, Q6H PRN  ibuprofen (ADVIL;MOTRIN) tablet 800 mg, 800 mg, Oral, Q8H PRN  simethicone (MYLICON) chewable tablet 80 mg, 80 mg, Oral, Q6H PRN  docusate sodium (COLACE) capsule 100 mg, 100 mg, Oral, BID  magnesium hydroxide (MILK OF MAGNESIA) 400 MG/5ML suspension 30 mL, 30 mL, Oral, Daily PRN  bisacodyl (DULCOLAX) suppository 10 mg, 10 mg, Rectal, Daily PRN  ondansetron (ZOFRAN) injection 4 mg, 4 mg, Intravenous, Q4H PRN  Lanolin Hydrous OINT, , Topical, PRN  witch hazel-glycerin (TUCKS) pad, , Topical, PRN  hydrocortisone (ANUSOL-HC) 2.5 % rectal cream, , Topical, Q2H PRN  benzocaine-menthol (DERMOPLAST) 20-0.5 % spray, , Topical, PRN  lactated ringers infusion, , Intravenous, Continuous  metroNIDAZOLE (FLAGYL) tablet 500 mg, 500 mg, Oral, 3 times per day  amoxicillin (AMOXIL) capsule 500 mg, 500 mg, Oral, 3 times per day    Allergies: Other    Social History:   reports that she has quit smoking. Her smoking use included cigarettes.  She has never used smokeless tobacco. She reports that she does not drink alcohol or use drugs. Family History: family history includes Alcohol Abuse in her father; Alzheimer's Disease in her maternal grandfather; Anxiety Disorder in her maternal grandmother; Depression in her father; Diabetes in her brother; Diabetes type 2  in her maternal grandmother; Drug Abuse in her father; Heart Disease in her paternal grandfather, paternal grandmother, and another family member; Heart Failure in her maternal grandfather; No Known Problems in her sister;  Labor in her mother; Evorn Eriksson Abortions in her maternal grandmother; Thyroid Disease in her mother. REVIEW OF SYSTEMS:      · Constitutional: there has been no unanticipated weight loss. · Cardiovascular:  Chest Pressure improved with pain management   · Respiratory: No cough  · Gastrointestinal: No abdominal pain. · Genitourinary: No change in bowel or bladder habits. · Musculoskeletal: No joint complaints. · Neurological: No headache  · Hematologic/Lymphatic: No abnormal bruising or bleeding    PHYSICAL EXAM:      /71   Pulse 86   Temp 98.1 °F (36.7 °C) (Oral)   Resp 16   Ht 5' 3\" (1.6 m)   Wt 163 lb (73.9 kg)   LMP 2019   SpO2 97%   BMI 28.87 kg/m²      Intake/Output Summary (Last 24 hours) at 2020 09  Last data filed at 2020  Gross per 24 hour   Intake 1500 ml   Output --   Net 1500 ml     Constitutional and General Appearance:    Alert, cooperative, no distress and appears stated age  Respiratory:  · No for increased work of breathing. · Clear to auscultation bilaterally  Cardiovascular:  · Regular S1 and S2. Abdomen:   · Soft and Nontender   · Bowel sounds present  Extremities:  ·  No Cyanosis or Clubbing  ·  No lower extremity edema  Neurological:  · Alert and oriented.   · Moves all extremities well    DATA:    Diagnostics:    EK2020  Sinus Tachycardia     Labs:     CBC:   Recent Labs     20  1246   WBC 11.3   HGB 8.6*   HCT 30.3*        Pro-BNP: Recent Labs     20  1246   PROBNP 132       Recent Labs     20  1246 20  0806   TROPONINT NOT REPORTED NOT REPORTED     FASTING LIPID PANEL:  Lab Results   Component Value Date    HDL 53 2020    TRIG 72 2020     LIVER PROFILE:No results for input(s): AST, ALT, LABALBU in the last 72 hours. Patient's Active Problem List  Active Problems:    Herpes genitalis in women    History of depression    Anxiety     21 F APG9,9 Wt7-4    Postpartum endometritis  Resolved Problems:    * No resolved hospital problems. *      IMPRESSION:      Sinus Tachycardia  Patient reports no sensations as if heart is racing   Ekg from 2020 showing sinus tachycardia  HR 86 and RR 16 this am  /71 this am   Will continue to monitor    Atypical/Pleuritic Type Chest Pain  Pain not reproducible on palpation but present with deep breaths  CT PE negative  Chest x-ray showing mild central vascular congestion  Pro-BNP negative (132)  Troponin negative (<6 x 3)    Atypical Chest Pain Secondary to Anxiety  Patient reports always being in a state of anxiety  Management per primary    Borderline Hypokalemia   K+ 3.9 this am  Management per primary  Please keep K+ > 4    RECOMMENDATIONS:  1. Echo Today    Further recommendations after discussion with Dr. Delmy Amato     Thank you for allowing us to participate in the care of [facility] and [facility]. If you have any questions or concerns, please do not hesitate to contact us. Jo Rice M.D. Resident PGY-1  [address] [facility]        Please note that part of this chart were generated using voice recognition  dictation software. Although every effort was made to ensure the accuracy of this automated transcription, some errors in transcription may have occurred.         Attestation signed by      Attending Physician Statement:    I have discussed the care of  [facility] and the [place] , including pertinent history and exam findings, with the Cardiology fellow/resident. I have seen and examined the patient and the key elements of all parts of the encounter have been performed by me. I agree with the assessment, plan and orders as documented by the fellow/resident, after I modified exam findings and plan of treatments, and the final version is my approved version of the assessment. Additional Comments:   Tachycardia , multifactorial   Rule out infection , she ia anemic ,anxiety , ?  Post partum CM   Plan to start Tele , echo   As BP on lower side watch the HR avoid BB now   Dr. Betsy De La Torre

## 2020-09-28 NOTE — PROGRESS NOTES
Gynecology Progress Note    Date: 2020  Time: 12:22 AM    Mario Gar is a 30 y.o. female  HD# 2    Patient was seen and examined. She complained of racing heart and chest pressure. Patient states, \"it feels like something is sitting on my chest.\" Reports chest pressure has been present since yesterday when she presented to [facility]. Reports SOB and non-productive cough. Reports occasional orthopnea. Last temp 100.1F and HR 120s. Pain is  controlled. Patient is  tolerating oral intake. Reports nausea, no vomiting. She is urinating well. She reports light lochia. She is breast pumping and denies s/s of mastitis. She is  ambulating without difficulty. She is  passing flatus. Vitals:  Vitals:    20 0845 20 1843 20 1941 20 0007   BP:  116/70 (!) 104/48 122/60   Pulse:  102 107 120   Resp:  18 16 18   Temp:  98.3 °F (36.8 °C) 98.5 °F (36.9 °C) 100.1 °F (37.8 °C)   TempSrc:  Oral Oral Oral   SpO2:  100% 99% 98%   Weight: 163 lb (73.9 kg)      Height: 5' 3\" (1.6 m)            Intake/Output:   Last Shift: I/O last 3 completed shifts: In: 1500 [I.V.:1500]  Out: -   Current Shift: No intake/output data recorded.     Physical Exam:  General:  no apparent distress, alert and cooperative  Neurologic:  alert, oriented, normal speech, no focal findings or movement disorder noted  Lungs:  No increased work of breathing,decreased air exchange bilaterally, clear to auscultation bilaterally, no crackles or wheezing  Heart:  Tachycardic, regular rhythm and no murmur    Abdomen: Abdomen soft, mild tenderness to moderate palpation, BS normal. No masses,  No organomegaly  Musculoskeletal: Chest pain reproducible with palpation   Extremities:  no calf tenderness, non edematous    Lab:  Complete Blood Count:   Recent Labs     20  0814 20  1246   WBC 9.5 11.3   HGB 8.3* 8.6*   HCT 29.0* 30.3*    289        PT/INR:    Lab Results   Component Value Date    PROTIME 9. 2020    INR 0.9 2020     PTT:    Lab Results   Component Value Date    APTT 25.6 2020       Comprehensive Metabolic Profile:   No results for input(s): NA, K, CL, CO2, BUN, CREATININE, GLUCOSE, CALCIUM, PROT, LABALBU, BILITOT, ALKPHOS, AST, ALT in the last 72 hours. Cultures:  blood cultures: shows no growth  Ucx negative     Assessment/Plan:  [facility] the [facility] 30 y.o. female  HD# 2 admitted for postpartum endometritis    - Temp increasing, but afebrile at this time.  HR 120s.    - Continue IVF    - S/p Gent/Clinda IV x24h    - Continue PO Amoxicillin/Flagyl x14 days    - Blood Cx NTD    - UCx negative    - COVID negative    - Motrin/Tylenol for pain    - General diet    - Continue care, please page with any questions    Chest pressure, SOB, Tachycardia    - STAT EKG, CT PE, BNP, Trop x2 and TSH ordered    - Chest pressure reproducible with palpation    - Decreased lung sounds, but no wheezing, crackles, or rhonchi    - O2 sats 98% on RA   - EKG : Sinus tach   - CXR  wnl @ [facility]     Breast pumping    - Denies s/s of mastitis       Patient Active Problem List    Diagnosis Date Noted    Postpartum endometritis 2020     21 F APG9,9 Wt7-4 2020    Anxiety     Herpes genitalis in women 2018     Overview Note:     · suppressive therapy at 36 weeks      History of depression 2018     Overview Note:     · In therapy, does not desires medications  · EPDS 14/ _ / 7_           Zeina Carnes, DO  Ob/Gyn Resident  Pager: [phone]  2020, 12:22 AM

## 2020-09-30 LAB
CULTURE: NORMAL
CULTURE: NORMAL
Lab: NORMAL
Lab: NORMAL
SPECIMEN DESCRIPTION: NORMAL
SPECIMEN DESCRIPTION: NORMAL

## 2020-10-04 ENCOUNTER — APPOINTMENT (OUTPATIENT)
Dept: CT IMAGING | Age: 30
DRG: 561 | End: 2020-10-04
Payer: COMMERCIAL

## 2020-10-04 ENCOUNTER — HOSPITAL ENCOUNTER (INPATIENT)
Age: 30
LOS: 2 days | Discharge: HOME | DRG: 561 | End: 2020-10-06
Attending: EMERGENCY MEDICINE | Admitting: OBSTETRICS & GYNECOLOGY
Payer: COMMERCIAL

## 2020-10-04 PROBLEM — A41.9 SEPSIS (HCC): Status: ACTIVE | Noted: 2020-10-04

## 2020-10-04 LAB
ABSOLUTE EOS #: 0.24 K/UL (ref 0–0.4)
ABSOLUTE IMMATURE GRANULOCYTE: 0 K/UL (ref 0–0.3)
ABSOLUTE LYMPH #: 3.66 K/UL (ref 1–4.8)
ABSOLUTE MONO #: 0.49 K/UL (ref 0.1–0.8)
ALBUMIN SERPL-MCNC: 3.6 G/DL (ref 3.5–5.2)
ALBUMIN/GLOBULIN RATIO: 1.2 (ref 1–2.5)
ALP BLD-CCNC: 100 U/L (ref 35–104)
ALT SERPL-CCNC: 13 U/L (ref 5–33)
ANION GAP SERPL CALCULATED.3IONS-SCNC: 12 MMOL/L (ref 9–17)
AST SERPL-CCNC: 15 U/L
BASOPHILS # BLD: 0 % (ref 0–2)
BASOPHILS ABSOLUTE: 0 K/UL (ref 0–0.2)
BILIRUB SERPL-MCNC: 0.31 MG/DL (ref 0.3–1.2)
BILIRUBIN DIRECT: <0.08 MG/DL
BILIRUBIN, INDIRECT: NORMAL MG/DL (ref 0–1)
BUN BLDV-MCNC: 11 MG/DL (ref 6–20)
BUN/CREAT BLD: ABNORMAL (ref 9–20)
CALCIUM SERPL-MCNC: 8.2 MG/DL (ref 8.6–10.4)
CHLORIDE BLD-SCNC: 102 MMOL/L (ref 98–107)
CO2: 21 MMOL/L (ref 20–31)
CREAT SERPL-MCNC: 0.51 MG/DL (ref 0.5–0.9)
CULTURE: NORMAL
DIFFERENTIAL TYPE: ABNORMAL
DIRECT EXAM: NORMAL
EOSINOPHILS RELATIVE PERCENT: 1 % (ref 1–4)
GFR AFRICAN AMERICAN: >60 ML/MIN
GFR NON-AFRICAN AMERICAN: >60 ML/MIN
GFR SERPL CREATININE-BSD FRML MDRD: ABNORMAL ML/MIN/{1.73_M2}
GFR SERPL CREATININE-BSD FRML MDRD: ABNORMAL ML/MIN/{1.73_M2}
GLOBULIN: NORMAL G/DL (ref 1.5–3.8)
GLUCOSE BLD-MCNC: 112 MG/DL (ref 70–99)
HCT VFR BLD CALC: 33.3 % (ref 36.3–47.1)
HEMOGLOBIN: 9.6 G/DL (ref 11.9–15.1)
IMMATURE GRANULOCYTES: 0 %
INR BLD: 1
LYMPHOCYTES # BLD: 15 % (ref 24–44)
Lab: NORMAL
Lab: NORMAL
MCH RBC QN AUTO: 23.1 PG (ref 25.2–33.5)
MCHC RBC AUTO-ENTMCNC: 28.8 G/DL (ref 28.4–34.8)
MCV RBC AUTO: 80.2 FL (ref 82.6–102.9)
MONOCYTES # BLD: 2 % (ref 1–7)
MORPHOLOGY: ABNORMAL
MORPHOLOGY: ABNORMAL
NRBC AUTOMATED: 0 PER 100 WBC
PARTIAL THROMBOPLASTIN TIME: 26.6 SEC (ref 20.5–30.5)
PDW BLD-RTO: 17.5 % (ref 11.8–14.4)
PLATELET # BLD: 479 K/UL (ref 138–453)
PLATELET ESTIMATE: ABNORMAL
PMV BLD AUTO: 9.4 FL (ref 8.1–13.5)
POTASSIUM SERPL-SCNC: 3.3 MMOL/L (ref 3.7–5.3)
PROTHROMBIN TIME: 10.9 SEC (ref 9–12)
RBC # BLD: 4.15 M/UL (ref 3.95–5.11)
RBC # BLD: ABNORMAL 10*6/UL
SEG NEUTROPHILS: 82 % (ref 36–66)
SEGMENTED NEUTROPHILS ABSOLUTE COUNT: 20.01 K/UL (ref 1.8–7.7)
SODIUM BLD-SCNC: 135 MMOL/L (ref 135–144)
SPECIMEN DESCRIPTION: NORMAL
SPECIMEN DESCRIPTION: NORMAL
TOTAL PROTEIN: 6.7 G/DL (ref 6.4–8.3)
WBC # BLD: 24.4 K/UL (ref 3.5–11.3)
WBC # BLD: ABNORMAL 10*3/UL

## 2020-10-04 PROCEDURE — 85025 COMPLETE CBC W/AUTO DIFF WBC: CPT

## 2020-10-04 PROCEDURE — 85730 THROMBOPLASTIN TIME PARTIAL: CPT

## 2020-10-04 PROCEDURE — 87075 CULTR BACTERIA EXCEPT BLOOD: CPT

## 2020-10-04 PROCEDURE — 6360000002 HC RX W HCPCS: Performed by: STUDENT IN AN ORGANIZED HEALTH CARE EDUCATION/TRAINING PROGRAM

## 2020-10-04 PROCEDURE — 87591 N.GONORRHOEAE DNA AMP PROB: CPT

## 2020-10-04 PROCEDURE — 96365 THER/PROPH/DIAG IV INF INIT: CPT

## 2020-10-04 PROCEDURE — 99284 EMERGENCY DEPT VISIT MOD MDM: CPT

## 2020-10-04 PROCEDURE — 96366 THER/PROPH/DIAG IV INF ADDON: CPT

## 2020-10-04 PROCEDURE — 85610 PROTHROMBIN TIME: CPT

## 2020-10-04 PROCEDURE — 87660 TRICHOMONAS VAGIN DIR PROBE: CPT

## 2020-10-04 PROCEDURE — 87205 SMEAR GRAM STAIN: CPT

## 2020-10-04 PROCEDURE — 6360000004 HC RX CONTRAST MEDICATION: Performed by: STUDENT IN AN ORGANIZED HEALTH CARE EDUCATION/TRAINING PROGRAM

## 2020-10-04 PROCEDURE — 96368 THER/DIAG CONCURRENT INF: CPT

## 2020-10-04 PROCEDURE — 2580000003 HC RX 258: Performed by: STUDENT IN AN ORGANIZED HEALTH CARE EDUCATION/TRAINING PROGRAM

## 2020-10-04 PROCEDURE — 87070 CULTURE OTHR SPECIMN AEROBIC: CPT

## 2020-10-04 PROCEDURE — 87510 GARDNER VAG DNA DIR PROBE: CPT

## 2020-10-04 PROCEDURE — 6370000000 HC RX 637 (ALT 250 FOR IP): Performed by: STUDENT IN AN ORGANIZED HEALTH CARE EDUCATION/TRAINING PROGRAM

## 2020-10-04 PROCEDURE — 83605 ASSAY OF LACTIC ACID: CPT

## 2020-10-04 PROCEDURE — 80076 HEPATIC FUNCTION PANEL: CPT

## 2020-10-04 PROCEDURE — 74177 CT ABD & PELVIS W/CONTRAST: CPT

## 2020-10-04 PROCEDURE — 86403 PARTICLE AGGLUT ANTBDY SCRN: CPT

## 2020-10-04 PROCEDURE — 1200000000 HC SEMI PRIVATE

## 2020-10-04 PROCEDURE — 87040 BLOOD CULTURE FOR BACTERIA: CPT

## 2020-10-04 PROCEDURE — 87480 CANDIDA DNA DIR PROBE: CPT

## 2020-10-04 PROCEDURE — 87086 URINE CULTURE/COLONY COUNT: CPT

## 2020-10-04 PROCEDURE — 81001 URINALYSIS AUTO W/SCOPE: CPT

## 2020-10-04 PROCEDURE — 80048 BASIC METABOLIC PNL TOTAL CA: CPT

## 2020-10-04 PROCEDURE — 87491 CHLMYD TRACH DNA AMP PROBE: CPT

## 2020-10-04 PROCEDURE — 2060000000 HC ICU INTERMEDIATE R&B

## 2020-10-04 RX ORDER — POTASSIUM CHLORIDE 20 MEQ/1
40 TABLET, EXTENDED RELEASE ORAL ONCE
Status: COMPLETED | OUTPATIENT
Start: 2020-10-04 | End: 2020-10-04

## 2020-10-04 RX ORDER — SODIUM CHLORIDE, SODIUM LACTATE, POTASSIUM CHLORIDE, AND CALCIUM CHLORIDE .6; .31; .03; .02 G/100ML; G/100ML; G/100ML; G/100ML
30 INJECTION, SOLUTION INTRAVENOUS ONCE
Status: COMPLETED | OUTPATIENT
Start: 2020-10-04 | End: 2020-10-05

## 2020-10-04 RX ADMIN — SODIUM CHLORIDE, POTASSIUM CHLORIDE, SODIUM LACTATE AND CALCIUM CHLORIDE 2178 ML: 600; 310; 30; 20 INJECTION, SOLUTION INTRAVENOUS at 21:57

## 2020-10-04 RX ADMIN — POTASSIUM CHLORIDE 40 MEQ: 1500 TABLET, EXTENDED RELEASE ORAL at 22:43

## 2020-10-04 RX ADMIN — MEROPENEM 1 G: 1 INJECTION, POWDER, FOR SOLUTION INTRAVENOUS at 21:57

## 2020-10-04 RX ADMIN — IOPAMIDOL 75 ML: 755 INJECTION, SOLUTION INTRAVENOUS at 22:09

## 2020-10-04 ASSESSMENT — ENCOUNTER SYMPTOMS
ABDOMINAL PAIN: 0
BACK PAIN: 0
VOMITING: 0
SHORTNESS OF BREATH: 0
COUGH: 0
NAUSEA: 0
SORE THROAT: 0

## 2020-10-04 ASSESSMENT — PAIN DESCRIPTION - FREQUENCY: FREQUENCY: CONTINUOUS

## 2020-10-04 ASSESSMENT — PAIN SCALES - GENERAL: PAINLEVEL_OUTOF10: 10

## 2020-10-04 ASSESSMENT — PAIN DESCRIPTION - DESCRIPTORS: DESCRIPTORS: ACHING;THROBBING;BURNING

## 2020-10-04 ASSESSMENT — PAIN DESCRIPTION - PAIN TYPE: TYPE: ACUTE PAIN

## 2020-10-05 ENCOUNTER — APPOINTMENT (OUTPATIENT)
Dept: GENERAL RADIOLOGY | Age: 30
DRG: 561 | End: 2020-10-05
Payer: COMMERCIAL

## 2020-10-05 LAB
-: ABNORMAL
ABSOLUTE EOS #: 0.37 K/UL (ref 0–0.44)
ABSOLUTE IMMATURE GRANULOCYTE: 0.06 K/UL (ref 0–0.3)
ABSOLUTE LYMPH #: 2.42 K/UL (ref 1.1–3.7)
ABSOLUTE MONO #: 0.57 K/UL (ref 0.1–1.2)
AMORPHOUS: ABNORMAL
ANION GAP SERPL CALCULATED.3IONS-SCNC: 10 MMOL/L (ref 9–17)
BACTERIA: ABNORMAL
BASOPHILS # BLD: 0 % (ref 0–2)
BASOPHILS ABSOLUTE: 0.06 K/UL (ref 0–0.2)
BILIRUBIN URINE: NEGATIVE
BUN BLDV-MCNC: 5 MG/DL (ref 6–20)
BUN/CREAT BLD: ABNORMAL (ref 9–20)
CALCIUM SERPL-MCNC: 8.6 MG/DL (ref 8.6–10.4)
CASTS UA: ABNORMAL /LPF (ref 0–8)
CHLORIDE BLD-SCNC: 107 MMOL/L (ref 98–107)
CO2: 21 MMOL/L (ref 20–31)
COLOR: YELLOW
CREAT SERPL-MCNC: 0.48 MG/DL (ref 0.5–0.9)
CRYSTALS, UA: ABNORMAL /HPF
DIFFERENTIAL TYPE: ABNORMAL
EOSINOPHILS RELATIVE PERCENT: 3 % (ref 1–4)
EPITHELIAL CELLS UA: ABNORMAL /HPF (ref 0–5)
GFR AFRICAN AMERICAN: >60 ML/MIN
GFR NON-AFRICAN AMERICAN: >60 ML/MIN
GFR SERPL CREATININE-BSD FRML MDRD: ABNORMAL ML/MIN/{1.73_M2}
GFR SERPL CREATININE-BSD FRML MDRD: ABNORMAL ML/MIN/{1.73_M2}
GLUCOSE BLD-MCNC: 84 MG/DL (ref 70–99)
GLUCOSE URINE: NEGATIVE
HCT VFR BLD CALC: 30.9 % (ref 36.3–47.1)
HEMOGLOBIN: 9 G/DL (ref 11.9–15.1)
IMMATURE GRANULOCYTES: 0 %
KETONES, URINE: NEGATIVE
LACTIC ACID, SEPSIS WHOLE BLOOD: 0.7 MMOL/L (ref 0.5–1.9)
LACTIC ACID, SEPSIS: NORMAL MMOL/L (ref 0.5–1.9)
LEUKOCYTE ESTERASE, URINE: ABNORMAL
LYMPHOCYTES # BLD: 16 % (ref 24–43)
MCH RBC QN AUTO: 23.9 PG (ref 25.2–33.5)
MCHC RBC AUTO-ENTMCNC: 29.1 G/DL (ref 28.4–34.8)
MCV RBC AUTO: 82 FL (ref 82.6–102.9)
MONOCYTES # BLD: 4 % (ref 3–12)
MUCUS: ABNORMAL
NITRITE, URINE: NEGATIVE
NRBC AUTOMATED: 0 PER 100 WBC
OTHER OBSERVATIONS UA: ABNORMAL
PDW BLD-RTO: 18 % (ref 11.8–14.4)
PH UA: 6.5 (ref 5–8)
PLATELET # BLD: 399 K/UL (ref 138–453)
PLATELET ESTIMATE: ABNORMAL
PMV BLD AUTO: 9 FL (ref 8.1–13.5)
POTASSIUM SERPL-SCNC: 3.9 MMOL/L (ref 3.7–5.3)
PROTEIN UA: NEGATIVE
RBC # BLD: 3.77 M/UL (ref 3.95–5.11)
RBC # BLD: ABNORMAL 10*6/UL
RBC UA: ABNORMAL /HPF (ref 0–4)
RENAL EPITHELIAL, UA: ABNORMAL /HPF
SARS-COV-2, RAPID: NORMAL
SARS-COV-2: NORMAL
SARS-COV-2: NOT DETECTED
SEG NEUTROPHILS: 77 % (ref 36–65)
SEGMENTED NEUTROPHILS ABSOLUTE COUNT: 11.38 K/UL (ref 1.5–8.1)
SODIUM BLD-SCNC: 138 MMOL/L (ref 135–144)
SOURCE: NORMAL
SPECIFIC GRAVITY UA: 1.05 (ref 1–1.03)
TRICHOMONAS: ABNORMAL
TURBIDITY: CLEAR
URINE HGB: ABNORMAL
UROBILINOGEN, URINE: NORMAL
WBC # BLD: 14.9 K/UL (ref 3.5–11.3)
WBC # BLD: ABNORMAL 10*3/UL
WBC UA: ABNORMAL /HPF (ref 0–5)
YEAST: ABNORMAL

## 2020-10-05 PROCEDURE — 36415 COLL VENOUS BLD VENIPUNCTURE: CPT

## 2020-10-05 PROCEDURE — U0003 INFECTIOUS AGENT DETECTION BY NUCLEIC ACID (DNA OR RNA); SEVERE ACUTE RESPIRATORY SYNDROME CORONAVIRUS 2 (SARS-COV-2) (CORONAVIRUS DISEASE [COVID-19]), AMPLIFIED PROBE TECHNIQUE, MAKING USE OF HIGH THROUGHPUT TECHNOLOGIES AS DESCRIBED BY CMS-2020-01-R: HCPCS

## 2020-10-05 PROCEDURE — 6370000000 HC RX 637 (ALT 250 FOR IP): Performed by: STUDENT IN AN ORGANIZED HEALTH CARE EDUCATION/TRAINING PROGRAM

## 2020-10-05 PROCEDURE — 2060000000 HC ICU INTERMEDIATE R&B

## 2020-10-05 PROCEDURE — 2580000003 HC RX 258: Performed by: STUDENT IN AN ORGANIZED HEALTH CARE EDUCATION/TRAINING PROGRAM

## 2020-10-05 PROCEDURE — 71046 X-RAY EXAM CHEST 2 VIEWS: CPT

## 2020-10-05 PROCEDURE — 99232 SBSQ HOSP IP/OBS MODERATE 35: CPT | Performed by: OBSTETRICS & GYNECOLOGY

## 2020-10-05 PROCEDURE — 85025 COMPLETE CBC W/AUTO DIFF WBC: CPT

## 2020-10-05 PROCEDURE — 6360000002 HC RX W HCPCS: Performed by: STUDENT IN AN ORGANIZED HEALTH CARE EDUCATION/TRAINING PROGRAM

## 2020-10-05 PROCEDURE — 80048 BASIC METABOLIC PNL TOTAL CA: CPT

## 2020-10-05 RX ORDER — SODIUM CHLORIDE 9 MG/ML
INJECTION, SOLUTION INTRAVENOUS CONTINUOUS
Status: DISCONTINUED | OUTPATIENT
Start: 2020-10-05 | End: 2020-10-06 | Stop reason: HOSPADM

## 2020-10-05 RX ORDER — DOCUSATE SODIUM 100 MG/1
100 CAPSULE, LIQUID FILLED ORAL DAILY
Status: DISCONTINUED | OUTPATIENT
Start: 2020-10-05 | End: 2020-10-06 | Stop reason: HOSPADM

## 2020-10-05 RX ORDER — LANOLIN ALCOHOL/MO/W.PET/CERES
325 CREAM (GRAM) TOPICAL
Status: DISCONTINUED | OUTPATIENT
Start: 2020-10-05 | End: 2020-10-06 | Stop reason: HOSPADM

## 2020-10-05 RX ORDER — VITAMIN A, ASCORBIC ACID, CHOLECALCIFEROL, .ALPHA.-TOCOPHEROL ACETATE, DL-, THIAMINE MONONITRATE, RIBOFLAVIN, NIACINAMIDE, PYRIDOXINE HYDROCHLORIDE, FOLIC ACID, CYANOCOBALAMIN, CALCIUM CARBONATE, IRON, ZINC OXIDE, AND CUPRIC OXIDE 4000; 120; 400; 22; 1.84; 3; 20; 10; 1; 12; 200; 29; 25; 2 [IU]/1; MG/1; [IU]/1; [IU]/1; MG/1; MG/1; MG/1; MG/1; MG/1; UG/1; MG/1; MG/1; MG/1; MG/1
1 TABLET ORAL DAILY
Status: DISCONTINUED | OUTPATIENT
Start: 2020-10-05 | End: 2020-10-06 | Stop reason: HOSPADM

## 2020-10-05 RX ORDER — ACETAMINOPHEN 500 MG
1000 TABLET ORAL EVERY 6 HOURS PRN
Status: DISCONTINUED | OUTPATIENT
Start: 2020-10-05 | End: 2020-10-06 | Stop reason: HOSPADM

## 2020-10-05 RX ORDER — IBUPROFEN 600 MG/1
600 TABLET ORAL EVERY 6 HOURS PRN
Status: DISCONTINUED | OUTPATIENT
Start: 2020-10-05 | End: 2020-10-06 | Stop reason: HOSPADM

## 2020-10-05 RX ADMIN — ACETAMINOPHEN 1000 MG: 500 TABLET ORAL at 15:46

## 2020-10-05 RX ADMIN — Medication 1 TABLET: at 08:12

## 2020-10-05 RX ADMIN — MEROPENEM 1 G: 1 INJECTION, POWDER, FOR SOLUTION INTRAVENOUS at 04:02

## 2020-10-05 RX ADMIN — FERROUS SULFATE TAB EC 325 MG (65 MG FE EQUIVALENT) 325 MG: 325 (65 FE) TABLET DELAYED RESPONSE at 08:12

## 2020-10-05 RX ADMIN — SODIUM CHLORIDE: 9 INJECTION, SOLUTION INTRAVENOUS at 02:38

## 2020-10-05 ASSESSMENT — ENCOUNTER SYMPTOMS: TACHYPNEA: 1

## 2020-10-05 ASSESSMENT — PAIN SCALES - GENERAL: PAINLEVEL_OUTOF10: 6

## 2020-10-05 NOTE — CARE COORDINATION
Case Management Initial Discharge Plan  Aditya,             Met with:patient to discuss discharge plans. Information verified: address, contacts, phone number, , insurance Yes    Emergency Contact/Next of Kin name & number: Km Hernandez [phone]    PCP: DENNY Galloway CNP  Date of last visit: 2020    Insurance Provider: Jessica Maldonado    Discharge Planning    Living Arrangements:  Spouse/Significant Other, Children   Support Systems:  Parent, Family Members, Children, Spouse/Significant Other    Home has 2 stories with basement  4 stairs to climb to get into front door, 12stairs to climb to reach second floor  Location of bedroom/bathroom in home upstiars    Patient able to perform ADL's:Independent    Current Services (outpatient & in home) none  DME equipment: none  DME provider: n/a    Receiving oral anticoagulation therapy? No    If indicated:   Physician managing anticoagulation treatment:   Where does patient obtain lab work for ATC treatment? Potential Assistance Needed:  N/A    Patient agreeable to home care: No  [place] of choice provided:  n/a    Prior SNF/Rehab Placement and Facility: none  Agreeable to SNF/Rehab: No  [place] of choice provided: n/a     Evaluation: n/a    Expected Discharge date:  10/05/20    Patient expects to be discharged to:  home  Follow Up Appointment: Best Day/ Time:      Transportation provider: boyfriend or grandma  Transportation arrangements needed for discharge: No    Readmission Risk              Risk of Unplanned Readmission:        9             Does patient have a readmission risk score greater than 14?: No  If yes, follow-up appointment must be made within 7 days of discharge.      Goals of Care: decreased infection, no fever      Discharge Plan: home with boyfriend and kids    Pharmacy-[facility] on [address] signed by Eh Dalal RN on 10/5/20 at 9:03 AM EDT

## 2020-10-05 NOTE — PLAN OF CARE
Problem: Physical Regulation:  Goal: Diagnostic test results will improve  Description: Diagnostic test results will improve  Outcome: Ongoing  Goal: Will remain free from infection  Description: Will remain free from infection  Outcome: Ongoing  Goal: Ability to maintain vital signs within normal range will improve  Description: Ability to maintain vital signs within normal range will improve  Outcome: Ongoing

## 2020-10-05 NOTE — ED NOTES
Pt resting on cot, RR even and unlabored, NAD, A&O, call light in reach, denies any needs  Warm blankets given     Allen Arredondo RN  10/04/20 [id number]

## 2020-10-05 NOTE — H&P
OB/GYN H&P  [facility]    Patient Name: Robert Rosenbaum     Patient : 1990  Room/Bed:   Admission Date/Time: 10/4/2020  7:45 PM  Primary Care Physician: DENNY Cesar CNP    Consulting Provider: Dr. Valarie Layton MD  Reason for Consult: Fever, Hx of PP endometritis    CC:   Chief Complaint   Patient presents with    Fever                HPI: Robert Rosenbaum is a 30 y.o. female  presents to the ED, c/o fever for the past 24 hours. OB/GYN was consulted as patient is RXC#00 from  complicated by postpartum endometritis. Patient was diagnosed with postpartum endometritis , s/p Clinda/Gent x24h. PO Amoxicillin/Flagyl q8h x14d, patient did not finish due to rash. She reports she took antibiotics for a couple of days but stopped due to an itchy rash on her chest and arms. Then after stopping the antibiotics she noticed that she had a fever at home (102.0F) starting today. She reports subjective fever and chills. Minimal vaginal bleeding. She also complains of diffuse abdominal pain that is intermittent and minimally relieved with tylenol and motrin. She reports last taking tylenol at 1600. She has not found that position changes or activity makes it better or worse. She denies any nausea or vomiting. Denies malodorous discharge. She reports she is breast pumping and is not having any s/s of mastitis. She is not using any form of contraception at this time. REVIEW OF SYSTEMS:   A minimum of an eleven point review of systems was completed.     Constitutional: positive fever, positive chills  HEENT: negative visual disturbances, negative headaches  Respiratory: negative dyspnea, negative cough  Cardiovascular: negative chest pain,  negative palpitations  Gastrointestinal: positive abdominal pain, negative RUQ pain, positive N/V, negative diarrhea, negative constipation  Genitourinary: negative dysuria, negative vaginal discharge, positive vaginal bleeding  Dermatological: negative rash, negative wounds  Hematologic: negative bleeding/clotting disorder  Immunologic: negative recent illness, negative recent sick contact, negative allergic reactions  Lymphatic: negative lymph nodes  Musculoskeletal: negative back pain, negative myalgias, negative arthralgias  Neurological:  negative dizziness, negative weakness  Behavior/Psych: negative depression, negative anxiety  _______________________________________________________________________      OBSTETRICAL HISTORY:   OB History    Para Term  AB Living   3 1 1 0 1 1   SAB TAB Ectopic Molar Multiple Live Births   1 0 0 0 0 1      # Outcome Date GA Lbr Jaylen/2nd Weight Sex Delivery Anes PTL Lv   3 Current            2 Term 18 38w0d  6 lb 13 oz (3.09 kg) M Vag-Spont EPI N GLADYS      Birth Comments: Nitrous & Epidural      Name: Laura Cali: [address] [place] [address] West: 9   1 2017               PAST MEDICAL HISTORY:   has a past medical history of Anxiety, Depression, H/O borderline personality disorder, Head trauma in child, Herpes simplex virus (HSV) infection, PTSD (post-traumatic stress disorder), Social anxiety disorder, and Trauma. PAST SURGICAL HISTORY:   has a past surgical history that includes Colposcopy and [place] tooth extraction. ALLERGIES:  Allergies as of 10/04/2020 - Review Complete 2020   Allergen Reaction Noted    Other Rash 2020       MEDICATIONS:  No current facility-administered medications for this encounter.       Current Outpatient Medications   Medication Sig Dispense Refill    metroNIDAZOLE (FLAGYL) 500 MG tablet Take 1 tablet by mouth every 8 hours for 12 days 36 tablet 0    amoxicillin (AMOXIL) 500 MG capsule Take 1 capsule by mouth every 8 hours for 12 days 36 capsule 0    ibuprofen (ADVIL;MOTRIN) 600 MG tablet Take 1 tablet by mouth every 6 hours as needed for Pain 40 tablet 0    docusate sodium (COLACE) 100 MG capsule Take 1 capsule by mouth 2 times daily as needed for Constipation 60 capsule 0    valACYclovir (VALTREX) 500 MG tablet Take 1 tablet by mouth 2 times daily 60 tablet 1    acetaminophen (TYLENOL) 500 MG tablet Take 500 mg by mouth every 6 hours as needed for Pain      calcium carbonate (TUMS E-X 750) 750 MG chewable tablet Take 2 tablets by mouth daily Indications: \"heartburn\"      ondansetron (ZOFRAN) 4 MG tablet Take 1 tablet by mouth every 8 hours as needed for Nausea or Vomiting 15 tablet 0    phenazopyridine (PYRIDIUM) 100 MG tablet Take 1 tablet by mouth 3 times daily as needed for Pain 10 tablet 0    Prenatal Vit-Fe Fumarate-FA (PRENATAL VITAMIN PO) Take 1 capsule by mouth daily         FAMILY HISTORY:  Family History of Breast, Ovarian, Colon or Uterine Cancer: No   family history includes Alcohol Abuse in her father; Alzheimer's Disease in her maternal grandfather; Anxiety Disorder in her maternal grandmother; Depression in her father; Diabetes in her brother; Diabetes type 2  in her maternal grandmother; Drug Abuse in her father; Heart Disease in her paternal grandfather, paternal grandmother, and another family member; Heart Failure in her maternal grandfather; No Known Problems in her sister;  Labor in her mother; Marchia Stack Abortions in her maternal grandmother; Thyroid Disease in her mother. SOCIAL HISTORY:   reports that she has quit smoking. Her smoking use included cigarettes. She has never used smokeless tobacco. She reports that she does not drink alcohol or use drugs. ________________________________________________________________________                                    Amedeo Quiet:  Vitals:    10/04/20 1944 10/04/20 1951 10/04/20 2130   BP:  102/65    Pulse:  90 94   Resp:  8    Temp: 98.1 °F (36.7 °C)     TempSrc: Temporal     SpO2:  98%    Weight:  160 lb (72.6 kg)    Height:  5' 3\" (1.6 m)                                                     INPUT/OUTPUT:  No intake/output data recorded.   No intake/output data recorded. PHYSICAL EXAM:     General Appearance: Alert; in no acute distress. Skin: Skin color, texture, turgor normal. No rashes or lesions. Lymphatic: No abnormally enlarged lymph nodes. Neck and EENT: normal atraumatic, no neck masses, normal thyroid  Respiratory: Normal expansion. Clear to auscultation. No rales, rhonchi, or wheezing. Cardiovascular: normal, regular rate and rhythm, no murmurs, rubs, or gallops  Breast: (Chest) breasts appear normal, symmetrical  Abdomen: minimal diffuse tenderness including mild tendernes at uterine fundus, uterine fundus palpated well below umbilicus, soft, non-tender, non-distended and no right upper quadrant tenderness, no rebound, guarding, or rigidity. Rectal Exam: not indicated  Musculoskeletal: no gross abnormalities  Extremities: non-tender BLE and non-edematous  Psych:  oriented to time, place and person     LAB RESULTS:  Results for orders placed or performed during the hospital encounter of 10/04/20   Culture, Anaerobic and Aerobic    Specimen: Abscess   Result Value Ref Range    Specimen Description . ABSCESS     Special Requests NOT REPORTED     Direct Exam       DUE TO THE SPECIMEN TYPE, THE ORDER WAS CANCELED AND REORDERED. PLEASE REFER TO: GENITAL CULTURE    Culture NOT REPORTED    VAGINITIS DNA PROBE    Specimen: Vaginal   Result Value Ref Range    Specimen Description . VAGINA     Special Requests NOT REPORTED     Direct Exam NEGATIVE for Candida sp. Direct Exam NEGATIVE for Gardnerella vaginalis     Direct Exam NEGATIVE for Trichomonas vaginalis     Direct Exam       Method of testing is a DNA probe intended for detection and identification of Candida species, Gardnerella vaginalis, and Trichomonas vaginalis nucleic acid in vaginal fluid specimens from patients with symptoms of vaginitis/vaginosis.    CBC WITH AUTO DIFFERENTIAL   Result Value Ref Range    WBC 24.4 (H) 3.5 - 11.3 k/uL    RBC 4.15 3.95 - 5.11 m/uL    Hemoglobin 9.6 (L) 11.9 - 15.1 g/dL    Hematocrit 33.3 (L) 36.3 - 47.1 %    MCV 80.2 (L) 82.6 - 102.9 fL    MCH 23.1 (L) 25.2 - 33.5 pg    MCHC 28.8 28.4 - 34.8 g/dL    RDW 17.5 (H) 11.8 - 14.4 %    Platelets 496 (H) 327 - 453 k/uL    MPV 9.4 8.1 - 13.5 fL    NRBC Automated 0.0 0.0 per 100 WBC    Differential Type NOT REPORTED     WBC Morphology NOT REPORTED     RBC Morphology NOT REPORTED     Platelet Estimate NOT REPORTED     Immature Granulocytes 0 0 %    Seg Neutrophils 82 (H) 36 - 66 %    Lymphocytes 15 (L) 24 - 44 %    Monocytes 2 1 - 7 %    Eosinophils % 1 1 - 4 %    Basophils 0 0 - 2 %    Absolute Immature Granulocyte 0.00 0.00 - 0.30 k/uL    Segs Absolute 20.01 (H) 1.8 - 7.7 k/uL    Absolute Lymph # 3.66 1.0 - 4.8 k/uL    Absolute Mono # 0.49 0.1 - 0.8 k/uL    Absolute Eos # 0.24 0.0 - 0.4 k/uL    Basophils Absolute 0.00 0.0 - 0.2 k/uL    Morphology MICROCYTOSIS PRESENT     Morphology ANISOCYTOSIS PRESENT    BASIC METABOLIC PANEL   Result Value Ref Range    Glucose 112 (H) 70 - 99 mg/dL    BUN 11 6 - 20 mg/dL    CREATININE 0.51 0.50 - 0.90 mg/dL    Bun/Cre Ratio NOT REPORTED 9 - 20    Calcium 8.2 (L) 8.6 - 10.4 mg/dL    Sodium 135 135 - 144 mmol/L    Potassium 3.3 (L) 3.7 - 5.3 mmol/L    Chloride 102 98 - 107 mmol/L    CO2 21 20 - 31 mmol/L    Anion Gap 12 9 - 17 mmol/L    GFR Non-African American >60 >60 mL/min    GFR African American >60 >60 mL/min    GFR Comment          GFR Staging NOT REPORTED    Hepatic Function Panel   Result Value Ref Range    Alb 3.6 3.5 - 5.2 g/dL    Alkaline Phosphatase 100 35 - 104 U/L    ALT 13 5 - 33 U/L    AST 15 <32 U/L    Total Bilirubin 0.31 0.3 - 1.2 mg/dL    Bilirubin, Direct <0.08 <0.31 mg/dL    Bilirubin, Indirect CANNOT BE CALCULATED 0.00 - 1.00 mg/dL    Total Protein 6.7 6.4 - 8.3 g/dL    Globulin NOT REPORTED 1.5 - 3.8 g/dL    Albumin/Globulin Ratio 1.2 1.0 - 2.5   Protime-INR   Result Value Ref Range    Protime 10.9 9.0 - 12.0 sec    INR 1.0    APTT   Result Value Ref Range    PTT 26.6 20.5 - 30.5 sec         DIAGNOSTICS:    Ct Abdomen Pelvis W Iv Contrast Additional Contrast? None    Result Date: 10/4/2020  EXAMINATION: CT OF THE ABDOMEN AND PELVIS WITH CONTRAST 10/4/2020 10:08 pm TECHNIQUE: CT of the abdomen and pelvis was performed with the administration of intravenous contrast. Multiplanar reformatted images are provided for review. Dose modulation, iterative reconstruction, and/or weight based adjustment of the mA/kV was utilized to reduce the radiation dose to as low as reasonably achievable. COMPARISON: None. HISTORY: ORDERING SYSTEM PROVIDED HISTORY: B/L pelvic pain, Hx of spont vag delivery on 0/23 complicated by post partum endometritis. off antibiotics now septic. also vaginal bleeding TECHNOLOGIST PROVIDED HISTORY: B/L pelvic pain, Hx of spont vag delivery on 0/20 complicated by post partum endometritis. off antibiotics now septic.  also vaginal bleeding Reason for Exam: abd pain fever, 2 weeks post vaginal delivery Acuity: Acute Type of Exam: Initial FINDINGS: Lower Chest: Lung bases are clear Organs: Liver, gallbladder, spleen, adrenal glands, kidneys, and pancreas unremarkable. GI/Bowel: No evidence of bowel obstruction. Retained stool throughout the colon. Appendix unremarkable. Pelvis: Uterus mild prominent size with heterogeneous appearance of the myometrium likely related to recent post partum status. Mild prominence endometrial cavity noted nonspecific. No gas identified within the endometrium or uterus. .  No suspicious adnexal mass. Bladder is unremarkable Peritoneum/Retroperitoneum: No free air or free fluid. Aorta is unremarkable caliber. Bones/Soft Tissues: No suspicious osseous lesion. Postpartum appearance of the uterus with fluid identified within the endometrial cavity. This is a nonspecific finding.   Please correlate exam findings. No loculated fluid collections to suggest abscess formation. Xr Chest Portable    Result Date: 9/24/2020  EXAMINATION: ONE XRAY VIEW OF THE CHEST 9/24/2020 8:17 pm COMPARISON: None. HISTORY: ORDERING SYSTEM PROVIDED HISTORY: 1 week post-partum sob TECHNOLOGIST PROVIDED HISTORY: 1 week post-partum sob Reason for Exam: 1 week post-partum; SOB Acuity: Acute Type of Exam: Initial FINDINGS: Single portable frontal view of the chest is submitted for review. The cardiac silhouette is borderline prominent. Mild central vascular congestion without focal airspace consolidation, sizeable pleural effusion, or pneumothorax. Trachea is midline. Visualized osseous structures and soft tissues are grossly intact. Cardiac silhouette is borderline prominent and there is mild central vascular congestion. No focal airspace consolidation, sizeable pleural effusion or pneumothorax. Ct Chest Pulmonary Embolism W Contrast    Result Date: 9/28/2020  EXAMINATION: CTA OF THE CHEST 9/28/2020 2:31 am TECHNIQUE: CTA of the chest was performed after the administration of intravenous contrast.  Multiplanar reformatted images are provided for review. MIP images are provided for review. Dose modulation, iterative reconstruction, and/or weight based adjustment of the mA/kV was utilized to reduce the radiation dose to as low as reasonably achievable. COMPARISON: None. HISTORY: ORDERING SYSTEM PROVIDED HISTORY: Postpartum chest pain, SOB and tachycardia TECHNOLOGIST PROVIDED HISTORY: Postpartum chest pain, SOB and tachycardia Reason for Exam: postpartum chest pain, sob and tachycardia Acuity: Acute Type of Exam: Initial FINDINGS: Pulmonary Arteries: Pulmonary arteries are adequately opacified for evaluation. No evidence of intraluminal filling defect to suggest pulmonary embolism. Main pulmonary artery is normal in caliber. Mediastinum: No evidence of mediastinal lymphadenopathy. The heart size is mildly prominent.   The heart and pericardium are otherwise without acute abnormality. There is no acute abnormality of the thoracic aorta. Lungs/pleura: The lungs are without acute process. No focal consolidation or pulmonary edema. No evidence of pleural effusion or pneumothorax. Upper Abdomen: Limited images of the upper abdomen are unremarkable. Soft Tissues/Bones: No acute or aggressive osseous lesions. Enlarged bilateral axillary lymph nodes may be reactive. 1. No evidence of pulmonary embolism or acute pulmonary abnormality. 2. Mildly prominent heart size. Given chest pain, clinical correlation is recommended. 3. Bilateral axillary lymphadenopathy. Clinical follow-up is recommended. ASSESSMENT & PLAN:    Blanca Fitzpatrick is a 30 y.o. female  PPD#17   - VSS, afebrile   - breast pumping with no s/s of mastitis   - Reports having minimal vaginal bleeding   - Continue postpartum restrictions   - Continue PNV and Iron    SIRS/Suspected PP Endometritis   - Patient meets criteria for SIRS based on leukocytosis and HR >90   - VSS, afebrile on admission   - WBC 24.4   - Vaginitis negative, GC/C pending   - UA completed, will await urine culture   - Lactate wnl   - BC x2 pending   - CT abd/pelvis wnl for postpartum uterus, no loculated fluid collections to suggest abscess formation   - S/p Clinda/Gent x24h ()   - PO Amox/Flagyl q8h x14d - noncompliant   - s/p 1g Meropenem (10/4), given while in ED, continue Meropenem x24h   - Admitted for observation under service of Dr. Bipin Awan   - NS @ 125mL/hr   - CBC,BMP ordered for AM   - Motrin/Tylenol/Colace ordered   - Continue to monitor closely.      Hypokalemia   - K 3.3 on admission   - Received KCl 40mEq   - Repeat BMP in AM    Anemia    - VSS, clinically asymptomatic   - Hgb 9.6 on admission   - Continue iron supplementation    Hx Depression/Anxiety   - No meds   - Mood stable   - Denies SI/HI    BMI 29.2      Patient Active Problem List    Diagnosis Date Noted    Sepsis ([facility] [address] 10/04/2020    Tachycardia     Postpartum endometritis 2020     20 F APG9,9 Wt7-4 2020    Anxiety     Herpes genitalis in women 2018     · suppressive therapy at 36 weeks      History of depression 2018     · In therapy, does not desires medications  · EPDS _         Plan discussed with Dr. Demarco Pro, who is agreeable. Attending's Name: Dr. Tracy Houston DO  Ob/Gyn Resident  Pager: [phone]  [facility]  10/4/2020, 11:58 PM          Resident Physician Statement  I have discussed the case, including pertinent history and exam findings with the above resident. I have personally seen the patient. I agree with the assessment, plan and orders as documented. I have made changes to the above note as needed. I have discussed the case with above named attending.        Juan Llanes DO  OB/GYN Resident  Pgr: [phone]  10/5/2020  3:36 AM

## 2020-10-05 NOTE — ED PROVIDER NOTES
[facility] ED  eMERGENCY dEPARTMENT eNCOUnter   Attending Attestation     Pt Name: Rahul Hazel  MRN: [id number]  Pandagfginger 1990  Date of evaluation: 10/4/20       Rahul Hazel is a 30 y.o. female who presents with No chief complaint on file. History: Patient presents with abdominal pain, fevers, vaginal discharge/lochia. Patient is postpartum from 66. Patient was diagnosed with endometritis. Patient is been treated multiple times with antibiotics. Patient recently developed a rash and was told to stop taking antibiotics by the midwife. Patient was not placed on any other meds  Exam: Heart rate and rhythm are regular. Lungs are clear to auscultation bilaterally. Abdomen is soft, tenderness in the lower abdomen. Please see resident note for pelvic exam.    Plan for CT scan of the abdomen, blood work, will be consult, probable admission. I performed a history and physical examination of the patient and discussed management with the resident. I reviewed the residents note and agree with the documented findings and plan of care. Any areas of disagreement are noted on the chart. I was personally present for the key portions of any procedures. I have documented in the chart those procedures where I was not present during the key portions. I have personally reviewed all images and agree with the resident's interpretation. I have reviewed the emergency nurses triage note. I agree with the chief complaint, past medical history, past surgical history, allergies, medications, social and family history as documented unless otherwise noted below. Documentation of the HPI, Physical Exam and Medical Decision Making performed by medical students or scribes is based on my personal performance of the HPI, PE and MDM.  For Phys Assistant/ Nurse Practitioner cases/documentation I have had a face to face evaluation of this patient and have completed at least one if not all key elements of the

## 2020-10-05 NOTE — CONSULTS
[address]    Patient Name: Shane Moura     Patient : 1990  Room/Bed:   Admission Date/Time: 10/4/2020  7:45 PM  Primary Care Physician: DENNY Lopez CNP    Consulting Provider: Dr. Roxanna Siu MD  Reason for Consult: Fever, Hx of PP endometritis    CC:   Chief Complaint   Patient presents with    Fever                HPI: Shane Moura is a 30 y.o. female  presents to the ED, c/o fever for the past 24 hours. Patient was diagnosed with postpartum endometritis , s/p Clinda/Gent x24h. PO Amoxicillin/Flagyl q8h x14d, patient did not finish due to rash. Patient's last menstrual period was 2019. She reports she took antibiotics for a couple of days but stopped due to an itchy rash on her chest and arms. Then after stopping the antibiotics she noticed that she had a fever at home (102.0F) starting today. She reports subjective fever and chills. Minimal vaginal bleeding. She reports she is breast pumping. At 1600 she took tylenol. She is not using any form of contraception. Minimal relief with tylenol and motrin. REVIEW OF SYSTEMS:   A minimum of an eleven point review of systems was completed.     Constitutional: positive fever, positive chills  HEENT: negative visual disturbances, negative headaches  Respiratory: negative dyspnea, negative cough  Cardiovascular: negative chest pain,  negative palpitations  Gastrointestinal: positive abdominal pain, negative RUQ pain, positive N/V, negative diarrhea, negative constipation  Genitourinary: negative dysuria, negative vaginal discharge, positive vaginal bleeding  Dermatological: negative rash, negative wounds  Hematologic: negative bleeding/clotting disorder  Immunologic: negative recent illness, negative recent sick contact, negative allergic reactions  Lymphatic: negative lymph nodes  Musculoskeletal: negative back pain, negative myalgias, negative arthralgias  Neurological: negative dizziness, negative weakness  Behavior/Psych: negative depression, negative anxiety  _______________________________________________________________________      OBSTETRICAL HISTORY:   OB History    Para Term  AB Living   3 1 1 0 1 1   SAB TAB Ectopic Molar Multiple Live Births   1 0 0 0 0 1      # Outcome Date GA Lbr Jaylen/2nd Weight Sex Delivery Anes PTL Lv   3 Current            2 Term 18 38w0d  6 lb 13 oz (3.09 kg) M Vag-Spont EPI N GLADYS      Birth Comments: Nitrous & Epidural      Name: Trevor Castillo: [address] [place] [address] [place]: 9   1 2017               PAST MEDICAL HISTORY:   has a past medical history of Anxiety, Depression, H/O borderline personality disorder, Head trauma in child, Herpes simplex virus (HSV) infection, PTSD (post-traumatic stress disorder), Social anxiety disorder, and Trauma. PAST SURGICAL HISTORY:   has a past surgical history that includes Colposcopy and [place] tooth extraction. ALLERGIES:  Allergies as of 10/04/2020 - Review Complete 2020   Allergen Reaction Noted    Other Rash 2020       MEDICATIONS:  No current facility-administered medications for this encounter.       Current Outpatient Medications   Medication Sig Dispense Refill    metroNIDAZOLE (FLAGYL) 500 MG tablet Take 1 tablet by mouth every 8 hours for 12 days 36 tablet 0    amoxicillin (AMOXIL) 500 MG capsule Take 1 capsule by mouth every 8 hours for 12 days 36 capsule 0    ibuprofen (ADVIL;MOTRIN) 600 MG tablet Take 1 tablet by mouth every 6 hours as needed for Pain 40 tablet 0    docusate sodium (COLACE) 100 MG capsule Take 1 capsule by mouth 2 times daily as needed for Constipation 60 capsule 0    valACYclovir (VALTREX) 500 MG tablet Take 1 tablet by mouth 2 times daily 60 tablet 1    acetaminophen (TYLENOL) 500 MG tablet Take 500 mg by mouth every 6 hours as needed for Pain      calcium carbonate (TUMS E-X 750) 750 MG chewable tablet Take 2 tablets by mouth daily Indications: \"heartburn\"      ondansetron (ZOFRAN) 4 MG tablet Take 1 tablet by mouth every 8 hours as needed for Nausea or Vomiting 15 tablet 0    phenazopyridine (PYRIDIUM) 100 MG tablet Take 1 tablet by mouth 3 times daily as needed for Pain 10 tablet 0    Prenatal Vit-Fe Fumarate-FA (PRENATAL VITAMIN PO) Take 1 capsule by mouth daily         FAMILY HISTORY:  Family History of Breast, Ovarian, Colon or Uterine Cancer: No   family history includes Alcohol Abuse in her father; Alzheimer's Disease in her maternal grandfather; Anxiety Disorder in her maternal grandmother; Depression in her father; Diabetes in her brother; Diabetes type 2  in her maternal grandmother; Drug Abuse in her father; Heart Disease in her paternal grandfather, paternal grandmother, and another family member; Heart Failure in her maternal grandfather; No Known Problems in her sister;  Labor in her mother; Marchia Stack Abortions in her maternal grandmother; Thyroid Disease in her mother. SOCIAL HISTORY:   reports that she has quit smoking. Her smoking use included cigarettes. She has never used smokeless tobacco. She reports that she does not drink alcohol or use drugs. ________________________________________________________________________                                    Amedeo Quiet:  Vitals:    10/04/20 1944 10/04/20 1951 10/04/20 2130   BP:  102/65    Pulse:  90 94   Resp:  8    Temp: 98.1 °F (36.7 °C)     TempSrc: Temporal     SpO2:  98%    Weight:  160 lb (72.6 kg)    Height:  5' 3\" (1.6 m)                                                     INPUT/OUTPUT:  No intake/output data recorded. No intake/output data recorded. PHYSICAL EXAM:     General Appearance: Alert; in no acute distress. Skin: Skin color, texture, turgor normal. No rashes or lesions.   Lymphatic: No abnormally enlarged lymph nodes.  Neck and EENT: normal atraumatic, no neck masses, normal thyroid  Respiratory: Normal expansion. Clear to auscultation. No rales, rhonchi, or wheezing. Cardiovascular: normal, regular rate and rhythm, no murmurs, rubs, or gallops  Breast: (Chest) breasts appear normal, symmetrical  Abdomen: minimal diffuse tenderness, soft, non-tender, non-distended and no right upper quadrant tenderness, no rebound, guarding, or rigidity  Rectal Exam: not indicated  Musculoskeletal: no gross abnormalities  Extremities: non-tender BLE and non-edematous  Psych:  oriented to time, place and person     LAB RESULTS:  Results for orders placed or performed during the hospital encounter of 10/04/20   Culture, Anaerobic and Aerobic    Specimen: Abscess   Result Value Ref Range    Specimen Description . ABSCESS     Special Requests NOT REPORTED     Direct Exam       DUE TO THE SPECIMEN TYPE, THE ORDER WAS CANCELED AND REORDERED. PLEASE REFER TO: GENITAL CULTURE    Culture NOT REPORTED    VAGINITIS DNA PROBE    Specimen: Vaginal   Result Value Ref Range    Specimen Description . VAGINA     Special Requests NOT REPORTED     Direct Exam NEGATIVE for Candida sp. Direct Exam NEGATIVE for Gardnerella vaginalis     Direct Exam NEGATIVE for Trichomonas vaginalis     Direct Exam       Method of testing is a DNA probe intended for detection and identification of Candida species, Gardnerella vaginalis, and Trichomonas vaginalis nucleic acid in vaginal fluid specimens from patients with symptoms of vaginitis/vaginosis.    CBC WITH AUTO DIFFERENTIAL   Result Value Ref Range    WBC 24.4 (H) 3.5 - 11.3 k/uL    RBC 4.15 3.95 - 5.11 m/uL    Hemoglobin 9.6 (L) 11.9 - 15.1 g/dL    Hematocrit 33.3 (L) 36.3 - 47.1 %    MCV 80.2 (L) 82.6 - 102.9 fL    MCH 23.1 (L) 25.2 - 33.5 pg    MCHC 28.8 28.4 - 34.8 g/dL    RDW 17.5 (H) 11.8 - 14.4 %    Platelets 197 (H) 975 - 453 k/uL    MPV 9.4 8.1 - 13.5 fL    NRBC Automated 0.0 0.0 per 100 WBC    Differential intravenous contrast. Multiplanar reformatted images are provided for review. Dose modulation, iterative reconstruction, and/or weight based adjustment of the mA/kV was utilized to reduce the radiation dose to as low as reasonably achievable. COMPARISON: None. HISTORY: ORDERING SYSTEM PROVIDED HISTORY: B/L pelvic pain, Hx of spont vag delivery on 5/35 complicated by post partum endometritis. off antibiotics now septic. also vaginal bleeding TECHNOLOGIST PROVIDED HISTORY: B/L pelvic pain, Hx of spont vag delivery on 9/71 complicated by post partum endometritis. off antibiotics now septic.  also vaginal bleeding Reason for Exam: abd pain fever, 2 weeks post vaginal delivery Acuity: Acute Type of Exam: Initial FINDINGS: Lower Chest: Lung bases are clear Organs: Liver, gallbladder, spleen, adrenal glands, kidneys, and pancreas unremarkable. GI/Bowel: No evidence of bowel obstruction. Retained stool throughout the colon. Appendix unremarkable. Pelvis: Uterus mild prominent size with heterogeneous appearance of the myometrium likely related to recent post partum status. Mild prominence endometrial cavity noted nonspecific. No gas identified within the endometrium or uterus. .  No suspicious adnexal mass. Bladder is unremarkable Peritoneum/Retroperitoneum: No free air or free fluid. Aorta is unremarkable caliber. Bones/Soft Tissues: No suspicious osseous lesion. Postpartum appearance of the uterus with fluid identified within the endometrial cavity. This is a nonspecific finding. Please correlate exam findings. No loculated fluid collections to suggest abscess formation. Xr Chest Portable    Result Date: 9/24/2020  EXAMINATION: ONE XRAY VIEW OF THE CHEST 9/24/2020 8:17 pm COMPARISON: None.  HISTORY: ORDERING SYSTEM PROVIDED HISTORY: 1 week post-partum sob TECHNOLOGIST PROVIDED HISTORY: 1 week post-partum sob Reason for Exam: 1 week post-partum; SOB Acuity: Acute Type of Exam: Initial FINDINGS: Single portable frontal view of the chest is submitted for review. The cardiac silhouette is borderline prominent. Mild central vascular congestion without focal airspace consolidation, sizeable pleural effusion, or pneumothorax. Trachea is midline. Visualized osseous structures and soft tissues are grossly intact. Cardiac silhouette is borderline prominent and there is mild central vascular congestion. No focal airspace consolidation, sizeable pleural effusion or pneumothorax. Ct Chest Pulmonary Embolism W Contrast    Result Date: 9/28/2020  EXAMINATION: CTA OF THE CHEST 9/28/2020 2:31 am TECHNIQUE: CTA of the chest was performed after the administration of intravenous contrast.  Multiplanar reformatted images are provided for review. MIP images are provided for review. Dose modulation, iterative reconstruction, and/or weight based adjustment of the mA/kV was utilized to reduce the radiation dose to as low as reasonably achievable. COMPARISON: None. HISTORY: ORDERING SYSTEM PROVIDED HISTORY: Postpartum chest pain, SOB and tachycardia TECHNOLOGIST PROVIDED HISTORY: Postpartum chest pain, SOB and tachycardia Reason for Exam: postpartum chest pain, sob and tachycardia Acuity: Acute Type of Exam: Initial FINDINGS: Pulmonary Arteries: Pulmonary arteries are adequately opacified for evaluation. No evidence of intraluminal filling defect to suggest pulmonary embolism. Main pulmonary artery is normal in caliber. Mediastinum: No evidence of mediastinal lymphadenopathy. The heart size is mildly prominent. The heart and pericardium are otherwise without acute abnormality. There is no acute abnormality of the thoracic aorta. Lungs/pleura: The lungs are without acute process. No focal consolidation or pulmonary edema. No evidence of pleural effusion or pneumothorax. Upper Abdomen: Limited images of the upper abdomen are unremarkable. Soft Tissues/Bones: No acute or aggressive osseous lesions.   Enlarged bilateral axillary lymph nodes may be reactive. 1. No evidence of pulmonary embolism or acute pulmonary abnormality. 2. Mildly prominent heart size. Given chest pain, clinical correlation is recommended. 3. Bilateral axillary lymphadenopathy. Clinical follow-up is recommended. ASSESSMENT & PLAN:    Ramez Crowder is a 30 y.o. female     Suspected PP Endometritis   -  20   - VSS, afebrile   - WBC 24.4   - Vaginitis negative   - UA suspicious for UTI   - Lactate pending   - GC/C pending   - BC x2   - CT abd/pelvis wnl for postpartum uterus, no loculated fluid collections to suggest abscess formation   - S/p Clinda/Gent x24h ()   - PO Amox/Flagyl q8h x14d - noncompliant   - s/p 1g Meropenem (10/4), given while in ED, continue Meropenem x24h   - Admit for observation under service of Dr. Yulissa Villegas   - NS @ 125mL/hr   - CBC, BMP ordered for AM   - Motrin/Colace ordered   - Continue to monitor closely    Hx Depression/Anxiety   - No meds   - Mood stable   - Denies SI/HI    BMI 29.2      Patient Active Problem List    Diagnosis Date Noted    Sepsis ([facility] [address] 10/04/2020    Tachycardia     Postpartum endometritis 2020     21 F APG9,9 Wt7-4 2020    Anxiety     Herpes genitalis in women 2018     · suppressive therapy at 36 weeks      History of depression 2018     · In therapy, does not desires medications  · EPDS 14/ _ / 7_         Plan discussed with Dr. Yulissa Villegas, who is agreeable.      Attending's Name: Dr. Anca Staples DO  Ob/Gyn Resident  Pager: [phone]  [address]  10/4/2020, 11:58 PM

## 2020-10-05 NOTE — ED PROVIDER NOTES
[facility] ED  Emergency Department Encounter  EmergencyMedicine Resident     Pt Jody Simpson  MRN: [id number]  Edward 1990  Date of evaluation: 10/4/20  PCP:  DENNY Deleon 6866       Chief Complaint   Patient presents with    Fever       HISTORY OF PRESENT ILLNESS  (Location/Symptom, Timing/Onset, Context/Setting, Quality, Duration, Modifying Factors, Severity.)      Kya Kramer is a 30 y.o. female who presents with fevers, generalized fatigue. Patient had a spontaneous vaginal delivery on 9/17/2020 she was admitted for inpatient management postpartum endometrial-itis. This was her second admission for this problem. She received IV antibiotics and was discharged home on 9/28/2020 with amoxicillin and metronidazole. Patient however broke out in a diffuse rash. She called her midwife who instructed her to stop taking the antibiotics to do allergic reaction. She did not call her OB/GYN regarding different antibiotic management. Since this time she is continued to have intermittent fevers, fatigue, and some bilateral pelvic pain. He denies any vaginal discharge. States that she is still having some scant vaginal bleeding since her vaginal delivery. Denies any sore throat congestion, cough, shortness of breath, abdominal pain, nausea, vomiting, diarrhea, known sick contacts. Patient has been treating herself with Tylenol and Motrin. Her last dose of Tylenol was 1 g at 5 PM and also had some Motrin at that time as well. PAST MEDICAL / SURGICAL / SOCIAL / FAMILY HISTORY      has a past medical history of Anxiety, Depression, H/O borderline personality disorder, Head trauma in child, Herpes simplex virus (HSV) infection, PTSD (post-traumatic stress disorder), Social anxiety disorder, and Trauma. has a past surgical history that includes Colposcopy and [place] tooth extraction.     Social History     Socioeconomic History    Marital status: Single     Spouse name: Not on file    Number of children: 1    Years of education: Not on file    Highest education level: Not on file   Occupational History    Not on file   Social Needs    Financial resource strain: Not very hard    Food insecurity     Worry: Never true     Inability: Never true    Transportation needs     Medical: No     Non-medical: No   Tobacco Use    Smoking status: Former Smoker     Types: Cigarettes    Smokeless tobacco: Never Used    Tobacco comment: off and on for several years    Substance and Sexual Activity    Alcohol use: No    Drug use: No    Sexual activity: Yes     Partners: Male     Comment: no BC    Lifestyle    Physical activity     Days per week: Not on file     Minutes per session: Not on file    Stress: Not on file   Relationships    Social connections     Talks on phone: Not on file     Gets together: Not on file     Attends Yarsani service: Not on file     Active member of club or organization: Not on file     Attends meetings of clubs or organizations: Not on file     Relationship status: Not on file    Intimate partner violence     Fear of current or ex partner: Not on file     Emotionally abused: Not on file     Physically abused: Not on file     Forced sexual activity: Not on file   Other Topics Concern    Not on file   Social History Narrative    Not on file       Family History   Problem Relation Age of Onset    Thyroid Disease Mother      Labor Mother         1 child born at 42 weeks     Diabetes Brother         unsure if due to MVA damaging his pancreaus     Spont Abortions Maternal Grandmother     Diabetes type 2  Maternal Grandmother     Anxiety Disorder Maternal Grandmother     Alcohol Abuse Father     Depression Father     Drug Abuse Father     No Known Problems Sister     Heart Failure Maternal Grandfather         COD     Alzheimer's Disease Maternal Grandfather     Heart Disease Paternal Grandmother     Heart Disease Paternal Grandfather     Heart Disease Other         COD     Breast Cancer Neg Hx     Cancer Neg Hx     Colon Cancer Neg Hx     Eclampsia Neg Hx     Hypertension Neg Hx     Ovarian Cancer Neg Hx     Stroke Neg Hx        Allergies: Other    Home Medications:  Prior to Admission medications    Medication Sig Start Date End Date Taking? Authorizing Provider   metroNIDAZOLE (FLAGYL) 500 MG tablet Take 1 tablet by mouth every 8 hours for 12 days 9/28/20 10/10/20  Roslyn Castañeda, DO   amoxicillin (AMOXIL) 500 MG capsule Take 1 capsule by mouth every 8 hours for 12 days 9/28/20 10/10/20  Roslyn Castañeda, DO   ibuprofen (ADVIL;MOTRIN) 600 MG tablet Take 1 tablet by mouth every 6 hours as needed for Pain 9/17/20   Herrera Faustin, DO   docusate sodium (COLACE) 100 MG capsule Take 1 capsule by mouth 2 times daily as needed for Constipation 9/17/20   Herrera Faustin, DO   valACYclovir (VALTREX) 500 MG tablet Take 1 tablet by mouth 2 times daily 8/18/20   DENNY Garcia CNM   acetaminophen (TYLENOL) 500 MG tablet Take 500 mg by mouth every 6 hours as needed for Pain    Historical Provider, MD   calcium carbonate (TUMS E-X 750) 750 MG chewable tablet Take 2 tablets by mouth daily Indications: \"heartburn\"    Historical Provider, MD   ondansetron (ZOFRAN) 4 MG tablet Take 1 tablet by mouth every 8 hours as needed for Nausea or Vomiting 3/26/20   DENNY Garcia CNM   phenazopyridine (PYRIDIUM) 100 MG tablet Take 1 tablet by mouth 3 times daily as needed for Pain 2/28/20 2/27/21  DENNY Brian CNM   Prenatal Vit-Fe Fumarate-FA (PRENATAL VITAMIN PO) Take 1 capsule by mouth daily    Historical Provider, MD       REVIEW OF SYSTEMS    (2-9 systems for level 4, 10 or more for level 5)      Review of Systems   Constitutional: Positive for fatigue and fever. Negative for chills. HENT: Negative for congestion and sore throat. Respiratory: Negative for cough and shortness of breath. Cardiovascular: Negative for chest pain. Gastrointestinal: Negative for abdominal pain, nausea and vomiting. Genitourinary: Positive for vaginal bleeding. Negative for dysuria, frequency and vaginal discharge. Musculoskeletal: Negative for back pain and neck stiffness. Skin: Negative for rash. Neurological: Negative for weakness and headaches. PHYSICAL EXAM   (up to 7 for level 4, 8 or more for level 5)      INITIAL VITALS:   /65   Pulse 94   Temp 98.1 °F (36.7 °C) (Temporal)   Resp 8   Ht 5' 3\" (1.6 m)   Wt 160 lb (72.6 kg)   LMP 12/19/2019   SpO2 98%   BMI 28.34 kg/m²      Vitals:    10/04/20 1944 10/04/20 1951 10/04/20 2130   BP:  102/65    Pulse:  90 94   Resp:  8    Temp: 98.1 °F (36.7 °C)     TempSrc: Temporal     SpO2:  98%    Weight:  160 lb (72.6 kg)    Height:  5' 3\" (1.6 m)         Physical Exam  Vitals signs reviewed. Constitutional:       General: She is not in acute distress. Appearance: She is well-developed. She is ill-appearing. She is not toxic-appearing or diaphoretic. HENT:      Head: Normocephalic and atraumatic. Eyes:      Extraocular Movements: Extraocular movements intact. Pupils: Pupils are equal, round, and reactive to light. Neck:      Musculoskeletal: Normal range of motion and neck supple. Cardiovascular:      Rate and Rhythm: Normal rate and regular rhythm. Pulmonary:      Effort: Pulmonary effort is normal.      Breath sounds: Normal breath sounds. Abdominal:      General: Bowel sounds are normal. There is no distension. Palpations: Abdomen is soft. Tenderness: There is abdominal tenderness (RLQ, RRQ pain. ). There is no guarding or rebound. Genitourinary:     Vagina: Vaginal discharge present. Comments: Mild cervical motion tenderness. Vaginal mucosa is pink moist.  There is some dark red blood coming from the vaginal loss, some dark brown fluid as well. Musculoskeletal: Normal range of motion. Lymphadenopathy:      Cervical: No cervical adenopathy. Skin:     General: Skin is warm and dry. Neurological:      General: No focal deficit present. Mental Status: She is alert and oriented to person, place, and time. DIFFERENTIAL  DIAGNOSIS     PLAN (LABS / IMAGING / EKG):  Orders Placed This Encounter   Procedures    Culture, Blood 1    Culture, Blood 2    Culture, Urine    Culture, Anaerobic and Aerobic    VAGINITIS DNA PROBE    C.trachomatis N.gonorrhoeae DNA    Culture, Genital    CT ABDOMEN PELVIS W IV CONTRAST Additional Contrast? None    CBC WITH AUTO DIFFERENTIAL    BASIC METABOLIC PANEL    URINALYSIS WITH MICROSCOPIC    Lactate, Sepsis    Hepatic Function Panel    Protime-INR    APTT    Vaginal exam    Inpatient consult to Obstetrics / Gynecology       MEDICATIONS ORDERED:  Orders Placed This Encounter   Medications    lactated ringers bolus    meropenem (MERREM) 1 g in sodium chloride 0.9 % 100 mL IVPB (mini-bag)     Order Specific Question:   Antimicrobial Indications     Answer: Other     Order Specific Question:   Other Abx Indication     Answer: Suspected Sepsis of Abdominal Origin    iopamidol (ISOVUE-370) 76 % injection 75 mL       DIAGNOSTIC RESULTS / EMERGENCY DEPARTMENT COURSE / MDM   LAB RESULTS:  Results for orders placed or performed during the hospital encounter of 10/04/20   Culture, Anaerobic and Aerobic    Specimen: Abscess   Result Value Ref Range    Specimen Description . ABSCESS     Special Requests NOT REPORTED     Direct Exam       DUE TO THE SPECIMEN TYPE, THE ORDER WAS CANCELED AND REORDERED.  PLEASE REFER TO: GENITAL CULTURE    Culture NOT REPORTED    CBC WITH AUTO DIFFERENTIAL   Result Value Ref Range    WBC 24.4 (H) 3.5 - 11.3 k/uL    RBC 4.15 3.95 - 5.11 m/uL    Hemoglobin 9.6 (L) 11.9 - 15.1 g/dL    Hematocrit 33.3 (L) 36.3 - 47.1 %    MCV 80.2 (L) 82.6 - 102.9 fL    MCH 23.1 (L) 25.2 - 33.5 pg    MCHC 28.8 28.4 - 34.8 g/dL    RDW 17.5 (H) 11.8 - 14.4 %    Platelets 808 (H) 822 - 453 k/uL    MPV 9.4 8.1 - 13.5 fL    NRBC Automated 0.0 0.0 per 100 WBC    Differential Type NOT REPORTED     WBC Morphology NOT REPORTED     RBC Morphology NOT REPORTED     Platelet Estimate NOT REPORTED     Immature Granulocytes 0 0 %    Seg Neutrophils 82 (H) 36 - 66 %    Lymphocytes 15 (L) 24 - 44 %    Monocytes 2 1 - 7 %    Eosinophils % 1 1 - 4 %    Basophils 0 0 - 2 %    Absolute Immature Granulocyte 0.00 0.00 - 0.30 k/uL    Segs Absolute 20.01 (H) 1.8 - 7.7 k/uL    Absolute Lymph # 3.66 1.0 - 4.8 k/uL    Absolute Mono # 0.49 0.1 - 0.8 k/uL    Absolute Eos # 0.24 0.0 - 0.4 k/uL    Basophils Absolute 0.00 0.0 - 0.2 k/uL    Morphology MICROCYTOSIS PRESENT     Morphology ANISOCYTOSIS PRESENT    BASIC METABOLIC PANEL   Result Value Ref Range    Glucose 112 (H) 70 - 99 mg/dL    BUN 11 6 - 20 mg/dL    CREATININE 0.51 0.50 - 0.90 mg/dL    Bun/Cre Ratio NOT REPORTED 9 - 20    Calcium 8.2 (L) 8.6 - 10.4 mg/dL    Sodium 135 135 - 144 mmol/L    Potassium 3.3 (L) 3.7 - 5.3 mmol/L    Chloride 102 98 - 107 mmol/L    CO2 21 20 - 31 mmol/L    Anion Gap 12 9 - 17 mmol/L    GFR Non-African American >60 >60 mL/min    GFR African American >60 >60 mL/min    GFR Comment          GFR Staging NOT REPORTED        IMPRESSION: Sepsis    RADIOLOGY:  CT ABDOMEN PELVIS W IV CONTRAST Additional Contrast? None    (Results Pending)        EKG    All EKG's are interpreted by the Emergency Department Physician who either signs or Co-signs this chart in the absence of a cardiologist.    [facility]:    Patient arrives for evaluation of fevers, bilateral lower abdominal pain, vaginal bleeding. Source of her fever is likely due to medication nonadherence to her medications bacterial endometriosis.   Although she is not febrile here, she did just take a large dose of Tylenol and Motrin prior to arrival.  At time arrival she does not meet sepsis criteria, appears rundown but not

## 2020-10-05 NOTE — ED NOTES
Pt resting on cot, RR even and unlabored, NAD, A&O, call light in reach, denies any needs     Burton Sacks, RN  10/04/20 6520

## 2020-10-05 NOTE — ED NOTES
Pt placed on bedpan  RR even and unlabored, NAD, A&O, call light in reach     Lulu Womack RN  10/05/20 5839

## 2020-10-05 NOTE — PROGRESS NOTES
CNM courtesy round    [facility] is doing well today. Seems to be in good spirits. VSS, afebrile since admission  She continues to pump. Denies s/s of mastitis. Denies any concern with pain or bleeding or s/s of PPD. Denies any other concerns at this time. Baby is currently with FOB. She feels she is emotionally tolerating being in the hospital x3 now PP and being away from the baby.     O.BP (!) 105/59   Pulse 91   Temp 99.3 °F (37.4 °C) (Oral)   Resp 19   Ht 5' 3\" (1.6 m)   Wt 170 lb 6.7 oz (77.3 kg)   LMP 2019   SpO2 97%   Breastfeeding Unknown Comment: Patient just gave birth on   BMI 30.19 kg/m²       A/P  PPD #18 s/p  admitted for observation due to suspected SIRS with recent hx of PP endometritis  -See physician documentation and mangement

## 2020-10-05 NOTE — VCC REMOTE MONITORING
Spoke with Marisabel Montelongo RN regarding sepsis bundle.     Ashlie Salas 20  [phone]

## 2020-10-05 NOTE — ED PROVIDER NOTES
[facility]  Emergency Department  Emergency Medicine Resident Sign-out     Care of Phoebe Garcia was assumed from Dr. Jacqui Gutierrez and is being seen for Fever   . The patient's initial evaluation and plan have been discussed with the prior provider who initially evaluated the patient. EMERGENCY DEPARTMENT COURSE / MEDICAL DECISION MAKING:       MEDICATIONS GIVEN:  Orders Placed This Encounter   Medications    lactated ringers bolus    meropenem (MERREM) 1 g in sodium chloride 0.9 % 100 mL IVPB (mini-bag)     Order Specific Question:   Antimicrobial Indications     Answer: Other     Order Specific Question:   Other Abx Indication     Answer: Suspected Sepsis of Abdominal Origin    iopamidol (ISOVUE-370) 76 % injection 75 mL    potassium chloride (KLOR-CON M) extended release tablet 40 mEq    0.9 % sodium chloride infusion    meropenem (MERREM) 1 g in sodium chloride 0.9 % 100 mL IVPB (mini-bag)     Order Specific Question:   Antimicrobial Indications     Answer:    Other     Order Specific Question:   Other Abx Indication     Answer:   PP endometritis    docusate sodium (COLACE) capsule 100 mg    ibuprofen (ADVIL;MOTRIN) tablet 600 mg    acetaminophen (TYLENOL) tablet 1,000 mg    ferrous sulfate (FE TABS 325) EC tablet 325 mg    prenatal vitamin plus iron 29-1 MG tablet 1 tablet       LABS / RADIOLOGY:     Labs Reviewed   CBC WITH AUTO DIFFERENTIAL - Abnormal; Notable for the following components:       Result Value    WBC 24.4 (*)     Hemoglobin 9.6 (*)     Hematocrit 33.3 (*)     MCV 80.2 (*)     MCH 23.1 (*)     RDW 17.5 (*)     Platelets 219 (*)     Seg Neutrophils 82 (*)     Lymphocytes 15 (*)     Segs Absolute 20.01 (*)     All other components within normal limits   BASIC METABOLIC PANEL - Abnormal; Notable for the following components:    Glucose 112 (*)     Calcium 8.2 (*)     Potassium 3.3 (*)     All other components within normal limits   URINALYSIS WITH MICROSCOPIC - Abnormal; Notable for the following components:    Specific Gravity, UA 1.052 (*)     Urine Hgb LARGE (*)     Leukocyte Esterase, Urine TRACE (*)     All other components within normal limits   CULTURE, ANAEROBIC AND AEROBIC   VAGINITIS DNA PROBE   CULTURE, BLOOD 1   CULTURE, BLOOD 2   CULTURE, URINE   C.TRACHOMATIS N.GONORRHOEAE DNA   CULTURE, GENITAL   LACTATE, SEPSIS   HEPATIC FUNCTION PANEL   PROTIME-INR   APTT   SPECIMEN REJECTION       CT ABDOMEN PELVIS W IV CONTRAST Additional Contrast? None   Final Result   Postpartum appearance of the uterus with fluid identified within the   endometrial cavity. This is a nonspecific finding. Please correlate exam   findings. No loculated fluid collections to suggest abscess formation. RECENT VITALS:     Temp: 99.3 °F (37.4 °C),  Pulse: 91, Resp: 19, BP: (!) 105/59, SpO2: 97 %    This patient is a 30 y.o. Female with a chief complaint of fevers some bilateral pelvic pain. Patient had a diagnosis of postpartum bacterial endometritis. She had multiple admissions requiring IV antibiotics for this complaint. No imaging has been done that time. She was recently discharged on 9/28/2020 with amoxicillin and metronidazole. However she developed a rash and discontinued antibiotics. She returns today for fevers, weakness, fatigue and some bilateral lower abdomen pain. Prior arrival she did take 1 g of Tylenol and some Motrin. States she had a fever 102 Fahrenheit. Found to be septic here. Meropenem started due to possible allergies to either metronidazole or penicillin. Vaginal exam did show some dark red blood and possible purulent discharge. 30 cc/kg LR bolus started. Blood cultures drawn. OB/GYN is consulted awaiting recommendations. CT abdomen pelvis pending. CT the abdomen showed concerns for endometritis without abscess formation, abscess and gynecology stated they would admit patient as primary.   Patient admitted under OB/GYN to stepdown with meropenem. OUTSTANDING TASKS / RECOMMENDATIONS:      1. Await Bed placement  2. FINAL IMPRESSION:     1. Septicemia ([facility] [address]    2. Fever, unspecified fever cause        DISPOSITION:       DISPOSITION:  []  Discharge   []  Transfer -    [x]  Admission -OB/GYN[]  Against Medical Advice   []  Eloped   FOLLOW-UP: No follow-up provider specified.    DISCHARGE MEDICATIONS: Current Discharge Medication List             Fred Seymour MD  Emergency Medicine Resident  [facility]       Fred Seymour MD  Resident  10/05/20 0765

## 2020-10-05 NOTE — PROGRESS NOTES
OB/GYN Rounding Note  POST PARTUM DAY # 18    Jayy Amador is a 30 y.o. female  This patient was seen & examined today.  20 readmitted for SIRS possibly 2/2 postpartum endometritis    Her pregnancy was complicated by:   Patient Active Problem List   Diagnosis    Herpes genitalis in women    History of depression    Anxiety     20 F APG9,9 Wt7-4    Postpartum endometritis    Tachycardia    Sepsis ([address]    Septicemia ([address]    Fever       Today she is doing well without any chief complaint. Her lochia is light. She denies chest pain, shortness of breath, headache, lightheadedness and blurred vision. She is breast pumping and she denies any breast tenderness. She is ambulating well. Her voiding pattern is normal. I reviewed signs and symptoms of post partum depression with the patient, she currently denies any of these symptoms. She is tolerating solids. Vital Signs:  Vitals:    10/05/20 0200 10/05/20 0204 10/05/20 0357 10/05/20 0400   BP: (!) 105/55 (!) 105/55 (!) 105/59 (!) 105/59   Pulse: 98 97 96 91   Resp: 23 24  19   Temp:  98.8 °F (37.1 °C) 99.3 °F (37.4 °C)    TempSrc:  Oral Oral    SpO2: 96%  96% 97%   Weight: 170 lb 6.7 oz (77.3 kg) 170 lb 6.7 oz (77.3 kg)     Height:             Physical Exam:  General:  no apparent distress, alert and cooperative  Neurologic:  alert, oriented, normal speech, no focal findings or movement disorder noted  Lungs:  No increased work of breathing, good air exchange, clear to auscultation bilaterally, no crackles or wheezing  Heart:  Normal apical impulse, regular rate and rhythm, normal S1 and S2, no S3 or S4, and no murmur noted    Abdomen: abdomen soft, non-distended, non-tender  Fundus: non-tender, firm, below umbilicus  Extremities:  no calf tenderness, non edematous    Lab:  Lab Results   Component Value Date    HGB 9.6 (L) 10/04/2020     Lab Results   Component Value Date    HCT 33.3 (L) 10/04/2020       Assessment/Plan:  1.  [facility] Usha Max is a  PPD # 18 s/p  admitted for observation due to suspected SIRS (leukocytosis + HR >90) with recent Hx of PP endometritis   - Doing well, VSS, afebrile   - Minimal vaginal bleeding   - Continue PNV and Iron supplementation.    - Encourage ambulation, continue postpartum restrictions   - Labs pending this AM: CBC, BMP   - UCx, Blood culture x2, and GC/C pending   - Lactate wnl              - CT abd/pelvis wnl for postpartum uterus, no loculated fluid collections to suggest abscess formation              - S/p Clinda/Gent x24h ()              - PO Amox/Flagyl q8h x14d - noncompliant              - S/p 1g Meropenem (10/4), given while in ED, continue Meropenem x24h              - NS @ 125mL/hr              - Motrin/Tylenol/Colace ordered              - Continue to monitor closely. 2. Breast pumping   - Denies s/s of mastitis  3. Hypokalemia   - K 3.3 on admission              - Received KCl 40mEq              - Repeat BMP in AM  4. Anemia              - VSS, clinically asymptomatic              - Hgb 9.6 on admission              - Continue iron supplementation   5. Hx of Depression/Anxiety   - No meds   - Mood stable    - Denies SI/HI      Counseling Completed:  Secondary Smoke risks and Sudden Infant Death Syndrome were reviewed with recommendations. Infant sleeping, \"back to sleep\" and avoidance of co-sleeping recommendations were reviewed. Signs and Symptoms of Post Partum Depression were reviewed. The patient is to call if any occur. Signs and symptoms of Mastitis were reviewed. The patient is to call if any occur for follow up.   Discharge instructions including pelvic rest, no driving with pain medicine and office follow-up were reviewed with patient     Attending Physician: Dr. Ana Kirby DO  Ob/Gyn Resident   10/5/2020, 6:47 AM

## 2020-10-05 NOTE — ED NOTES
Pt resting on cot with eyes closed, RR even and unlabored, NAD, call light in reach     Elysia , TONY  10/05/20 8534

## 2020-10-05 NOTE — ED PROVIDER NOTES
FACULTY SIGN-OUT  ADDENDUM     Care of this patient was assumed from previous attending physician. The patient's initial evaluation and plan have been discussed with the prior provider who initially evaluated the patient. Attestation  I was available and discussed any additional care issues that arose and coordinated the management plans with the resident(s) caring for the patient during my duty period. Any areas of disagreement with resident's documentation of care or procedures are noted on the chart. I was personally present for the key portions of any/all procedures, during my duty period. I have documented in the chart those procedures where I was not present during the key portions. ED COURSE      The patient was given the following medications:  Orders Placed This Encounter   Medications    lactated ringers bolus    meropenem (MERREM) 1 g in sodium chloride 0.9 % 100 mL IVPB (mini-bag)     Order Specific Question:   Antimicrobial Indications     Answer: Other     Order Specific Question:   Other Abx Indication     Answer: Suspected Sepsis of Abdominal Origin    iopamidol (ISOVUE-370) 76 % injection 75 mL       RECENT VITALS:   Temp: 98.1 °F (36.7 °C), Pulse: 94, Resp: 8, BP: 102/65    MEDICAL DECISION MAKING        Phoebe Garcia is a 30 y.o. female who presents to the Emergency Department with complaints of fever chills hx of endometriris      Abbey Campbell MD, F.A.C.E.P.   Attending Emergency Physician    (Please note that portions of this note were completed with a voice recognition program.  Efforts were made to edit the dictations but occasionally words are mis-transcribed.)        Abbey Campbell MD  10/04/20 9024

## 2020-10-05 NOTE — DISCHARGE SUMMARY
Obstetric Discharge Summary  [address] [facility]    Patient Name: Kya Kramer  Patient : 1990  Primary Care Physician: Amanda Foley, APRN - CNP  Admit Date: 10/4/2020    Principal Diagnosis: Leukocytosis, subjective fever    Her pregnancy has been complicated by:   Patient Active Problem List   Diagnosis    Herpes genitalis in women    History of depression    Anxiety     20 F APG9,9 Wt7-4    Postpartum endometritis    Tachycardia    Sepsis ([address]    Septicemia ([address]    Fever       Infection Present?: Yes- suspected. Unknown source. Recent hx of endometritis. Hospital Acquired: No      Consultations: none    Pertinent Findings & Procedures:   Kya Kramer is a 30 y.o. female [id number] at admitted for management of leukocytosis and subjective fever. Patient has history of recent vaginal delivery (1406) complicated by postpartum endometritis. She received adequate treatment with 24 hours of gentamicin/clindamycin IV x24h for endometritis on  and . Patient was readmitted on   for abdominal pain and tachycardia and was ultimately discharged on amoxicillin and flagyl PO x14 days. Patient was noncompliant with antibiotics. On this admission, she presented with subjective fevers, leukocytosis, and abdominal pain. She was started on Meropenem by the ED due to suspected SIRS and patient reported rash with amoxicillin and flagyl. CT Abd/Pelvis showed postpartum appearance of the uterus with fluid identified within the endometrial cavity which is a . She received Meropenum x 2 doses. COVID-19 was tested and was negative. CXR (10/5): Hazy opacification infrahilar R lower lung field possibly artifactual as appearance is similar to prior exam though possibility of developing infectious/inflammatory process not excluded. CT Abd/Pelvis (10/4): PP appearance of the uterus with fluid identified within the endometrial cavity- nonspecific finding.  No loculated fluid collections to suggest abscess formation. Patient remained afebrile and leukocytosis improved. She was discharged home on 10/6/20. Postpartum course: abnormal  complicated by PP endometritis. Course of patient: complicated by PP endometritis    Discharge to: Home    Readmission planned: no     Recommendations on Discharge:     Medications:      Medication List      CONTINUE taking these medications    acetaminophen 500 MG tablet  Commonly known as:  TYLENOL     amoxicillin 500 MG capsule  Commonly known as:  AMOXIL  Take 1 capsule by mouth every 8 hours for 12 days     docusate sodium 100 MG capsule  Commonly known as:  Colace  Take 1 capsule by mouth 2 times daily as needed for Constipation     ibuprofen 600 MG tablet  Commonly known as:  ADVIL;MOTRIN  Take 1 tablet by mouth every 6 hours as needed for Pain     metroNIDAZOLE 500 MG tablet  Commonly known as:  FLAGYL  Take 1 tablet by mouth every 8 hours for 12 days     ondansetron 4 MG tablet  Commonly known as:  Zofran  Take 1 tablet by mouth every 8 hours as needed for Nausea or Vomiting     phenazopyridine 100 MG tablet  Commonly known as:  Pyridium  Take 1 tablet by mouth 3 times daily as needed for Pain     PRENATAL VITAMIN PO     Tums E-X 750 750 MG chewable tablet  Generic drug:  calcium carbonate     valACYclovir 500 MG tablet  Commonly known as:  Valtrex  Take 1 tablet by mouth 2 times daily            Activity: pelvic rest x 4 weeks,  no lifting greater than 15 lbs  Diet: regular diet  Follow up: 2 weeks     Condition on discharge: stable    Discharge date: 10/6/2020    Brannon Tee DO  Ob/Gyn Resident    Comments:  Home care and follow-up care were reviewed. Pelvic rest, and birth control were reviewed. Signs and symptoms of mastitis and post partum depression were reviewed. The patient is to notify her physician if any of these occur.  The patient was counseled on secondary smoke risks and the increased risk of sudden infant death syndrome and respiratory problems to her baby with exposure. She was counseled on various alternate recommendations to decrease the exposure to secondary smoke to her children.

## 2020-10-06 VITALS
RESPIRATION RATE: 20 BRPM | OXYGEN SATURATION: 97 % | DIASTOLIC BLOOD PRESSURE: 59 MMHG | SYSTOLIC BLOOD PRESSURE: 103 MMHG | HEART RATE: 91 BPM | TEMPERATURE: 98.1 F | HEIGHT: 63 IN | WEIGHT: 170.42 LBS | BODY MASS INDEX: 30.2 KG/M2

## 2020-10-06 LAB
CULTURE: NORMAL
Lab: NORMAL
SPECIMEN DESCRIPTION: NORMAL

## 2020-10-06 PROCEDURE — 2580000003 HC RX 258: Performed by: STUDENT IN AN ORGANIZED HEALTH CARE EDUCATION/TRAINING PROGRAM

## 2020-10-06 PROCEDURE — 6370000000 HC RX 637 (ALT 250 FOR IP): Performed by: STUDENT IN AN ORGANIZED HEALTH CARE EDUCATION/TRAINING PROGRAM

## 2020-10-06 RX ADMIN — DOCUSATE SODIUM 100 MG: 100 CAPSULE, LIQUID FILLED ORAL at 09:41

## 2020-10-06 RX ADMIN — SODIUM CHLORIDE: 9 INJECTION, SOLUTION INTRAVENOUS at 09:46

## 2020-10-06 RX ADMIN — Medication 1 TABLET: at 09:42

## 2020-10-06 RX ADMIN — FERROUS SULFATE TAB EC 325 MG (65 MG FE EQUIVALENT) 325 MG: 325 (65 FE) TABLET DELAYED RESPONSE at 09:42

## 2020-10-06 RX ADMIN — IBUPROFEN 600 MG: 600 TABLET, FILM COATED ORAL at 09:42

## 2020-10-06 ASSESSMENT — PAIN SCALES - GENERAL: PAINLEVEL_OUTOF10: 5

## 2020-10-06 NOTE — PROGRESS NOTES
OB/GYN Rounding Note  POST PARTUM DAY # 23    Juventino Youssef is a 30 y.o. female  This patient was seen & examined today.  20 readmitted for SIRS possibly 2/2 postpartum endometritis    Her pregnancy was complicated by:   Patient Active Problem List   Diagnosis    Herpes genitalis in women    History of depression    Anxiety     20 F APG9,9 Wt7-4    Postpartum endometritis    Tachycardia    Sepsis ([address]    Septicemia ([address]    Fever       Today she is doing well. She does complain of some pelvic cramping that is intermittent and mostly relieved with Motrin. Her lochia is light. She denies chest pain, shortness of breath, headache, lightheadedness and blurred vision. She is breast pumping and she denies any breast tenderness. She is ambulating well. Her voiding pattern is normal. I reviewed signs and symptoms of post partum depression with the patient, she currently denies any of these symptoms. She is tolerating solids.      Vital Signs:  Vitals:    10/05/20 0200 10/05/20 0204 10/05/20 0357 10/05/20 0400   BP: (!) 105/55 (!) 105/55 (!) 105/59 (!) 105/59   Pulse: 98 97 96 91   Resp:    Temp:  98.8 °F (37.1 °C) 99.3 °F (37.4 °C)    TempSrc:  Oral Oral    SpO2: 96%  96% 97%   Weight: 170 lb 6.7 oz (77.3 kg) 170 lb 6.7 oz (77.3 kg)     Height:             Physical Exam:  General:  no apparent distress, alert and cooperative  Neurologic:  alert, oriented, normal speech, no focal findings or movement disorder noted  Lungs:  No increased work of breathing, good air exchange, clear to auscultation bilaterally, no crackles or wheezing  Heart:  Normal apical impulse, regular rate and rhythm, normal S1 and S2, no S3 or S4, and no murmur noted    Abdomen: abdomen soft, non-distended, mild diffuse tenderness to palpation, no fundal tenderness, no rebound, no rigidity, no guarding, no CVA tenderness  Fundus: non-tender, firm, below umbilicus  Extremities:  no calf tenderness, non edematous    Lab:  Lab Results   Component Value Date    HGB 9.0 (L) 10/05/2020     Lab Results   Component Value Date    HCT 30.9 (L) 10/05/2020       Assessment/Plan:  1. Meet Stewart is a E2G1945 PPD # 23 s/p  admitted for observation due to suspected SIRS (leukocytosis + HR >90) with recent Hx of PP endometritis   - Doing well, VSS, afebrile   - Minimal vaginal bleeding   - Continue PNV and Iron supplementation.    - Encourage ambulation, continue postpartum restrictions   - UCx and GC/C pending   - Blood culture x2 NTD   - Lactate wnl              - CT abd/pelvis wnl for postpartum uterus, no loculated fluid collections to suggest abscess formation   - CXR (10/5): Hazy opacification infrahilar R lower lung field possibly artifactual as appearance is similar to prior exam though possibility of developing infectious/inflammatory process not excluded. - COVID-19 neg              - S/p Clinda/Gent x24h ()              - PO Amox/Flagyl q8h x14d - noncompliant              - S/p 1g Meropenem x2               - NS @ 125mL/hr              - Motrin/Tylenol/Colace ordered              - Continue to monitor closely. Anticipate discharge home today  2. Breast pumping   - Denies s/s of mastitis  3. Leukocytosis   - 24.4 on admission now resolved at 14.9 on 10/5   - S/p Meropenem x2   - No source of infection at this time. Leukocytosis is often reactive in postpartum period. 4. Hypokalemia (resolved)   - K 3.3 on admission              - Received KCl 40mEq              - K increased to 3.9 on 10/5  5. Anemia              - VSS, clinically asymptomatic              - Hgb 9.6 on admission, decreased to 9.0              - Continue iron supplementation   6. Hx of Depression/Anxiety   - No meds   - Mood stable    - Denies SI/HI      Counseling Completed:  Secondary Smoke risks and Sudden Infant Death Syndrome were reviewed with recommendations.  Infant sleeping, \"back to sleep\" and avoidance of co-sleeping recommendations were reviewed. Signs and Symptoms of Post Partum Depression were reviewed. The patient is to call if any occur. Signs and symptoms of Mastitis were reviewed. The patient is to call if any occur for follow up.   Discharge instructions including pelvic rest, no driving with pain medicine and office follow-up were reviewed with patient     Attending Physician: Dr. Cierra Caruso and Candace Martinez Dr, DO  Ob/Gyn Resident   10/6/2020, 4:09 AM

## 2020-10-06 NOTE — CARE COORDINATION
Discharge [facility] Case Management Department  Written by: aKrla Sellers RN    Patient Name: Shane Enaelvin  Attending Provider: No att. providers found  Admit Date: 10/4/2020  7:45 PM  MRN: [id number]  Account: [de-identified]                     : 1990  Discharge Date: 10/6/2020      Disposition: home    Karla Sellers RN

## 2020-10-07 LAB
C TRACH DNA GENITAL QL NAA+PROBE: NEGATIVE
CULTURE: ABNORMAL
Lab: ABNORMAL
N. GONORRHOEAE DNA: NEGATIVE
SPECIMEN DESCRIPTION: ABNORMAL
SPECIMEN DESCRIPTION: NORMAL

## 2020-10-09 ENCOUNTER — TELEMEDICINE (OUTPATIENT)
Dept: FAMILY MEDICINE CLINIC | Age: 30
End: 2020-10-09
Payer: COMMERCIAL

## 2020-10-09 PROCEDURE — 1111F DSCHRG MED/CURRENT MED MERGE: CPT | Performed by: NURSE PRACTITIONER

## 2020-10-09 PROCEDURE — 99214 OFFICE O/P EST MOD 30 MIN: CPT | Performed by: NURSE PRACTITIONER

## 2020-10-09 ASSESSMENT — ENCOUNTER SYMPTOMS
SHORTNESS OF BREATH: 0
COUGH: 0

## 2020-10-09 NOTE — PROGRESS NOTES
Post-Discharge Transitional Care Management Services or Hospital Follow Up      TELEHEALTH EVALUATION -- Audio/Visual (During YQBDC-25 public health emergency)  Phoebe Garcia (:  1990) has requested an audio/video evaluation for the following concern(s):      Phoebe Garcia   YOB: 1990    Date of Office Visit:  10/9/2020  Date of Hospital Admission: 10/4/20  Date of Hospital Discharge: 10/6/20  Readmission Risk Score(high >=14%. Medium >=10%):Readmission Risk Score: 8      Care management risk score Rising risk (score 2-5) and Complex Care (Scores >=6): 1     Non face to face  following discharge, date last encounter closed (first attempt may have been earlier): *No documented post hospital discharge outreach found in the last 14 days *No documented post hospital discharge outreach found in the last 14 days    Call initiated 2 business days of discharge: *No response recorded in the last 14 days     Patient Active Problem List   Diagnosis    Herpes genitalis in women    History of depression    Anxiety     20 F APG9,9 Wt7-4    Postpartum endometritis    Tachycardia    Sepsis ([address]    Septicemia ([address]    Fever       Allergies   Allergen Reactions    Other Rash     Pt states there is an ADHD medication she is allergic to but could not remember the name.         Medications listed as ordered at the time of discharge from hospital   Tiffanie Denise [address] Medication Instructions JEVON:    Printed on:10/09/20 5859   Medication Information                      acetaminophen (TYLENOL) 500 MG tablet  Take 500 mg by mouth every 6 hours as needed for Pain             amoxicillin (AMOXIL) 500 MG capsule  Take 1 capsule by mouth every 8 hours for 12 days             calcium carbonate (TUMS E-X 750) 750 MG chewable tablet  Take 2 tablets by mouth daily Indications: \"heartburn\"             docusate sodium (COLACE) 100 MG capsule  Take 1 capsule by mouth 2 times daily as needed for Constipation             ibuprofen (ADVIL;MOTRIN) 600 MG tablet  Take 1 tablet by mouth every 6 hours as needed for Pain             metroNIDAZOLE (FLAGYL) 500 MG tablet  Take 1 tablet by mouth every 8 hours for 12 days             ondansetron (ZOFRAN) 4 MG tablet  Take 1 tablet by mouth every 8 hours as needed for Nausea or Vomiting             phenazopyridine (PYRIDIUM) 100 MG tablet  Take 1 tablet by mouth 3 times daily as needed for Pain             Prenatal Vit-Fe Fumarate-FA (PRENATAL VITAMIN PO)  Take 1 capsule by mouth daily             valACYclovir (VALTREX) 500 MG tablet  Take 1 tablet by mouth 2 times daily                   Medications marked \"taking\" at this time  Outpatient Medications Marked as Taking for the 10/9/20 encounter (Telemedicine) with DENNY Lopez CNP   Medication Sig Dispense Refill    metroNIDAZOLE (FLAGYL) 500 MG tablet Take 1 tablet by mouth every 8 hours for 12 days 36 tablet 0    amoxicillin (AMOXIL) 500 MG capsule Take 1 capsule by mouth every 8 hours for 12 days 36 capsule 0    ibuprofen (ADVIL;MOTRIN) 600 MG tablet Take 1 tablet by mouth every 6 hours as needed for Pain 40 tablet 0    docusate sodium (COLACE) 100 MG capsule Take 1 capsule by mouth 2 times daily as needed for Constipation 60 capsule 0    valACYclovir (VALTREX) 500 MG tablet Take 1 tablet by mouth 2 times daily 60 tablet 1    acetaminophen (TYLENOL) 500 MG tablet Take 500 mg by mouth every 6 hours as needed for Pain      calcium carbonate (TUMS E-X 750) 750 MG chewable tablet Take 2 tablets by mouth daily Indications: \"heartburn\"      ondansetron (ZOFRAN) 4 MG tablet Take 1 tablet by mouth every 8 hours as needed for Nausea or Vomiting 15 tablet 0    phenazopyridine (PYRIDIUM) 100 MG tablet Take 1 tablet by mouth 3 times daily as needed for Pain 10 tablet 0    Prenatal Vit-Fe Fumarate-FA (PRENATAL VITAMIN PO) Take 1 capsule by mouth daily          Medications patient taking as of now reconciled bleeding. Negative for difficulty urinating. Skin: Negative. Negative for rash. Patient-Reported Vitals 10/9/2020   Patient-Reported Weight 160lb   Patient-Reported Height 5 3      There were no vitals filed for this visit. There is no height or weight on file to calculate BMI. Wt Readings from Last 3 Encounters:   10/05/20 170 lb 6.7 oz (77.3 kg)   09/27/20 163 lb (73.9 kg)   09/24/20 165 lb (74.8 kg)     BP Readings from Last 3 Encounters:   10/06/20 (!) 103/59   09/28/20 120/77   09/26/20 118/64       Physical Exam  Vitals signs reviewed. Constitutional:       General: She is not in acute distress. Appearance: Normal appearance. She is well-developed and well-groomed. She is not ill-appearing, toxic-appearing or diaphoretic. HENT:      Head: Normocephalic. Right Ear: Hearing normal.      Left Ear: Hearing normal.      Mouth/Throat:      Lips: Pink. Eyes:      General: Lids are normal.      Conjunctiva/sclera: Conjunctivae normal.   Neck:      Musculoskeletal: Normal range of motion. Pulmonary:      Effort: Pulmonary effort is normal. No respiratory distress. Musculoskeletal: Normal range of motion. Skin:     Findings: No rash. Neurological:      Mental Status: She is alert and oriented to person, place, and time. Mental status is at baseline. Coordination: Coordination is intact. Psychiatric:         Mood and Affect: Mood normal.         Behavior: Behavior normal. Behavior is cooperative. Thought Content: Thought content normal.         Judgment: Judgment normal.       Assessment/Plan:  1. Postpartum endometritis    - [place] DISCHARGE MEDS RECONCILED W/ CURRENT OUTPATIENT MED LIST  -[place] [address] [place] [place] to discuss patient case given continued pelvic pain. -Decision made to continue antibiotics through the weekend as patient has not completed a full course of antibiotics. -Patient was advised to see Dr. Elvie Aquino early next week for follow-up on endometritis. Robert Rosenbaum is a 35 y.o. female being evaluated by a Virtual Visit (video visit) encounter to address concerns as mentioned above. A caregiver was present when appropriate. Due to this being a TeleHealth encounter ([facility]), evaluation of the following organ systems was limited: Vitals/Constitutional/EENT/Resp/CV/GI//MS/Neuro/Skin/Heme-Lymph-Imm. Pursuant to the emergency declaration under the [address] [place] [address] and the [employer] and [employer] Act, this Virtual Visit was conducted with patient's (and/or legal guardian's) consent, to reduce the patient's risk of exposure to COVID-19 and provide necessary medical care. The patient (and/or legal guardian) has also been advised to contact this office for worsening conditions or problems, and seek emergency medical treatment and/or call 911 if deemed necessary. Services were provided through a video synchronous discussion virtually to substitute for in-person clinic visit. Patient and provider were located at their individual homes.     --DENNY Cesar - CNP on 10/9/2020 at 4:09 PM    (Please note that portions of this note were completed with a voice recognition program. Efforts were made to edit the dictations but occasionally words are mis-transcribed.)      Medical Decision Making: moderate complexity

## 2020-10-13 ENCOUNTER — OFFICE VISIT (OUTPATIENT)
Dept: OBGYN CLINIC | Age: 30
End: 2020-10-13

## 2020-10-13 VITALS
HEART RATE: 93 BPM | WEIGHT: 156 LBS | SYSTOLIC BLOOD PRESSURE: 103 MMHG | DIASTOLIC BLOOD PRESSURE: 68 MMHG | BODY MASS INDEX: 26.63 KG/M2 | HEIGHT: 64 IN

## 2020-10-13 PROCEDURE — 1036F TOBACCO NON-USER: CPT | Performed by: ADVANCED PRACTICE MIDWIFE

## 2020-10-13 PROCEDURE — 99024 POSTOP FOLLOW-UP VISIT: CPT | Performed by: ADVANCED PRACTICE MIDWIFE

## 2020-10-13 PROCEDURE — G8427 DOCREV CUR MEDS BY ELIG CLIN: HCPCS | Performed by: ADVANCED PRACTICE MIDWIFE

## 2020-10-13 PROCEDURE — G8419 CALC BMI OUT NRM PARAM NOF/U: HCPCS | Performed by: ADVANCED PRACTICE MIDWIFE

## 2020-10-13 PROCEDURE — G8484 FLU IMMUNIZE NO ADMIN: HCPCS | Performed by: ADVANCED PRACTICE MIDWIFE

## 2020-10-13 PROCEDURE — 1111F DSCHRG MED/CURRENT MED MERGE: CPT | Performed by: ADVANCED PRACTICE MIDWIFE

## 2020-10-13 RX ORDER — METRONIDAZOLE 500 MG/1
500 TABLET ORAL 3 TIMES DAILY
COMMUNITY
End: 2021-01-25 | Stop reason: ALTCHOICE

## 2020-10-13 NOTE — PROGRESS NOTES
Chief Complaint   Patient presents with    Postpartum Care     4 wks postpartum     Follow-Up from Hospital       SUBJECTIVE:    HPI:  DELIVERY DATE: 9/17/20  TYPE OF DELIVERY: normal spontaneous vaginal delivery  PROVIDER: SHEN Cerna  PERINEUM: intact    Manoj was seen for a 4 week postpartum visit. Her Female infant is healthy. She is pumping and providing breast milk with formula supplementation  She reports her lochia is light to moderate. She reports none perineal discomfort. She denies urinary incontinence. Her bowels have returned to her normal pattern. She is not back to her normal activity pattern. She has not had her first menses. Manoj has not engaged in intercourse. She does not report a mood disorder. She feels she is not having difficulty coping. Manoj feels her family adjustment is effective. She reports adequate support system. She reports an overall positive birth experience. Her Bartlett Score is 7. This score does match my assessment. She denies headache, vision changes, RUQ pain, epigastric pain, and swelling. Manoj reports she is still experiencing lower abdominal pain that is intermittent and sharp, radiating up to her umbilicus. Is not sure what makes it worse. Denies foul smelling lochia. Some relief with motrin 400 mg and Tylenol 1000 mg alternating, last dose this AM. She reports she checks her temperature at least 3 times per day, sometimes getting reading 99.9 F but usually lower. Some chills when at 99.9 F but none otherwise. Is still feeling very tired especially with multiple hospital visits. Has 3 days left of Flagyl. Wants to reschedule her surgery with Dr. Vasile Ruby due to not feeling well. Denies burning/frequency with urination. Denies constipation or bowel issues. See Objective for details of hospital visits.      OBJECTIVE:   Wt Readings from Last 3 Encounters:   10/13/20 156 lb (70.8 kg)   10/05/20 170 lb 6.7 oz (77.3 kg)   20 163 lb (73.9 kg)       Vitals:    10/13/20 1020 10/13/20 1021   BP:  103/68   Site:  Right Upper Arm   Position:  Sitting   Cuff Size:  Medium Adult   Pulse:  93   Weight: 156 lb (70.8 kg) 156 lb (70.8 kg)   Height:  5' 3.6\" (1.615 m)      Breast exam: declined  Abdomen: Soft, tender to palpation of lower abdomen, no guarding. There is diastasis present. The diastasis is 1 fingerbreadth of separation. Perineum: not inspected  Extremities: No calf tenderness bilaterally. No edema. 20:  with no complications  98: readmitted for postpartum endometritis and septicemia. COVID-19 negative. Given 24-hours of gent/clinda IV.   20: Went to [facility] for fevers, pain, and elevated heart rate. EKG sinus tach, CXR normal. CT PE and trop x 2 normal. [facility]. Given Amoxicillin PO  and Flagyl PO TID x 2 weeks. 10/4/20Kyra Soho to [facility] ER reporting fever, concerned reaction from amoxicillin. Amoxicillin D/C and flagyl continued. Given meropenem IV. CT abdomen/pelvis with fluid identified in the endometrial cavity. Repeat COVID-19 testing negative. Blood cultures negative x 2. GC/CT and Affirm negative. Genital culture negative. Improved leukocytosis. Discharged 10/6/20.   10/9/20: Gabe Benjamin PCP virtually, consulted with Carlito Castle CNM- plan to continue Flagyl until gone and follow up with Dr. Buddy Camp. ASSESSMENT/PLAN:    4 week postpartum visit  · Labs were not ordered. · Date of last pap: 19  · She will return for 6 week postpartum visit   · Discussed s/s of mood changes indicating postpartum depression to report. Pumping  · Signs & Symptoms of mastitis reviewed; notify if occur. Non-smoker:   Secondary smoke risks were reviewed, including increased risks of respiratory problems, Sudden Infant Death Syndrome, and potential malignancies.  Smoking cessation counseling:  n/a  Family planning needs   Family planning counseling was completed.      has vasectomy    Follow-up examination  · Afebrile today, no reported fevers besides rare low grade 99.9 F. Reviewed can take Motrin 800 mg q8h and Tylenol 1 g q8h for pain. Still has abdominal tenderness on exam, denies foul smelling lochia. Discussed case with Dr. Leona Bush at time of visit given multiple readmissions and several work ups. Recommends she finish her Flagyl RX and follow up if symptoms worsen. No imaging or repeat blood work recommended. Reviewed with Dr. Leona Bush that pt plans to reschedule her surgery with her but she will call to confirm. · Recommended RTO for 6 week postpartum appointment    Postpartum complication  · See Objective for details of readmissions for postpartum endometritis, and above for Dr. Andriy Rodríguez recommendations. Return in about 2 weeks (around 10/27/2020) for Postpartum visit. Patient was seen with total face to face time of 15 minutes. More than 50% of this visit was counseling and education regarding her Post Partum visit and all of the above.     Electronically signed by: DENNY Anderson CNM

## 2020-10-19 ENCOUNTER — TELEPHONE (OUTPATIENT)
Dept: OBGYN CLINIC | Age: 30
End: 2020-10-19

## 2020-10-21 ENCOUNTER — TELEPHONE (OUTPATIENT)
Dept: FAMILY MEDICINE CLINIC | Age: 30
End: 2020-10-21

## 2020-10-21 NOTE — TELEPHONE ENCOUNTER
Patient is calling stating that she is having cold chills, back pain, and breast pain with a fever of 100.5.    Pt states that she called called the mid wife that delivered her baby 9/17/2020 and got nowhere with her   Pt is asking if she should be put on an antibiotic

## 2020-10-21 NOTE — TELEPHONE ENCOUNTER
Called and spoke with pt she stated that it is mostly her right breast and it is tender and hot. She stated that she has been doing tylenol and motrin plus resting and drinking water. She stated that her temp is now 101. 4. her symptoms has been for the past couple of hours

## 2020-10-21 NOTE — TELEPHONE ENCOUNTER
Is she feeding the baby or pumping? She needs to do that frequently to help. Also massage and a hot shower will help her symptoms. Warm, moist compresses will aid in pain relief. We typically do not treat with antibiotics until you have had symptoms for at minimum 12-24 hours. Please advise her to do these things and if things do not improve call office in the morning and I will send in an antibiotic ASAP.   Mastitis can be viral or bacterial.  If it has only been a few hours it is too soon to see which one this is

## 2020-10-22 RX ORDER — DOXYCYCLINE HYCLATE 100 MG
100 TABLET ORAL 2 TIMES DAILY
Qty: 20 TABLET | Refills: 0 | Status: SHIPPED | OUTPATIENT
Start: 2020-10-22 | End: 2020-11-01

## 2020-10-22 NOTE — TELEPHONE ENCOUNTER
Doxycycline called into pharmacy.       Please advise patient to keep following instructions as discussed yesterday with frequent feeding/pumping, massage, warm moist compresses, over-the-counter fever reducers, increase fluids and rest

## 2020-10-28 ENCOUNTER — TELEPHONE (OUTPATIENT)
Dept: FAMILY MEDICINE CLINIC | Age: 30
End: 2020-10-28

## 2020-10-28 NOTE — TELEPHONE ENCOUNTER
Patient called and left a message stating that she has been on an antibiotic for a few days and her cold chills are back

## 2020-10-28 NOTE — TELEPHONE ENCOUNTER
Is she having any other sx with her mastitis of abdominal pain as previous? Did she start feeling better with the mastitis? Any fevers?

## 2021-01-25 ENCOUNTER — HOSPITAL ENCOUNTER (OUTPATIENT)
Age: 31
Setting detail: SPECIMEN
Discharge: HOME | End: 2021-01-25
Payer: COMMERCIAL

## 2021-01-25 ENCOUNTER — OFFICE VISIT (OUTPATIENT)
Dept: FAMILY MEDICINE CLINIC | Age: 31
End: 2021-01-25
Payer: COMMERCIAL

## 2021-01-25 VITALS
WEIGHT: 139.4 LBS | SYSTOLIC BLOOD PRESSURE: 110 MMHG | RESPIRATION RATE: 18 BRPM | BODY MASS INDEX: 23.8 KG/M2 | OXYGEN SATURATION: 98 % | TEMPERATURE: 97.2 F | HEART RATE: 71 BPM | HEIGHT: 64 IN | DIASTOLIC BLOOD PRESSURE: 68 MMHG

## 2021-01-25 DIAGNOSIS — Z00.00 ROUTINE PHYSICAL EXAMINATION: Primary | ICD-10-CM

## 2021-01-25 DIAGNOSIS — R35.0 URINE FREQUENCY: ICD-10-CM

## 2021-01-25 DIAGNOSIS — F41.9 ANXIETY: ICD-10-CM

## 2021-01-25 DIAGNOSIS — F32.89 OTHER DEPRESSION: ICD-10-CM

## 2021-01-25 LAB
-: ABNORMAL
AMORPHOUS: ABNORMAL
BACTERIA: ABNORMAL
BILIRUBIN URINE: NEGATIVE
CASTS UA: ABNORMAL /LPF (ref 0–8)
COLOR: YELLOW
CRYSTALS, UA: ABNORMAL /HPF
EPITHELIAL CELLS UA: ABNORMAL /HPF (ref 0–5)
GLUCOSE URINE: NEGATIVE
KETONES, URINE: NEGATIVE
LEUKOCYTE ESTERASE, URINE: NEGATIVE
MUCUS: ABNORMAL
NITRITE, URINE: NEGATIVE
OTHER OBSERVATIONS UA: ABNORMAL
PH UA: 6.5 (ref 5–8)
PROTEIN UA: NEGATIVE
RBC UA: ABNORMAL /HPF (ref 0–4)
RENAL EPITHELIAL, UA: ABNORMAL /HPF
SPECIFIC GRAVITY UA: 1.03 (ref 1–1.03)
TRICHOMONAS: ABNORMAL
TURBIDITY: CLEAR
URINE HGB: NEGATIVE
UROBILINOGEN, URINE: NORMAL
WBC UA: ABNORMAL /HPF (ref 0–5)
YEAST: ABNORMAL

## 2021-01-25 PROCEDURE — 99395 PREV VISIT EST AGE 18-39: CPT | Performed by: NURSE PRACTITIONER

## 2021-01-25 PROCEDURE — G8484 FLU IMMUNIZE NO ADMIN: HCPCS | Performed by: NURSE PRACTITIONER

## 2021-01-25 ASSESSMENT — ENCOUNTER SYMPTOMS
CONSTIPATION: 0
EYES NEGATIVE: 1
GASTROINTESTINAL NEGATIVE: 1
RESPIRATORY NEGATIVE: 1
VOMITING: 0
COUGH: 0
NAUSEA: 0
BACK PAIN: 0
DIARRHEA: 0
ABDOMINAL PAIN: 0
SHORTNESS OF BREATH: 0

## 2021-01-25 ASSESSMENT — PATIENT HEALTH QUESTIONNAIRE - PHQ9
SUM OF ALL RESPONSES TO PHQ9 QUESTIONS 1 & 2: 0
SUM OF ALL RESPONSES TO PHQ QUESTIONS 1-9: 0
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
## 2021-01-25 NOTE — PROGRESS NOTES
Depression screening done  Chief Complaint   Patient presents with    Annual Exam     for insurance

## 2021-01-25 NOTE — ASSESSMENT & PLAN NOTE
Chronic. Stable without medication. Patient advised to continue home remedies and encouraged to incorporate self-care as additional treatment for anxiety and depression.

## 2021-01-26 LAB
CULTURE: NORMAL
Lab: NORMAL
SPECIMEN DESCRIPTION: NORMAL

## 2021-04-13 ENCOUNTER — HOSPITAL ENCOUNTER (OUTPATIENT)
Age: 31
Setting detail: SPECIMEN
Discharge: HOME | End: 2021-04-13
Payer: COMMERCIAL

## 2021-04-13 ENCOUNTER — OFFICE VISIT (OUTPATIENT)
Dept: FAMILY MEDICINE CLINIC | Age: 31
End: 2021-04-13
Payer: COMMERCIAL

## 2021-04-13 VITALS
WEIGHT: 133.8 LBS | SYSTOLIC BLOOD PRESSURE: 110 MMHG | OXYGEN SATURATION: 99 % | DIASTOLIC BLOOD PRESSURE: 70 MMHG | HEART RATE: 88 BPM | BODY MASS INDEX: 23.26 KG/M2

## 2021-04-13 DIAGNOSIS — R35.0 URINARY FREQUENCY: ICD-10-CM

## 2021-04-13 DIAGNOSIS — N89.8 VAGINAL DISCHARGE: ICD-10-CM

## 2021-04-13 DIAGNOSIS — R10.2 VAGINAL PAIN: Primary | ICD-10-CM

## 2021-04-13 DIAGNOSIS — N93.9 ABNORMAL UTERINE BLEEDING: ICD-10-CM

## 2021-04-13 DIAGNOSIS — R10.2 VAGINAL PAIN: ICD-10-CM

## 2021-04-13 DIAGNOSIS — R10.9 RIGHT SIDED ABDOMINAL PAIN: ICD-10-CM

## 2021-04-13 DIAGNOSIS — R10.32 LLQ ABDOMINAL PAIN: ICD-10-CM

## 2021-04-13 LAB
-: ABNORMAL
AMORPHOUS: ABNORMAL
BACTERIA: ABNORMAL
BILIRUBIN URINE: NEGATIVE
CASTS UA: ABNORMAL /LPF (ref 0–2)
COLOR: ABNORMAL
COMMENT UA: ABNORMAL
CRYSTALS, UA: ABNORMAL /HPF
DIRECT EXAM: NORMAL
EPITHELIAL CELLS UA: ABNORMAL /HPF (ref 0–5)
GLUCOSE URINE: NEGATIVE
KETONES, URINE: ABNORMAL
LEUKOCYTE ESTERASE, URINE: ABNORMAL
Lab: NORMAL
MUCUS: ABNORMAL
NITRITE, URINE: NEGATIVE
OTHER OBSERVATIONS UA: ABNORMAL
PH UA: 5.5 (ref 5–8)
PROTEIN UA: ABNORMAL
RBC UA: ABNORMAL /HPF (ref 0–2)
RENAL EPITHELIAL, UA: ABNORMAL /HPF
SPECIFIC GRAVITY UA: 1.03 (ref 1–1.03)
SPECIMEN DESCRIPTION: NORMAL
TRICHOMONAS: ABNORMAL
TURBIDITY: ABNORMAL
URINE HGB: ABNORMAL
UROBILINOGEN, URINE: NORMAL
WBC UA: ABNORMAL /HPF (ref 0–5)
YEAST: ABNORMAL

## 2021-04-13 PROCEDURE — 1036F TOBACCO NON-USER: CPT | Performed by: NURSE PRACTITIONER

## 2021-04-13 PROCEDURE — G8427 DOCREV CUR MEDS BY ELIG CLIN: HCPCS | Performed by: NURSE PRACTITIONER

## 2021-04-13 PROCEDURE — G8420 CALC BMI NORM PARAMETERS: HCPCS | Performed by: NURSE PRACTITIONER

## 2021-04-13 PROCEDURE — 99213 OFFICE O/P EST LOW 20 MIN: CPT | Performed by: NURSE PRACTITIONER

## 2021-04-13 ASSESSMENT — ENCOUNTER SYMPTOMS
ABDOMINAL PAIN: 1
DIARRHEA: 1
NAUSEA: 0
CONSTIPATION: 0

## 2021-04-13 NOTE — ASSESSMENT & PLAN NOTE
Unclear control, continue current plan pending work up below and continue to monitor for recurrence of bleeding.   Will refer back to OB/GYN if symptoms continue

## 2021-04-13 NOTE — ASSESSMENT & PLAN NOTE
Although postpartum endometritis had resolved will check for signs and symptoms of infection with pelvic pain given her history

## 2021-04-13 NOTE — ASSESSMENT & PLAN NOTE
Uncontrolled, continue current plan pending work up below.   Will await results prior to treating patient

## 2021-04-13 NOTE — ASSESSMENT & PLAN NOTE
Uncontrolled, continue current plan pending work up below to include over-the-counter pain relievers, heating pad and monitoring for signs and symptoms of infection
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
## 2021-04-15 ENCOUNTER — HOSPITAL ENCOUNTER (OUTPATIENT)
Dept: ULTRASOUND IMAGING | Facility: CLINIC | Age: 31
Discharge: HOME | End: 2021-04-17
Payer: COMMERCIAL

## 2021-04-15 ENCOUNTER — HOSPITAL ENCOUNTER (OUTPATIENT)
Facility: CLINIC | Age: 31
Discharge: HOME | End: 2021-04-15
Payer: COMMERCIAL

## 2021-04-15 DIAGNOSIS — R10.32 LLQ ABDOMINAL PAIN: ICD-10-CM

## 2021-04-15 DIAGNOSIS — R10.9 RIGHT SIDED ABDOMINAL PAIN: ICD-10-CM

## 2021-04-15 DIAGNOSIS — R10.2 VAGINAL PAIN: ICD-10-CM

## 2021-04-15 DIAGNOSIS — N93.9 ABNORMAL UTERINE BLEEDING: ICD-10-CM

## 2021-04-15 LAB
ABSOLUTE EOS #: 0.52 K/UL (ref 0–0.44)
ABSOLUTE IMMATURE GRANULOCYTE: <0.03 K/UL (ref 0–0.3)
ABSOLUTE LYMPH #: 2.34 K/UL (ref 1.1–3.7)
ABSOLUTE MONO #: 0.36 K/UL (ref 0.1–1.2)
ALBUMIN SERPL-MCNC: 4.6 G/DL (ref 3.5–5.2)
ALBUMIN/GLOBULIN RATIO: 1.4 (ref 1–2.5)
ALP BLD-CCNC: 66 U/L (ref 35–104)
ALT SERPL-CCNC: 9 U/L (ref 5–33)
ANION GAP SERPL CALCULATED.3IONS-SCNC: 12 MMOL/L (ref 9–17)
AST SERPL-CCNC: 14 U/L
BASOPHILS # BLD: 1 % (ref 0–2)
BASOPHILS ABSOLUTE: 0.06 K/UL (ref 0–0.2)
BILIRUB SERPL-MCNC: 0.37 MG/DL (ref 0.3–1.2)
BUN BLDV-MCNC: 10 MG/DL (ref 6–20)
BUN/CREAT BLD: ABNORMAL (ref 9–20)
CALCIUM SERPL-MCNC: 9.4 MG/DL (ref 8.6–10.4)
CHLORIDE BLD-SCNC: 103 MMOL/L (ref 98–107)
CO2: 25 MMOL/L (ref 20–31)
CREAT SERPL-MCNC: 0.46 MG/DL (ref 0.5–0.9)
DIFFERENTIAL TYPE: ABNORMAL
EOSINOPHILS RELATIVE PERCENT: 9 % (ref 1–4)
GFR AFRICAN AMERICAN: >60 ML/MIN
GFR NON-AFRICAN AMERICAN: >60 ML/MIN
GFR SERPL CREATININE-BSD FRML MDRD: ABNORMAL ML/MIN/{1.73_M2}
GFR SERPL CREATININE-BSD FRML MDRD: ABNORMAL ML/MIN/{1.73_M2}
GLUCOSE BLD-MCNC: 91 MG/DL (ref 70–99)
HCT VFR BLD CALC: 42.3 % (ref 36.3–47.1)
HEMOGLOBIN: 13 G/DL (ref 11.9–15.1)
IMMATURE GRANULOCYTES: 0 %
LIPASE: 16 U/L (ref 13–60)
LYMPHOCYTES # BLD: 41 % (ref 24–43)
MCH RBC QN AUTO: 26.6 PG (ref 25.2–33.5)
MCHC RBC AUTO-ENTMCNC: 30.7 G/DL (ref 28.4–34.8)
MCV RBC AUTO: 86.7 FL (ref 82.6–102.9)
MONOCYTES # BLD: 6 % (ref 3–12)
NRBC AUTOMATED: 0 PER 100 WBC
PDW BLD-RTO: 14.1 % (ref 11.8–14.4)
PLATELET # BLD: 289 K/UL (ref 138–453)
PLATELET ESTIMATE: ABNORMAL
PMV BLD AUTO: 10.3 FL (ref 8.1–13.5)
POTASSIUM SERPL-SCNC: 4.7 MMOL/L (ref 3.7–5.3)
RBC # BLD: 4.88 M/UL (ref 3.95–5.11)
RBC # BLD: ABNORMAL 10*6/UL
SEG NEUTROPHILS: 43 % (ref 36–65)
SEGMENTED NEUTROPHILS ABSOLUTE COUNT: 2.46 K/UL (ref 1.5–8.1)
SODIUM BLD-SCNC: 140 MMOL/L (ref 135–144)
TOTAL PROTEIN: 7.9 G/DL (ref 6.4–8.3)
WBC # BLD: 5.7 K/UL (ref 3.5–11.3)
WBC # BLD: ABNORMAL 10*3/UL

## 2021-04-15 PROCEDURE — 85025 COMPLETE CBC W/AUTO DIFF WBC: CPT

## 2021-04-15 PROCEDURE — 36415 COLL VENOUS BLD VENIPUNCTURE: CPT

## 2021-04-15 PROCEDURE — 83690 ASSAY OF LIPASE: CPT

## 2021-04-15 PROCEDURE — 76830 TRANSVAGINAL US NON-OB: CPT

## 2021-04-15 PROCEDURE — 76705 ECHO EXAM OF ABDOMEN: CPT

## 2021-04-15 PROCEDURE — 80053 COMPREHEN METABOLIC PANEL: CPT

## 2021-04-19 ENCOUNTER — TELEPHONE (OUTPATIENT)
Dept: FAMILY MEDICINE CLINIC | Age: 31
End: 2021-04-19

## 2021-04-19 NOTE — TELEPHONE ENCOUNTER
When I spoke to Ye she said patient indicated he was feeling better. The gallstones were not causing any inflammation or signs of infection and therefore are not a problem currently. The cyst is small and does not appear to be contributory for her symptoms.   I will send a MyChart message to clarify to patient

## 2021-04-19 NOTE — TELEPHONE ENCOUNTER
Patient stated her body temp dropped to 96.2 after eating but she ate a bunch of things and doesn't know what caused it. She doesn't understand how you can say the gallstones and cyst aren't a issue when she's been having problems. Patient was extremely rude.

## 2021-04-19 NOTE — TELEPHONE ENCOUNTER
Patient called and wanted to know if you went over her test results.  Patient also wanted you to know her body temp dropped lower then it should have this weekend

## 2021-04-19 NOTE — TELEPHONE ENCOUNTER
She has a couple of possible gallstones, but do not seem to be causing any problems and a small left ovarian cyst that is not concerning. I did not receive any calls other than the note from earlier this morning. How is she feeling?   I did send her a message on the 15th with some of her results

## 2021-04-28 ENCOUNTER — OFFICE VISIT (OUTPATIENT)
Dept: OBGYN CLINIC | Age: 31
End: 2021-04-28
Payer: COMMERCIAL

## 2021-04-28 VITALS
WEIGHT: 136 LBS | BODY MASS INDEX: 24.1 KG/M2 | HEIGHT: 63 IN | DIASTOLIC BLOOD PRESSURE: 64 MMHG | SYSTOLIC BLOOD PRESSURE: 110 MMHG

## 2021-04-28 DIAGNOSIS — N39.46 MIXED STRESS AND URGE URINARY INCONTINENCE: ICD-10-CM

## 2021-04-28 DIAGNOSIS — N83.202 CYST OF LEFT OVARY: ICD-10-CM

## 2021-04-28 DIAGNOSIS — R10.2 PELVIC PAIN: Primary | ICD-10-CM

## 2021-04-28 PROCEDURE — G8427 DOCREV CUR MEDS BY ELIG CLIN: HCPCS | Performed by: OBSTETRICS & GYNECOLOGY

## 2021-04-28 PROCEDURE — 99213 OFFICE O/P EST LOW 20 MIN: CPT | Performed by: OBSTETRICS & GYNECOLOGY

## 2021-04-28 PROCEDURE — 1036F TOBACCO NON-USER: CPT | Performed by: OBSTETRICS & GYNECOLOGY

## 2021-04-28 PROCEDURE — G8420 CALC BMI NORM PARAMETERS: HCPCS | Performed by: OBSTETRICS & GYNECOLOGY

## 2021-04-28 ASSESSMENT — ENCOUNTER SYMPTOMS
SHORTNESS OF BREATH: 0
COUGH: 0
BACK PAIN: 0
ABDOMINAL PAIN: 0
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
## 2021-06-08 ENCOUNTER — TELEPHONE (OUTPATIENT)
Dept: FAMILY MEDICINE CLINIC | Age: 31
End: 2021-06-08

## 2021-06-08 DIAGNOSIS — R10.9 RIGHT SIDED ABDOMINAL PAIN: Primary | ICD-10-CM

## 2021-06-08 DIAGNOSIS — Z87.19 HISTORY OF GALLSTONES: ICD-10-CM

## 2021-06-09 NOTE — TELEPHONE ENCOUNTER
I would suggest she go to either of these options please ask her preference and then I will sign the orders     [address] [place] [address] Road ( any of the providers there)  [address] [facility], [address]  [place], [address] [facility]  [address] [facility] - [address] Danilo [place], [address]  [phone]

## 2021-06-14 ENCOUNTER — HOSPITAL ENCOUNTER (OUTPATIENT)
Dept: SURGERY | Age: 31
Discharge: HOME | End: 2021-06-14
Payer: COMMERCIAL

## 2021-06-14 VITALS — HEIGHT: 63 IN | BODY MASS INDEX: 23.57 KG/M2 | WEIGHT: 133 LBS

## 2021-06-14 PROCEDURE — 99202 OFFICE O/P NEW SF 15 MIN: CPT

## 2021-06-14 PROCEDURE — 99203 OFFICE O/P NEW LOW 30 MIN: CPT | Performed by: SURGERY
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
## 2021-06-18 DIAGNOSIS — A60.09 HERPES GENITALIS IN WOMEN: Chronic | ICD-10-CM

## 2021-06-20 RX ORDER — VALACYCLOVIR HYDROCHLORIDE 500 MG/1
500 TABLET, FILM COATED ORAL 2 TIMES DAILY
Qty: 60 TABLET | Refills: 1 | Status: SHIPPED | OUTPATIENT
Start: 2021-06-20

## 2021-06-28 ENCOUNTER — NURSE TRIAGE (OUTPATIENT)
Dept: OTHER | Facility: CLINIC | Age: 31
End: 2021-06-28

## 2021-06-28 NOTE — TELEPHONE ENCOUNTER
Received call from Zulema Pizano at [facility] with [employer]. Brief description of triage: hematuria, urinary frequency and urgency    Triage indicates for patient to see in office today. Care advice provided, patient verbalizes understanding; denies any other questions or concerns; instructed to call back for any new or worsening symptoms. [place] scheduling department to call patient to schedule appt. Patient informed and agreeable with plan. Pawel  acknowledge at [phone] at scheduling dept to call pt to set up appt. Attention Provider: Thank you for allowing me to participate in the care of your patient. The patient was connected to triage in response to information provided to the ECC. Please do not respond through this encounter as the response is not directed to a shared pool. Reason for Disposition   Side (flank) or lower back pain present    Answer Assessment - Initial Assessment Questions  1. SYMPTOM: \"What's the main symptom you're concerned about? \" (e.g., frequency, incontinence)      Hematuria since Friday    2. ONSET: \"When did the  s/s  start? \"      Hematuria, dysuria, frequency, urgency. Right lower pain shooting to RUQ pain    3. PAIN: \"Is there any pain? \" If so, ask: \"How bad is it? \" (Scale: 1-10; mild, moderate, severe)      \"15\"    4. CAUSE: \"What do you think is causing the symptoms? \"      States UTI    5. OTHER SYMPTOMS: \"Do you have any other symptoms? \" (e.g., fever, flank pain, blood in urine, pain with urination)      Chills since last night. RUQ pain, states history of gall stones. Right flank pain. 6. PREGNANCY: \"Is there any chance you are pregnant? \" \"When was your last menstrual period? \"      LMP beginning June    Protocols used: [facility]

## 2021-06-28 NOTE — TELEPHONE ENCOUNTER
No appts: with provider available.     MA visit okay for:  Brief description of triage: hematuria, urinary frequency and urgency

## 2021-08-13 ENCOUNTER — HOSPITAL ENCOUNTER (OUTPATIENT)
Dept: LAB | Age: 31
Setting detail: SPECIMEN
Discharge: HOME | End: 2021-08-13
Payer: COMMERCIAL

## 2021-08-13 DIAGNOSIS — Z01.818 PREOP TESTING: Primary | ICD-10-CM

## 2021-08-13 PROCEDURE — U0005 INFEC AGEN DETEC AMPLI PROBE: HCPCS

## 2021-08-13 PROCEDURE — U0003 INFECTIOUS AGENT DETECTION BY NUCLEIC ACID (DNA OR RNA); SEVERE ACUTE RESPIRATORY SYNDROME CORONAVIRUS 2 (SARS-COV-2) (CORONAVIRUS DISEASE [COVID-19]), AMPLIFIED PROBE TECHNIQUE, MAKING USE OF HIGH THROUGHPUT TECHNOLOGIES AS DESCRIBED BY CMS-2020-01-R: HCPCS

## 2021-08-14 LAB
SARS-COV-2: NORMAL
SARS-COV-2: NOT DETECTED
SOURCE: NORMAL

## 2021-08-17 ENCOUNTER — ANESTHESIA EVENT (OUTPATIENT)
Dept: OPERATING ROOM | Age: 31
End: 2021-08-17
Payer: COMMERCIAL

## 2021-08-17 ENCOUNTER — ANESTHESIA (OUTPATIENT)
Dept: OPERATING ROOM | Age: 31
End: 2021-08-17
Payer: COMMERCIAL

## 2021-08-17 ENCOUNTER — HOSPITAL ENCOUNTER (OUTPATIENT)
Age: 31
Setting detail: OUTPATIENT SURGERY
Discharge: HOME | End: 2021-08-17
Attending: SURGERY | Admitting: SURGERY
Payer: COMMERCIAL

## 2021-08-17 VITALS
HEIGHT: 62 IN | WEIGHT: 130 LBS | BODY MASS INDEX: 23.92 KG/M2 | HEART RATE: 82 BPM | OXYGEN SATURATION: 98 % | DIASTOLIC BLOOD PRESSURE: 73 MMHG | TEMPERATURE: 97.5 F | RESPIRATION RATE: 15 BRPM | SYSTOLIC BLOOD PRESSURE: 126 MMHG

## 2021-08-17 VITALS — OXYGEN SATURATION: 100 % | SYSTOLIC BLOOD PRESSURE: 115 MMHG | TEMPERATURE: 96.8 F | DIASTOLIC BLOOD PRESSURE: 59 MMHG

## 2021-08-17 DIAGNOSIS — Z90.49 S/P LAPAROSCOPIC CHOLECYSTECTOMY: Primary | ICD-10-CM

## 2021-08-17 LAB — HCG, PREGNANCY URINE (POC): NEGATIVE

## 2021-08-17 PROCEDURE — 88304 TISSUE EXAM BY PATHOLOGIST: CPT

## 2021-08-17 PROCEDURE — 7100000011 HC PHASE II RECOVERY - ADDTL 15 MIN: Performed by: SURGERY

## 2021-08-17 PROCEDURE — 7100000010 HC PHASE II RECOVERY - FIRST 15 MIN: Performed by: SURGERY

## 2021-08-17 PROCEDURE — 7100000001 HC PACU RECOVERY - ADDTL 15 MIN: Performed by: SURGERY

## 2021-08-17 PROCEDURE — 6360000002 HC RX W HCPCS: Performed by: NURSE ANESTHETIST, CERTIFIED REGISTERED

## 2021-08-17 PROCEDURE — 2500000003 HC RX 250 WO HCPCS: Performed by: SURGERY

## 2021-08-17 PROCEDURE — 6360000002 HC RX W HCPCS: Performed by: ANESTHESIOLOGY

## 2021-08-17 PROCEDURE — 3600000019 HC SURGERY ROBOT ADDTL 15MIN: Performed by: SURGERY

## 2021-08-17 PROCEDURE — 3700000001 HC ADD 15 MINUTES (ANESTHESIA): Performed by: SURGERY

## 2021-08-17 PROCEDURE — 7100000000 HC PACU RECOVERY - FIRST 15 MIN: Performed by: SURGERY

## 2021-08-17 PROCEDURE — 2709999900 HC NON-CHARGEABLE SUPPLY: Performed by: SURGERY

## 2021-08-17 PROCEDURE — 47562 LAPAROSCOPIC CHOLECYSTECTOMY: CPT | Performed by: SURGERY

## 2021-08-17 PROCEDURE — 3600000009 HC SURGERY ROBOT BASE: Performed by: SURGERY

## 2021-08-17 PROCEDURE — 6370000000 HC RX 637 (ALT 250 FOR IP): Performed by: ANESTHESIOLOGY

## 2021-08-17 PROCEDURE — 2780000010 HC IMPLANT OTHER: Performed by: SURGERY

## 2021-08-17 PROCEDURE — 2580000003 HC RX 258: Performed by: ANESTHESIOLOGY

## 2021-08-17 PROCEDURE — S2900 ROBOTIC SURGICAL SYSTEM: HCPCS | Performed by: SURGERY

## 2021-08-17 PROCEDURE — 81025 URINE PREGNANCY TEST: CPT

## 2021-08-17 PROCEDURE — 2500000003 HC RX 250 WO HCPCS: Performed by: NURSE ANESTHETIST, CERTIFIED REGISTERED

## 2021-08-17 PROCEDURE — 3700000000 HC ANESTHESIA ATTENDED CARE: Performed by: SURGERY

## 2021-08-17 PROCEDURE — 6360000002 HC RX W HCPCS: Performed by: SURGERY

## 2021-08-17 DEVICE — Z DUP USE 2641840 CLIP INT L POLYMER LOK LIG HEM O LOK: Type: IMPLANTABLE DEVICE | Status: FUNCTIONAL

## 2021-08-17 RX ORDER — HYDROCODONE BITARTRATE AND ACETAMINOPHEN 5; 325 MG/1; MG/1
1 TABLET ORAL PRN
Status: COMPLETED | OUTPATIENT
Start: 2021-08-17 | End: 2021-08-17

## 2021-08-17 RX ORDER — SODIUM CHLORIDE 0.9 % (FLUSH) 0.9 %
10 SYRINGE (ML) INJECTION PRN
Status: DISCONTINUED | OUTPATIENT
Start: 2021-08-17 | End: 2021-08-17 | Stop reason: HOSPADM

## 2021-08-17 RX ORDER — ROCURONIUM BROMIDE 10 MG/ML
INJECTION, SOLUTION INTRAVENOUS PRN
Status: DISCONTINUED | OUTPATIENT
Start: 2021-08-17 | End: 2021-08-17 | Stop reason: SDUPTHER

## 2021-08-17 RX ORDER — SODIUM CHLORIDE 9 MG/ML
25 INJECTION, SOLUTION INTRAVENOUS PRN
Status: DISCONTINUED | OUTPATIENT
Start: 2021-08-17 | End: 2021-08-17 | Stop reason: HOSPADM

## 2021-08-17 RX ORDER — SODIUM CHLORIDE 9 MG/ML
INJECTION, SOLUTION INTRAVENOUS CONTINUOUS
Status: DISCONTINUED | OUTPATIENT
Start: 2021-08-17 | End: 2021-08-17 | Stop reason: HOSPADM

## 2021-08-17 RX ORDER — MIDAZOLAM HYDROCHLORIDE 1 MG/ML
INJECTION INTRAMUSCULAR; INTRAVENOUS PRN
Status: DISCONTINUED | OUTPATIENT
Start: 2021-08-17 | End: 2021-08-17 | Stop reason: SDUPTHER

## 2021-08-17 RX ORDER — SODIUM CHLORIDE 0.9 % (FLUSH) 0.9 %
10 SYRINGE (ML) INJECTION EVERY 12 HOURS SCHEDULED
Status: DISCONTINUED | OUTPATIENT
Start: 2021-08-17 | End: 2021-08-17 | Stop reason: HOSPADM

## 2021-08-17 RX ORDER — ONDANSETRON 4 MG/1
4 TABLET, FILM COATED ORAL EVERY 8 HOURS PRN
Qty: 28 TABLET | Refills: 0 | Status: SHIPPED | OUTPATIENT
Start: 2021-08-17

## 2021-08-17 RX ORDER — LIDOCAINE HYDROCHLORIDE 20 MG/ML
INJECTION, SOLUTION EPIDURAL; INFILTRATION; INTRACAUDAL; PERINEURAL PRN
Status: DISCONTINUED | OUTPATIENT
Start: 2021-08-17 | End: 2021-08-17 | Stop reason: SDUPTHER

## 2021-08-17 RX ORDER — FENTANYL CITRATE 50 UG/ML
50 INJECTION, SOLUTION INTRAMUSCULAR; INTRAVENOUS EVERY 5 MIN PRN
Status: DISCONTINUED | OUTPATIENT
Start: 2021-08-17 | End: 2021-08-17 | Stop reason: HOSPADM

## 2021-08-17 RX ORDER — FENTANYL CITRATE 50 UG/ML
25 INJECTION, SOLUTION INTRAMUSCULAR; INTRAVENOUS EVERY 5 MIN PRN
Status: DISCONTINUED | OUTPATIENT
Start: 2021-08-17 | End: 2021-08-17 | Stop reason: HOSPADM

## 2021-08-17 RX ORDER — PROPOFOL 10 MG/ML
INJECTION, EMULSION INTRAVENOUS PRN
Status: DISCONTINUED | OUTPATIENT
Start: 2021-08-17 | End: 2021-08-17 | Stop reason: SDUPTHER

## 2021-08-17 RX ORDER — LIDOCAINE HYDROCHLORIDE 10 MG/ML
1 INJECTION, SOLUTION EPIDURAL; INFILTRATION; INTRACAUDAL; PERINEURAL
Status: DISCONTINUED | OUTPATIENT
Start: 2021-08-17 | End: 2021-08-17 | Stop reason: HOSPADM

## 2021-08-17 RX ORDER — INDOCYANINE GREEN AND WATER 25 MG
5 KIT INJECTION ONCE
Status: COMPLETED | OUTPATIENT
Start: 2021-08-17 | End: 2021-08-17

## 2021-08-17 RX ORDER — SODIUM CHLORIDE, SODIUM LACTATE, POTASSIUM CHLORIDE, CALCIUM CHLORIDE 600; 310; 30; 20 MG/100ML; MG/100ML; MG/100ML; MG/100ML
INJECTION, SOLUTION INTRAVENOUS CONTINUOUS
Status: DISCONTINUED | OUTPATIENT
Start: 2021-08-17 | End: 2021-08-17 | Stop reason: HOSPADM

## 2021-08-17 RX ORDER — DEXAMETHASONE SODIUM PHOSPHATE 10 MG/ML
INJECTION, SOLUTION INTRAMUSCULAR; INTRAVENOUS PRN
Status: DISCONTINUED | OUTPATIENT
Start: 2021-08-17 | End: 2021-08-17 | Stop reason: SDUPTHER

## 2021-08-17 RX ORDER — OXYCODONE HYDROCHLORIDE AND ACETAMINOPHEN 5; 325 MG/1; MG/1
1 TABLET ORAL EVERY 6 HOURS PRN
Qty: 28 TABLET | Refills: 0 | Status: SHIPPED | OUTPATIENT
Start: 2021-08-17 | End: 2021-08-24

## 2021-08-17 RX ORDER — ONDANSETRON 2 MG/ML
4 INJECTION INTRAMUSCULAR; INTRAVENOUS
Status: DISCONTINUED | OUTPATIENT
Start: 2021-08-17 | End: 2021-08-17 | Stop reason: HOSPADM

## 2021-08-17 RX ORDER — ONDANSETRON 2 MG/ML
INJECTION INTRAMUSCULAR; INTRAVENOUS PRN
Status: DISCONTINUED | OUTPATIENT
Start: 2021-08-17 | End: 2021-08-17 | Stop reason: SDUPTHER

## 2021-08-17 RX ORDER — FENTANYL CITRATE 50 UG/ML
INJECTION, SOLUTION INTRAMUSCULAR; INTRAVENOUS PRN
Status: DISCONTINUED | OUTPATIENT
Start: 2021-08-17 | End: 2021-08-17 | Stop reason: SDUPTHER

## 2021-08-17 RX ORDER — KETOROLAC TROMETHAMINE 30 MG/ML
INJECTION, SOLUTION INTRAMUSCULAR; INTRAVENOUS PRN
Status: DISCONTINUED | OUTPATIENT
Start: 2021-08-17 | End: 2021-08-17 | Stop reason: SDUPTHER

## 2021-08-17 RX ORDER — HYDROCODONE BITARTRATE AND ACETAMINOPHEN 5; 325 MG/1; MG/1
2 TABLET ORAL PRN
Status: COMPLETED | OUTPATIENT
Start: 2021-08-17 | End: 2021-08-17

## 2021-08-17 RX ADMIN — CEFAZOLIN 2000 MG: 10 INJECTION, POWDER, FOR SOLUTION INTRAVENOUS at 09:24

## 2021-08-17 RX ADMIN — SUGAMMADEX 200 MG: 100 INJECTION, SOLUTION INTRAVENOUS at 09:55

## 2021-08-17 RX ADMIN — KETOROLAC TROMETHAMINE 30 MG: 30 INJECTION, SOLUTION INTRAMUSCULAR; INTRAVENOUS at 09:51

## 2021-08-17 RX ADMIN — HYDROCODONE BITARTRATE AND ACETAMINOPHEN 1 TABLET: 5; 325 TABLET ORAL at 11:44

## 2021-08-17 RX ADMIN — ONDANSETRON 4 MG: 2 INJECTION, SOLUTION INTRAMUSCULAR; INTRAVENOUS at 09:53

## 2021-08-17 RX ADMIN — SODIUM CHLORIDE, POTASSIUM CHLORIDE, SODIUM LACTATE AND CALCIUM CHLORIDE: 600; 310; 30; 20 INJECTION, SOLUTION INTRAVENOUS at 08:33

## 2021-08-17 RX ADMIN — INDOCYANINE GREEN AND WATER 5 MG: KIT at 08:40

## 2021-08-17 RX ADMIN — PROPOFOL 200 MG: 10 INJECTION, EMULSION INTRAVENOUS at 09:17

## 2021-08-17 RX ADMIN — DEXAMETHASONE SODIUM PHOSPHATE 10 MG: 10 INJECTION INTRAMUSCULAR; INTRAVENOUS at 09:45

## 2021-08-17 RX ADMIN — Medication 100 MCG: at 09:21

## 2021-08-17 RX ADMIN — MIDAZOLAM 2 MG: 1 INJECTION INTRAMUSCULAR; INTRAVENOUS at 09:15

## 2021-08-17 RX ADMIN — FENTANYL CITRATE 50 MCG: 50 INJECTION, SOLUTION INTRAMUSCULAR; INTRAVENOUS at 10:45

## 2021-08-17 RX ADMIN — LIDOCAINE HYDROCHLORIDE 100 MG: 20 INJECTION, SOLUTION EPIDURAL; INFILTRATION; INTRACAUDAL; PERINEURAL at 09:17

## 2021-08-17 RX ADMIN — ROCURONIUM BROMIDE 30 MG: 10 INJECTION, SOLUTION INTRAVENOUS at 09:17

## 2021-08-17 ASSESSMENT — PAIN SCALES - GENERAL
PAINLEVEL_OUTOF10: 9
PAINLEVEL_OUTOF10: 6
PAINLEVEL_OUTOF10: 9
PAINLEVEL_OUTOF10: 0

## 2021-08-17 ASSESSMENT — PULMONARY FUNCTION TESTS
PIF_VALUE: 18
PIF_VALUE: 18
PIF_VALUE: 3
PIF_VALUE: 1
PIF_VALUE: 18
PIF_VALUE: 24
PIF_VALUE: 17
PIF_VALUE: 1
PIF_VALUE: 24
PIF_VALUE: 0
PIF_VALUE: 22
PIF_VALUE: 24
PIF_VALUE: 0
PIF_VALUE: 24
PIF_VALUE: 24
PIF_VALUE: 2
PIF_VALUE: 2
PIF_VALUE: 24
PIF_VALUE: 1
PIF_VALUE: 24
PIF_VALUE: 2
PIF_VALUE: 25
PIF_VALUE: 24
PIF_VALUE: 24
PIF_VALUE: 2
PIF_VALUE: 2
PIF_VALUE: 23
PIF_VALUE: 2
PIF_VALUE: 18
PIF_VALUE: 19
PIF_VALUE: 16
PIF_VALUE: 0
PIF_VALUE: 17
PIF_VALUE: 3
PIF_VALUE: 24
PIF_VALUE: 19
PIF_VALUE: 24
PIF_VALUE: 23
PIF_VALUE: 18
PIF_VALUE: 3
PIF_VALUE: 24
PIF_VALUE: 24
PIF_VALUE: 0
PIF_VALUE: 18
PIF_VALUE: 22
PIF_VALUE: 2
PIF_VALUE: 23
PIF_VALUE: 16
PIF_VALUE: 17
PIF_VALUE: 18
PIF_VALUE: 23
PIF_VALUE: 18
PIF_VALUE: 22
PIF_VALUE: 23
PIF_VALUE: 18

## 2021-08-17 ASSESSMENT — PAIN DESCRIPTION - LOCATION
LOCATION: ABDOMEN

## 2021-08-17 ASSESSMENT — PAIN DESCRIPTION - ONSET
ONSET: ON-GOING

## 2021-08-17 ASSESSMENT — PAIN DESCRIPTION - FREQUENCY
FREQUENCY: CONTINUOUS

## 2021-08-17 ASSESSMENT — PAIN - FUNCTIONAL ASSESSMENT
PAIN_FUNCTIONAL_ASSESSMENT: 0-10
PAIN_FUNCTIONAL_ASSESSMENT: ACTIVITIES ARE NOT PREVENTED

## 2021-08-17 ASSESSMENT — PAIN DESCRIPTION - DESCRIPTORS: DESCRIPTORS: ACHING

## 2021-08-17 ASSESSMENT — PAIN DESCRIPTION - PAIN TYPE
TYPE: SURGICAL PAIN

## 2021-08-17 NOTE — ANESTHESIA POSTPROCEDURE EVALUATION
Department of Anesthesiology  Postprocedure Note    Patient: Que Johnson  MRN: [id number]  YOB: 1990  Date of evaluation: 8/17/2021  Time:  2:13 PM     Procedure Summary     Date: 08/17/21 Room / Location: [address] [place] [address] / [facility] - INPATIENT    Anesthesia Start: 0915 Anesthesia Stop: 1016    Procedure: CHOLECYSTECTOMY LAPAROSCOPIC ROBOTIC XI (N/A ) Diagnosis: (Alexus Espinal ([facility]))    Surgeons: Papito Parr MD Responsible Provider: Susan Shore MD    Anesthesia Type: general ASA Status: 2          Anesthesia Type: general    Felicitas Phase I: Felicitas Score: 10    Felicitas Phase II: Felicitas Score: 10    Last vitals: Reviewed and per EMR flowsheets.        Anesthesia Post Evaluation    Complications: no

## 2021-08-17 NOTE — OP NOTE
Operative Note      Patient: Dolly Martin  YOB: 1990  MRN: [id number]    Date of Procedure: 8/17/2021    Pre-Op Diagnosis: GALLSTONES ([facility])    Post-Op Diagnosis: Same       Procedure(s):  CHOLECYSTECTOMY LAPAROSCOPIC ROBOTIC XI, ICG AND FIREFLY TECHNOLOGY FROM VISUALIZATION OF CYSTIC DUCT AND BILIARY TREE    Surgeon(s):  Betsy Ibrahim MD    Assistant:   Resident: Deanna Villalobos DO    Anesthesia: General    Estimated Blood Loss (mL): Minimal    Complications: None    Specimens:   ID Type Source Tests Collected by Time Destination   A : GALLBLADDER AND CONTENTS Tissue Gallbladder SURGICAL PATHOLOGY Betsy Ibrahim MD 8/17/2021 8125        Implants:  Implant Name Type Inv. Item Serial No.  Lot No. LRB No. Used Action   Z DUP USE [id number] CLIP INT L POLYMER ROSETTA LIG HEM O ROSETTA  Z DUP USE [id number] CLIP INT L POLYMER ROSETTA LIG HEM O ROSETTA  TELEFLEX [id number]  N/A 1 Implanted         Drains: * No LDAs found *    Findings: Chronic cholecystitis with cholelithiasis    Indications for procedure: This is a very pleasant 73-year-old female with a history of abdominal pain. The patient was noted to have an abnormal ultrasound suggesting gallstones. Her symptoms were consistent with biliary colic. Risk and benefits of robotic assisted laparoscopic cholecystectomy were discussed with the patient and verbal and written consent was obtained. This includes the risks of bleeding, infection, bile leak, bile duct injury, bowel injury, retained common bile duct stones, postcholecystectomy dumping syndrome, incision or port site hernias, need for conversion to an open procedure, failure to resolve her symptoms, and risks of any anesthetic    Detailed Description of Procedure:   After verbal and written consent, the patient was brought to the operating room. After appropriate monitoring, general anesthesia was administered.   A timeout was performed with the staff in the room confirming both the patient and the periumbilical trocar site. This was passed off the field for permanent pathologic evaluation. After assuring hemostasis, the trochars and instruments were removed. Pneumoperitoneum was evacuated. The infraumbilical fascial defect was closed with a figure-of-eight PDS suture. The skin was closed with running Monocryl suture. Dermabond was applied to the wounds. The sponge, lap, needle, and instrument count were correct at the end of the case. The patient was awakened from anesthesia and transported to the recovery in stable condition. There were no immediate complications.     Electronically signed by Rodo Gallego MD on 8/17/2021 at 10:07 AM
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
## 2021-08-18 LAB — SURGICAL PATHOLOGY REPORT: NORMAL

## 2021-09-21 ENCOUNTER — TELEPHONE (OUTPATIENT)
Dept: FAMILY MEDICINE CLINIC | Age: 31
End: 2021-09-21

## 2021-09-21 NOTE — TELEPHONE ENCOUNTER
Does she want urinary testing and bloodwork? Her two concerns are not necessarily connected.  If STD testing is normal she should return to OB/GYN and possibly general surgeon for her post-op concerns

## 2021-09-21 NOTE — TELEPHONE ENCOUNTER
----- Message from [employer] sent at 9/21/2021 11:22 AM EDT -----  Subject: Appointment Request    Reason for Call: Routine (Patient Request) No Script    QUESTIONS  Type of Appointment? Established Patient  Reason for appointment request? Available appointments did not meet   patient need  Additional Information for Provider? Patient had bleeding during sex and   is still having Hanna Dredge from gallbladder removal, requesting an   appointment asa for std testing and the above listed. Please call back to   schedule appointment.  ---------------------------------------------------------------------------  --------------  Lanie Gu[employer] INFO  What is the best way for the office to contact you? OK to leave message on   voicemail  Preferred Call Back Phone Number? [phone]  ---------------------------------------------------------------------------  --------------  SCRIPT ANSWERS  Relationship to Patient? Self  (Is the patient requesting to see the provider for a procedure?)? No  (Is the patient requesting to see the provider urgently  today or   tomorrow. )? No  Have you been diagnosed with, awaiting test results for, or told that you   are suspected of having COVID-19 (Coronavirus)? (If patient has tested   negative or was tested as a requirement for work, school, or travel and   not based on symptoms, answer no)? No  Within the past two weeks have you developed any of the following symptoms   (answer no if symptoms have been present longer than 2 weeks or began   more than 2 weeks ago)? Fever or Chills, Cough, Shortness of breath or   difficulty breathing, Loss of taste or smell, Sore throat, Nasal   congestion, Sneezing or runny nose, Fatigue or generalized body aches   (answer no if pain is specific to a body part e.g. back pain), Diarrhea,   Headache? No  Have you had close contact with someone with COVID-19 in the last 14 days?    No  (Service Expert  click yes below to proceed with [employer] As Usual Scheduling)?  Yes

## 2021-09-30 LAB — PAP SMEAR, EXTERNAL: NORMAL

## 2022-02-01 ENCOUNTER — NURSE TRIAGE (OUTPATIENT)
Dept: OTHER | Facility: CLINIC | Age: 32
End: 2022-02-01

## 2022-02-01 ENCOUNTER — TELEMEDICINE (OUTPATIENT)
Dept: FAMILY MEDICINE CLINIC | Age: 32
End: 2022-02-01
Payer: COMMERCIAL

## 2022-02-01 DIAGNOSIS — R11.2 NON-INTRACTABLE VOMITING WITH NAUSEA, UNSPECIFIED VOMITING TYPE: ICD-10-CM

## 2022-02-01 DIAGNOSIS — R42 DIZZINESS: Primary | ICD-10-CM

## 2022-02-01 PROCEDURE — 99213 OFFICE O/P EST LOW 20 MIN: CPT | Performed by: NURSE PRACTITIONER

## 2022-02-01 PROCEDURE — G8428 CUR MEDS NOT DOCUMENT: HCPCS | Performed by: NURSE PRACTITIONER

## 2022-02-01 ASSESSMENT — ENCOUNTER SYMPTOMS
VOMITING: 1
CHEST TIGHTNESS: 0
CONSTIPATION: 0
NAUSEA: 1

## 2022-02-01 NOTE — TELEPHONE ENCOUNTER
Received call from Alfonzo Madden at [facility] with [employer]. Subjective: Caller states \"dizzy and vomits after she eats\"     Current Symptoms: dizzy, vomiting, sob with exertion    Onset: 2 days ago; gradual, unchanged    Associated Symptoms: reduced activity, reduced appetite    Pain Severity: 0/10; N/A; none    Temperature: none    What has been tried: tylenol    LMP: depo Pregnant: No    Recommended disposition: to be seen today by pcp or ucc if appt nor available    Care advice provided, patient verbalizes understanding; denies any other questions or concerns; instructed to call back for any new or worsening symptoms. Writer provided warm transfer to Ye at [facility] for appointment scheduling     Attention Provider: Thank you for allowing me to participate in the care of your patient. The patient was connected to triage in response to information provided to the ECC/PSC. Please do not respond through this encounter as the response is not directed to a shared pool.         Reason for Disposition   Vomiting occurs with dizziness    Protocols used: JOSE

## 2022-02-01 NOTE — PROGRESS NOTES
Samara Macario (:  1990) is a Established patient, here for evaluation of the following:    Assessment & Plan   Below is the assessment and plan developed based on review of pertinent history, physical exam, labs, studies, and medications. 1. Dizziness  -     Basic Metabolic Panel; Future  -     CBC; Future  2. Non-intractable vomiting with nausea, unspecified vomiting type  -     Basic Metabolic Panel; Future  -     CBC; Future    Requested she come in to office for BP check. Return if symptoms worsen or fail to improve. Subjective   HPI     The last 4-5 days has been very lightheaded and dizzy. Has been getting nauseated and will throw up. Had turkey, mashed potatoes, mac and cheese, and beans last night and then threw up. Has been hard to breath going up and down stairs. Heart feels like it's beating fast. Will have dizziness going from sitting to standing at times. She is drinking as much as she can, about 2-3 bottles per day and 2 cans of [facility]. Abdomen was hard last night after vomiting. BMs having been 1-2 per day over past few days, last week had diarrhea. Significant other had COVID beginning of January. She never had symptoms so was never tested. No new medications. She is not taking anything to help with symptoms. Review of Systems   Constitutional: Positive for appetite change. Negative for fatigue, fever and unexpected weight change. Respiratory: Negative for chest tightness and shortness of breath. Cardiovascular: Negative for chest pain and palpitations. Gastrointestinal: Positive for nausea and vomiting. Negative for constipation. Genitourinary: Negative for dysuria. Neurological: Positive for dizziness and light-headedness.           Objective   Patient-Reported Vitals  No data recorded     Physical Exam  [INSTRUCTIONS:  \"[x]\" Indicates a positive item  \"[]\" Indicates a negative item  -- DELETE ALL ITEMS NOT EXAMINED]    Constitutional: [x] Appears well-developed and well-nourished [x] No apparent distress      [] Abnormal -     Mental status: [x] Alert and awake  [x] Oriented to person/place/time [x] Able to follow commands    [] Abnormal -     Eyes:   EOM    [x]  Normal    [] Abnormal -   Sclera  [x]  Normal    [] Abnormal -          Discharge [x]  None visible   [] Abnormal -     HENT: [x] Normocephalic, atraumatic  [] Abnormal -   [x] Mouth/Throat: Mucous membranes are moist    External Ears [x] Normal  [] Abnormal -    Neck: [x] No visualized mass [] Abnormal -     Pulmonary/Chest: [x] Respiratory effort normal   [x] No visualized signs of difficulty breathing or respiratory distress        [] Abnormal -      Musculoskeletal:   [x] Normal gait with no signs of ataxia         [x] Normal range of motion of neck        [] Abnormal -     Neurological:        [x] No Facial Asymmetry (Cranial nerve 7 motor function) (limited exam due to video visit)          [x] No gaze palsy        [] Abnormal -          Skin:        [x] No significant exanthematous lesions or discoloration noted on facial skin         [] Abnormal -            Psychiatric:       [x] Normal Affect [] Abnormal -        [x] No Hallucinations    Other pertinent observable physical exam findings:-             On this date 2/1/2022 I have spent 15 minutes reviewing previous notes, test results and face to face (virtual) with the patient discussing the diagnosis and importance of compliance with the treatment plan as well as documenting on the day of the visit. [facility] and the [facility], was evaluated through a synchronous (real-time) audio-video encounter. The patient (or guardian if applicable) is aware that this is a billable service, which includes applicable co-pays. This Virtual Visit was conducted with patient's (and/or legal guardian's) consent.  The visit was conducted pursuant to the emergency declaration under the [address] waiver authority and the Coronavirus Preparedness and Response Supplemental Appropriations Act. Patient identification was verified, and a caregiver was present when appropriate. The patient was located at home in a state where the provider was licensed to provide care.        --Brooke Reynolds, APRN - CNP

## 2022-02-03 ASSESSMENT — ENCOUNTER SYMPTOMS: SHORTNESS OF BREATH: 0

## 2022-05-05 ENCOUNTER — TELEPHONE (OUTPATIENT)
Dept: FAMILY MEDICINE CLINIC | Age: 32
End: 2022-05-05

## 2022-05-05 NOTE — TELEPHONE ENCOUNTER
----- Message from [address] sent at 5/5/2022 12:35 PM EDT -----  Subject: Appointment Request    Reason for Call: Routine (Patient Request) No Script    QUESTIONS  Type of Appointment? Established Patient  Reason for appointment request? No appointments available during search  Additional Information for Provider? pt had surgery 4/29/2022. Her right   hand has been swollen and numb every since. she cannot  objects. the only appts avail are vv. She needs to be seen in person  ---------------------------------------------------------------------------  --------------  [employer]  What is the best way for the office to contact you? OK to leave message on   voicemail  Preferred Call Back Phone Number? [phone]  ---------------------------------------------------------------------------  --------------  SCRIPT ANSWERS  Relationship to Patient? Self  (Is the patient requesting to see the provider for a procedure?)? No  (Is the patient requesting to see the provider urgently  today or   tomorrow. )? No  Have you been diagnosed with, awaiting test results for, or told that you   are suspected of having COVID-19 (Coronavirus)? (If patient has tested   negative or was tested as a requirement for work, school, or travel and   not based on symptoms, answer no)? No  Within the past 10 days have you developed any of the following symptoms   (answer no if symptoms have been present longer than 10 days or began   more than 10 days ago)? Fever or Chills, Cough, Shortness of breath or   difficulty breathing, Loss of taste or smell, Sore throat, Nasal   congestion, Sneezing or runny nose, Fatigue or generalized body aches   (answer no if pain is specific to a body part e.g. back pain), Diarrhea,   Headache? No  Have you had close contact with someone with COVID-19 in the last 7 days? No  (Service Expert  click yes below to proceed with [employer] As Usual   Scheduling)?  Yes

## 2022-05-06 ENCOUNTER — OFFICE VISIT (OUTPATIENT)
Dept: FAMILY MEDICINE CLINIC | Age: 32
End: 2022-05-06
Payer: COMMERCIAL

## 2022-05-06 ENCOUNTER — HOSPITAL ENCOUNTER (OUTPATIENT)
Age: 32
Setting detail: SPECIMEN
Discharge: HOME | End: 2022-05-06

## 2022-05-06 VITALS
OXYGEN SATURATION: 98 % | DIASTOLIC BLOOD PRESSURE: 70 MMHG | BODY MASS INDEX: 25.51 KG/M2 | RESPIRATION RATE: 18 BRPM | SYSTOLIC BLOOD PRESSURE: 126 MMHG | WEIGHT: 138.6 LBS | HEIGHT: 62 IN | TEMPERATURE: 97.8 F | HEART RATE: 94 BPM

## 2022-05-06 DIAGNOSIS — R20.0 NUMBNESS AND TINGLING IN LEFT HAND: ICD-10-CM

## 2022-05-06 DIAGNOSIS — I87.1 NUTCRACKER PHENOMENON OF RENAL VEIN: ICD-10-CM

## 2022-05-06 DIAGNOSIS — R22.31 LOCALIZED SWELLING ON RIGHT HAND: ICD-10-CM

## 2022-05-06 DIAGNOSIS — R20.2 NUMBNESS AND TINGLING IN LEFT HAND: ICD-10-CM

## 2022-05-06 DIAGNOSIS — M79.89 SWELLING OF RIGHT UPPER EXTREMITY: Primary | ICD-10-CM

## 2022-05-06 LAB
ABSOLUTE EOS #: 0.71 K/UL (ref 0–0.44)
ABSOLUTE IMMATURE GRANULOCYTE: 0.03 K/UL (ref 0–0.3)
ABSOLUTE LYMPH #: 2.55 K/UL (ref 1.1–3.7)
ABSOLUTE MONO #: 0.66 K/UL (ref 0.1–1.2)
ALBUMIN SERPL-MCNC: 4.8 G/DL (ref 3.5–5.2)
ALBUMIN/GLOBULIN RATIO: 1.6 (ref 1–2.5)
ALP BLD-CCNC: 71 U/L (ref 35–104)
ALT SERPL-CCNC: 80 U/L (ref 5–33)
ANION GAP SERPL CALCULATED.3IONS-SCNC: 14 MMOL/L (ref 9–17)
AST SERPL-CCNC: 19 U/L
BASOPHILS # BLD: 1 % (ref 0–2)
BASOPHILS ABSOLUTE: 0.08 K/UL (ref 0–0.2)
BILIRUB SERPL-MCNC: 0.3 MG/DL (ref 0.3–1.2)
BUN BLDV-MCNC: 11 MG/DL (ref 6–20)
CALCIUM SERPL-MCNC: 9.4 MG/DL (ref 8.6–10.4)
CHLORIDE BLD-SCNC: 105 MMOL/L (ref 98–107)
CO2: 24 MMOL/L (ref 20–31)
CREAT SERPL-MCNC: 0.5 MG/DL (ref 0.5–0.9)
EOSINOPHILS RELATIVE PERCENT: 9 % (ref 1–4)
GFR AFRICAN AMERICAN: >60 ML/MIN
GFR NON-AFRICAN AMERICAN: >60 ML/MIN
GFR SERPL CREATININE-BSD FRML MDRD: ABNORMAL ML/MIN/{1.73_M2}
GLUCOSE BLD-MCNC: 94 MG/DL (ref 70–99)
HCT VFR BLD CALC: 40.1 % (ref 36.3–47.1)
HEMOGLOBIN: 13.3 G/DL (ref 11.9–15.1)
IMMATURE GRANULOCYTES: 0 %
LYMPHOCYTES # BLD: 32 % (ref 24–43)
MAGNESIUM: 2.2 MG/DL (ref 1.6–2.6)
MCH RBC QN AUTO: 29.1 PG (ref 25.2–33.5)
MCHC RBC AUTO-ENTMCNC: 33.2 G/DL (ref 28.4–34.8)
MCV RBC AUTO: 87.7 FL (ref 82.6–102.9)
MONOCYTES # BLD: 8 % (ref 3–12)
NRBC AUTOMATED: 0 PER 100 WBC
PDW BLD-RTO: 13.1 % (ref 11.8–14.4)
PLATELET # BLD: 336 K/UL (ref 138–453)
PMV BLD AUTO: 9.4 FL (ref 8.1–13.5)
POTASSIUM SERPL-SCNC: 4.5 MMOL/L (ref 3.7–5.3)
RBC # BLD: 4.57 M/UL (ref 3.95–5.11)
SEG NEUTROPHILS: 50 % (ref 36–65)
SEGMENTED NEUTROPHILS ABSOLUTE COUNT: 4 K/UL (ref 1.5–8.1)
SODIUM BLD-SCNC: 143 MMOL/L (ref 135–144)
THYROXINE, FREE: 1.39 NG/DL (ref 0.93–1.7)
TOTAL PROTEIN: 7.8 G/DL (ref 6.4–8.3)
TSH SERPL DL<=0.05 MIU/L-ACNC: 0.75 UIU/ML (ref 0.3–5)
VITAMIN B-12: 670 PG/ML (ref 232–1245)
WBC # BLD: 8 K/UL (ref 3.5–11.3)

## 2022-05-06 PROCEDURE — G8419 CALC BMI OUT NRM PARAM NOF/U: HCPCS | Performed by: NURSE PRACTITIONER

## 2022-05-06 PROCEDURE — G8427 DOCREV CUR MEDS BY ELIG CLIN: HCPCS | Performed by: NURSE PRACTITIONER

## 2022-05-06 PROCEDURE — 99213 OFFICE O/P EST LOW 20 MIN: CPT | Performed by: NURSE PRACTITIONER

## 2022-05-06 PROCEDURE — 1036F TOBACCO NON-USER: CPT | Performed by: NURSE PRACTITIONER

## 2022-05-06 SDOH — ECONOMIC STABILITY: FOOD INSECURITY: WITHIN THE PAST 12 MONTHS, YOU WORRIED THAT YOUR FOOD WOULD RUN OUT BEFORE YOU GOT MONEY TO BUY MORE.: OFTEN TRUE

## 2022-05-06 SDOH — ECONOMIC STABILITY: FOOD INSECURITY: WITHIN THE PAST 12 MONTHS, THE FOOD YOU BOUGHT JUST DIDN'T LAST AND YOU DIDN'T HAVE MONEY TO GET MORE.: NEVER TRUE

## 2022-05-06 SDOH — ECONOMIC STABILITY: FOOD INSECURITY: WITHIN THE PAST 12 MONTHS, THE FOOD YOU BOUGHT JUST DIDN'T LAST AND YOU DIDN'T HAVE MONEY TO GET MORE.: OFTEN TRUE

## 2022-05-06 SDOH — ECONOMIC STABILITY: FOOD INSECURITY: WITHIN THE PAST 12 MONTHS, YOU WORRIED THAT YOUR FOOD WOULD RUN OUT BEFORE YOU GOT MONEY TO BUY MORE.: NEVER TRUE

## 2022-05-06 ASSESSMENT — PATIENT HEALTH QUESTIONNAIRE - PHQ9
6. FEELING BAD ABOUT YOURSELF - OR THAT YOU ARE A FAILURE OR HAVE LET YOURSELF OR YOUR FAMILY DOWN: 0
SUM OF ALL RESPONSES TO PHQ9 QUESTIONS 1 & 2: 5
2. FEELING DOWN, DEPRESSED OR HOPELESS: 3
SUM OF ALL RESPONSES TO PHQ QUESTIONS 1-9: 8
SUM OF ALL RESPONSES TO PHQ QUESTIONS 1-9: 8
SUM OF ALL RESPONSES TO PHQ9 QUESTIONS 1 & 2: 5
SUM OF ALL RESPONSES TO PHQ QUESTIONS 1-9: 5
3. TROUBLE FALLING OR STAYING ASLEEP: 2
1. LITTLE INTEREST OR PLEASURE IN DOING THINGS: 2
SUM OF ALL RESPONSES TO PHQ QUESTIONS 1-9: 8
SUM OF ALL RESPONSES TO PHQ QUESTIONS 1-9: 5
5. POOR APPETITE OR OVEREATING: 0
10. IF YOU CHECKED OFF ANY PROBLEMS, HOW DIFFICULT HAVE THESE PROBLEMS MADE IT FOR YOU TO DO YOUR WORK, TAKE CARE OF THINGS AT HOME, OR GET ALONG WITH OTHER PEOPLE: 1
SUM OF ALL RESPONSES TO PHQ QUESTIONS 1-9: 5
8. MOVING OR SPEAKING SO SLOWLY THAT OTHER PEOPLE COULD HAVE NOTICED. OR THE OPPOSITE, BEING SO FIGETY OR RESTLESS THAT YOU HAVE BEEN MOVING AROUND A LOT MORE THAN USUAL: 0
4. FEELING TIRED OR HAVING LITTLE ENERGY: 1
SUM OF ALL RESPONSES TO PHQ QUESTIONS 1-9: 8
9. THOUGHTS THAT YOU WOULD BE BETTER OFF DEAD, OR OF HURTING YOURSELF: 0
1. LITTLE INTEREST OR PLEASURE IN DOING THINGS: 2
SUM OF ALL RESPONSES TO PHQ QUESTIONS 1-9: 5
7. TROUBLE CONCENTRATING ON THINGS, SUCH AS READING THE NEWSPAPER OR WATCHING TELEVISION: 0
2. FEELING DOWN, DEPRESSED OR HOPELESS: 3

## 2022-05-06 ASSESSMENT — ENCOUNTER SYMPTOMS
RESPIRATORY NEGATIVE: 1
SHORTNESS OF BREATH: 0
COUGH: 0

## 2022-05-06 ASSESSMENT — SOCIAL DETERMINANTS OF HEALTH (SDOH): HOW HARD IS IT FOR YOU TO PAY FOR THE VERY BASICS LIKE FOOD, HOUSING, MEDICAL CARE, AND HEATING?: NOT HARD AT ALL

## 2022-05-06 NOTE — PROGRESS NOTES
Depression screening done  Financial resource strain done  Chief Complaint   Patient presents with    Hand Pain     right hand swelling, after surgery on 04/29/2022

## 2022-05-06 NOTE — PATIENT INSTRUCTIONS
FOOD RESOURCES      RESOURCE SERVICE PHONE [employer]   Outreach of Ashlie Marsh 442 [phone]  Administrative www.[email or web address] org   Mustard [employer] [phone]  ChantelKaiden Njvirginia 47 - and Fax N/A   Gaurav Johnson [facility] [phone] 47[address] Pat Cabrera [phone] [address] / [facility] [phone]  [place] 236 Assistance Programs [phone]  Eva Shrestha. [address] Feed Your Neighbor [phone]  Shira Brambila 178 [phone]  [address] [facility] [address] [phone] [address] [phone]  Service-Intake www.ignaciaationarmynwohio. 5353 [facility] Emergency Food Pantry [phone]  Service-Intake www.[facility]o. [facility] [address] [phone]  Service-Intake www.stpaulscommunitycenter. [address] [place] [address] [phone]  Administrative www. [facility] HospitalpercyWeisbrod Memorial County Hospitalbetito. [address] Bank Back Pack Program [phone]  Renae Erazo. [address] Meals Program [phone] 450 [facility] and [facility] for men www.[facility]. [facility] FOR [facility]ERY for Social and 351 [facility] [phone] www. providencecentertobandaro. [address] [place] [address] [phone]  Shaylakeo Busby 187. com    [employer] Emergency Assistance [phone] [address] Feed Your Neighbor [phone] Administrative N/A   [facility] 65 22 [phone]  [address] / Tatiana Few [phone]  112 [facility] [phone] 300 [facility] Behavioral Health Pantry / Meals [phone]  [address] [facility] www. virgieGeorge Regional Hospital. [facility] 50 Pantry / [employer] [phone]  Administrative www. zulema. [facility] [phone]  [address] of [address] [facility] / Wandy Beets [phone]  1840 Wealthy  Se Pantry and Grooming Supplies [phone]  [address]  [email or web address]    [address] [phone] 1300 [facility] PASSAVANT-CRANBERRY-ER Positive Living Program [phone] ext: [phone] [facility] www. InterMed Discovery. LemonStand.    Food for [address] 42-30-72-51 www. feedtoledo. [place] 50 Pantry [phone]  [address] Assembly of [address] [phone] http://www. TreatsietheGoodPeopleion. Divshot    777 [facility] [phone]  227 [facility] Family Pantry and [place] [phone] nightingales-harvest.org    201 [facility] [phone]  . [facility] HospitalpenelopeA[facility] www. [email or web address]    [facility] [phone]  Administrative factoledo. [address] [phone]  Toll Free www. Shareable Ink    Body of 3 [facility] for [place] 1 Pantry (750) 110 [facility] 545 2076 www. HistoryFileDowagiacSpinnaker BiosciencesemTrendrMansfield Hospital. Outitude     Living [address] [phone]  [address] Morning Blessings [phone]  Administrative N/A   [facility]  of 1333 [facility] Feed Your Neighbor [phone]  100 [facility] Food Pantry [phone]  854 [facility]. org/   Gnosticist of Samantha 30 Pantry  [phone] Untangelica Bernardo Myers 13 Emergency Food and Hygiene Pantry [phone]  Administrative www. friendlyProtestant [facility]aconess [facility]. [address] [phone]  2000 [facility] [phone]  [address] tv    Helping Hands of [address] / Cristian Door / Forrest Murguia [phone]  607 [facility]. [address] Pantry Ministry [phone]  [place] [address] [phone]  [address] [place] [address] Pant  [phone]  FoxyTunes.pt    125 [facility] Pantry [phone] [email or web address]   400 [facility] Pantry  [phone] N/A   Food for 31 [facility] Pantry [phone] N/A     Updated 1/30/20  Sol [employer] SERVICE PHONE 8778 [facility]'S PSYCHIATRIC [place], 2601 [facility], [address] [place] [address] Road  [phone] ext: [phone] [facility] www. Endra. org   Epiphany of the [facility] Tito, Gas Service, [address]  [phone]  Service-Intake www.epiphanyofthelord. 5633 [address]. [facility] [phone]  Service-Intake
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
## 2022-05-11 ENCOUNTER — HOSPITAL ENCOUNTER (OUTPATIENT)
Dept: VASCULAR LAB | Age: 32
Discharge: HOME | End: 2022-05-11
Payer: COMMERCIAL

## 2022-05-11 DIAGNOSIS — R22.31 LOCALIZED SWELLING ON RIGHT HAND: ICD-10-CM

## 2022-05-11 DIAGNOSIS — M79.89 SWELLING OF RIGHT UPPER EXTREMITY: Primary | ICD-10-CM

## 2022-05-11 DIAGNOSIS — M79.89 SWELLING OF RIGHT UPPER EXTREMITY: ICD-10-CM

## 2022-05-11 DIAGNOSIS — R20.2 NUMBNESS AND TINGLING IN LEFT HAND: ICD-10-CM

## 2022-05-11 DIAGNOSIS — R20.0 NUMBNESS AND TINGLING IN LEFT HAND: Primary | ICD-10-CM

## 2022-05-11 DIAGNOSIS — R20.0 NUMBNESS AND TINGLING IN LEFT HAND: ICD-10-CM

## 2022-05-11 DIAGNOSIS — R20.2 NUMBNESS AND TINGLING IN LEFT HAND: Primary | ICD-10-CM

## 2022-05-11 PROCEDURE — 93970 EXTREMITY STUDY: CPT

## 2022-05-12 ENCOUNTER — TELEPHONE (OUTPATIENT)
Dept: FAMILY MEDICINE CLINIC | Age: 32
End: 2022-05-12

## 2022-05-16 DIAGNOSIS — R74.01 ELEVATED ALT MEASUREMENT: Primary | ICD-10-CM

## 2022-05-17 ENCOUNTER — PATIENT MESSAGE (OUTPATIENT)
Dept: FAMILY MEDICINE CLINIC | Age: 32
End: 2022-05-17

## 2022-05-17 NOTE — TELEPHONE ENCOUNTER
From: [facility]  To: Paddy Hoffman  Sent: 5/17/2022 9:56 AM EDT  Subject: Test Results    I have been waiting since last week to have my results looked at and have someone contact me. I would like to know what's going on.

## 2022-05-19 ENCOUNTER — HOSPITAL ENCOUNTER (OUTPATIENT)
Age: 32
Setting detail: SPECIMEN
Discharge: HOME | End: 2022-05-19

## 2022-05-19 DIAGNOSIS — R74.01 ELEVATED ALT MEASUREMENT: ICD-10-CM

## 2022-05-19 LAB
ALBUMIN SERPL-MCNC: 4.5 G/DL (ref 3.5–5.2)
ALBUMIN/GLOBULIN RATIO: 1.7 (ref 1–2.5)
ALP BLD-CCNC: 62 U/L (ref 35–104)
ALT SERPL-CCNC: 22 U/L (ref 5–33)
AST SERPL-CCNC: 27 U/L
BILIRUB SERPL-MCNC: 0.45 MG/DL (ref 0.3–1.2)
BILIRUBIN DIRECT: 0.08 MG/DL
BILIRUBIN, INDIRECT: 0.37 MG/DL (ref 0–1)
HAV IGM SER IA-ACNC: NONREACTIVE
HEPATITIS B CORE IGM ANTIBODY: NONREACTIVE
HEPATITIS B SURFACE ANTIGEN: NONREACTIVE
HEPATITIS C ANTIBODY: NONREACTIVE
INR BLD: 0.9
PROTHROMBIN TIME: 10.2 SEC (ref 9.1–12.3)
TOTAL PROTEIN: 7.2 G/DL (ref 6.4–8.3)

## 2022-05-26 PROBLEM — R22.31 LOCALIZED SWELLING ON RIGHT HAND: Status: ACTIVE | Noted: 2022-05-26

## 2022-05-26 PROBLEM — R20.0 NUMBNESS AND TINGLING IN LEFT HAND: Status: ACTIVE | Noted: 2022-05-26

## 2022-05-26 PROBLEM — M79.89 SWELLING OF RIGHT UPPER EXTREMITY: Status: ACTIVE | Noted: 2022-05-26

## 2022-05-26 PROBLEM — R20.2 NUMBNESS AND TINGLING IN LEFT HAND: Status: ACTIVE | Noted: 2022-05-26

## 2022-05-26 NOTE — ASSESSMENT & PLAN NOTE
Unclear control, continue current treatment plan.   Continue following with vascular surgery after recent procedure

## 2022-05-26 NOTE — ASSESSMENT & PLAN NOTE
Uncontrolled, continue current plan pending work up below.  See vascular surgery if all testing is WNL

## 2022-09-14 ENCOUNTER — OFFICE VISIT (OUTPATIENT)
Dept: FAMILY MEDICINE CLINIC | Age: 32
End: 2022-09-14
Payer: COMMERCIAL

## 2022-09-14 ENCOUNTER — HOSPITAL ENCOUNTER (OUTPATIENT)
Age: 32
Setting detail: SPECIMEN
Discharge: HOME | End: 2022-09-14

## 2022-09-14 VITALS
HEART RATE: 85 BPM | RESPIRATION RATE: 17 BRPM | HEIGHT: 62 IN | BODY MASS INDEX: 25.76 KG/M2 | DIASTOLIC BLOOD PRESSURE: 67 MMHG | SYSTOLIC BLOOD PRESSURE: 110 MMHG | WEIGHT: 140 LBS | OXYGEN SATURATION: 97 % | TEMPERATURE: 99.4 F

## 2022-09-14 DIAGNOSIS — R53.83 OTHER FATIGUE: ICD-10-CM

## 2022-09-14 DIAGNOSIS — R10.2 PELVIC PAIN: ICD-10-CM

## 2022-09-14 DIAGNOSIS — R74.01 ELEVATED ALT MEASUREMENT: ICD-10-CM

## 2022-09-14 DIAGNOSIS — R23.2 FACIAL FLUSHING: ICD-10-CM

## 2022-09-14 DIAGNOSIS — I87.1 NUTCRACKER PHENOMENON OF RENAL VEIN: ICD-10-CM

## 2022-09-14 DIAGNOSIS — N92.6 IRREGULAR PERIODS: Primary | ICD-10-CM

## 2022-09-14 DIAGNOSIS — N92.6 IRREGULAR PERIODS: ICD-10-CM

## 2022-09-14 LAB
ABSOLUTE EOS #: 0.39 K/UL (ref 0–0.44)
ABSOLUTE IMMATURE GRANULOCYTE: <0.03 K/UL (ref 0–0.3)
ABSOLUTE LYMPH #: 1.58 K/UL (ref 1.1–3.7)
ABSOLUTE MONO #: 0.39 K/UL (ref 0.1–1.2)
ALBUMIN SERPL-MCNC: 4.9 G/DL (ref 3.5–5.2)
ALBUMIN/GLOBULIN RATIO: 1.9 (ref 1–2.5)
ALP BLD-CCNC: 57 U/L (ref 35–104)
ALT SERPL-CCNC: 9 U/L (ref 5–33)
ANION GAP SERPL CALCULATED.3IONS-SCNC: 15 MMOL/L (ref 9–17)
AST SERPL-CCNC: 15 U/L
BASOPHILS # BLD: 1 % (ref 0–2)
BASOPHILS ABSOLUTE: 0.06 K/UL (ref 0–0.2)
BILIRUB SERPL-MCNC: 0.4 MG/DL (ref 0.3–1.2)
BUN BLDV-MCNC: 10 MG/DL (ref 6–20)
CALCIUM SERPL-MCNC: 8.9 MG/DL (ref 8.6–10.4)
CHLORIDE BLD-SCNC: 105 MMOL/L (ref 98–107)
CO2: 22 MMOL/L (ref 20–31)
CREAT SERPL-MCNC: 0.53 MG/DL (ref 0.5–0.9)
EOSINOPHILS RELATIVE PERCENT: 6 % (ref 1–4)
FOLLICLE STIMULATING HORMONE: 7 MIU/ML (ref 1.7–21.5)
GFR AFRICAN AMERICAN: >60 ML/MIN
GFR NON-AFRICAN AMERICAN: >60 ML/MIN
GFR SERPL CREATININE-BSD FRML MDRD: NORMAL ML/MIN/{1.73_M2}
GLUCOSE BLD-MCNC: 89 MG/DL (ref 70–99)
HCG QUANTITATIVE: <1 MIU/ML
HCT VFR BLD CALC: 40.1 % (ref 36.3–47.1)
HEMOGLOBIN: 13.5 G/DL (ref 11.9–15.1)
IMMATURE GRANULOCYTES: 0 %
LH: 4.9 MIU/ML (ref 1–95.6)
LYMPHOCYTES # BLD: 26 % (ref 24–43)
MCH RBC QN AUTO: 29.3 PG (ref 25.2–33.5)
MCHC RBC AUTO-ENTMCNC: 33.7 G/DL (ref 28.4–34.8)
MCV RBC AUTO: 87 FL (ref 82.6–102.9)
MONOCYTES # BLD: 6 % (ref 3–12)
NRBC AUTOMATED: 0 PER 100 WBC
PDW BLD-RTO: 13.3 % (ref 11.8–14.4)
PLATELET # BLD: 277 K/UL (ref 138–453)
PMV BLD AUTO: 9.8 FL (ref 8.1–13.5)
POTASSIUM SERPL-SCNC: 4.5 MMOL/L (ref 3.7–5.3)
PROLACTIN: 8.12 NG/ML (ref 4.79–23.3)
RBC # BLD: 4.61 M/UL (ref 3.95–5.11)
SEG NEUTROPHILS: 61 % (ref 36–65)
SEGMENTED NEUTROPHILS ABSOLUTE COUNT: 3.74 K/UL (ref 1.5–8.1)
SEX HORMONE BINDING GLOBULIN: 41 NMOL/L (ref 30–135)
SODIUM BLD-SCNC: 142 MMOL/L (ref 135–144)
T3 FREE: 3.55 PG/ML (ref 2.02–4.43)
TESTOSTERONE FREE-NONMALE: 3 PG/ML (ref 0.8–7.4)
TESTOSTERONE TOTAL: 19 NG/DL (ref 20–70)
TESTOSTERONE, BIOAVAILABLE: 7 NG/DL (ref 2.2–20.6)
THYROXINE, FREE: 1.39 NG/DL (ref 0.93–1.7)
TOTAL PROTEIN: 7.5 G/DL (ref 6.4–8.3)
TSH SERPL DL<=0.05 MIU/L-ACNC: 0.59 UIU/ML (ref 0.3–5)
WBC # BLD: 6.2 K/UL (ref 3.5–11.3)

## 2022-09-14 PROCEDURE — 1036F TOBACCO NON-USER: CPT | Performed by: NURSE PRACTITIONER

## 2022-09-14 PROCEDURE — G8427 DOCREV CUR MEDS BY ELIG CLIN: HCPCS | Performed by: NURSE PRACTITIONER

## 2022-09-14 PROCEDURE — 99213 OFFICE O/P EST LOW 20 MIN: CPT | Performed by: NURSE PRACTITIONER

## 2022-09-14 PROCEDURE — G8419 CALC BMI OUT NRM PARAM NOF/U: HCPCS | Performed by: NURSE PRACTITIONER

## 2022-09-14 SDOH — ECONOMIC STABILITY: FOOD INSECURITY: WITHIN THE PAST 12 MONTHS, THE FOOD YOU BOUGHT JUST DIDN'T LAST AND YOU DIDN'T HAVE MONEY TO GET MORE.: NEVER TRUE

## 2022-09-14 SDOH — ECONOMIC STABILITY: FOOD INSECURITY: WITHIN THE PAST 12 MONTHS, YOU WORRIED THAT YOUR FOOD WOULD RUN OUT BEFORE YOU GOT MONEY TO BUY MORE.: NEVER TRUE

## 2022-09-14 ASSESSMENT — ENCOUNTER SYMPTOMS
NAUSEA: 1
EYES NEGATIVE: 1
VOMITING: 1
RESPIRATORY NEGATIVE: 1
ABDOMINAL PAIN: 1

## 2022-09-14 ASSESSMENT — PATIENT HEALTH QUESTIONNAIRE - PHQ9
1. LITTLE INTEREST OR PLEASURE IN DOING THINGS: 1
SUM OF ALL RESPONSES TO PHQ9 QUESTIONS 1 & 2: 1
SUM OF ALL RESPONSES TO PHQ QUESTIONS 1-9: 5
4. FEELING TIRED OR HAVING LITTLE ENERGY: 3
3. TROUBLE FALLING OR STAYING ASLEEP: 1
5. POOR APPETITE OR OVEREATING: 0
10. IF YOU CHECKED OFF ANY PROBLEMS, HOW DIFFICULT HAVE THESE PROBLEMS MADE IT FOR YOU TO DO YOUR WORK, TAKE CARE OF THINGS AT HOME, OR GET ALONG WITH OTHER PEOPLE: 0
SUM OF ALL RESPONSES TO PHQ QUESTIONS 1-9: 5
2. FEELING DOWN, DEPRESSED OR HOPELESS: 0
9. THOUGHTS THAT YOU WOULD BE BETTER OFF DEAD, OR OF HURTING YOURSELF: 0
SUM OF ALL RESPONSES TO PHQ QUESTIONS 1-9: 5
7. TROUBLE CONCENTRATING ON THINGS, SUCH AS READING THE NEWSPAPER OR WATCHING TELEVISION: 0
6. FEELING BAD ABOUT YOURSELF - OR THAT YOU ARE A FAILURE OR HAVE LET YOURSELF OR YOUR FAMILY DOWN: 0
8. MOVING OR SPEAKING SO SLOWLY THAT OTHER PEOPLE COULD HAVE NOTICED. OR THE OPPOSITE, BEING SO FIGETY OR RESTLESS THAT YOU HAVE BEEN MOVING AROUND A LOT MORE THAN USUAL: 0
SUM OF ALL RESPONSES TO PHQ QUESTIONS 1-9: 5

## 2022-09-14 ASSESSMENT — SOCIAL DETERMINANTS OF HEALTH (SDOH): HOW HARD IS IT FOR YOU TO PAY FOR THE VERY BASICS LIKE FOOD, HOUSING, MEDICAL CARE, AND HEATING?: NOT HARD AT ALL

## 2022-09-14 NOTE — PROGRESS NOTES
Depression screening done  Financial resource strain done  Chief Complaint   Patient presents with    Orders     Asking to have labs done for hormone imbalance

## 2022-09-14 NOTE — PATIENT INSTRUCTIONS
New Updates for [facility] BREANN    Thank you for choosing [facility] to give you the best care! [facility] ([facility]) is always trying to think of new ways to help their patients. We are asking all patients to try out the new digital registration that is now available through the [employer] [address] Rosariotrupti. Down load today! . Via the breann you're now able to update your personal and registration information prior to your upcoming appointment. This will save you time once you arrive at the office to check-in, not to mention your information remains safe!! Many other perks come from signing up for an account, such as:  Requesting refills  Scheduling an appointment  Completing an E-Visit  Sending a message to the office/provider  Having access to your medication list  Paying your bill/copay prior to your appointment  Scheduling your yearly mammogram  Review your test results    If you are not familiar the [facility] BREANN, please ask one of us and we will be happy to answer any questions or help you set-up your account.
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
## 2022-09-16 LAB
INSULIN FREE: 13 UIU/ML (ref 3–25)
INSULIN: 18 UIU/ML (ref 3–25)

## 2022-12-21 ENCOUNTER — TELEPHONE (OUTPATIENT)
Dept: FAMILY MEDICINE CLINIC | Age: 32
End: 2022-12-21

## 2022-12-21 NOTE — TELEPHONE ENCOUNTER
----- Message from Kamran Aguirre sent at 12/21/2022 10:18 AM EST -----  Subject: Message to Provider    QUESTIONS  Information for Provider? pt is requesting a copy of her last office visit   summary printed and for her to pick it up on tomorrow 12/22. Pt needs this   social security. please contact pt when this ready for .  ---------------------------------------------------------------------------  --------------  [id number] [employer]  [phone]; OK to leave message on voicemail, OK to respond with   electronic message via [employer] portal (only for patients who have   registered [employer] account)  ---------------------------------------------------------------------------  --------------  SCRIPT ANSWERS  Relationship to Patient?  Self

## 2023-01-16 NOTE — LACTATION NOTE
Met with mom at bedside, partner asleep on couch, baby asleep in crib. Mom reports she has inverted nipples, was never able to get her 18 month old son to latch so she pumped for 8 months. Does not use shield\"they don't work for [facility]". Was able to get this baby to latch with assist from her partner. Is also using [employer] breast pump reports 27 too tight. Provided mom both 30 and 36 mm flanges and encouraged her to call for size check with next pumping. Also supplementing with formula. Denies other needs at this time. Written breast feeding educational material left at bedside. Sarecycline Counseling: Patient advised regarding possible photosensitivity and discoloration of the teeth, skin, lips, tongue and gums.  Patient instructed to avoid sunlight, if possible.  When exposed to sunlight, patients should wear protective clothing, sunglasses, and sunscreen.  The patient was instructed to call the office immediately if the following severe adverse effects occur:  hearing changes, easy bruising/bleeding, severe headache, or vision changes.  The patient verbalized understanding of the proper use and possible adverse effects of sarecycline.  All of the patient's questions and concerns were addressed.

## (undated) DEVICE — TUBING, SUCTION, 1/4" X 12', STRAIGHT: Brand: MEDLINE

## (undated) DEVICE — YANKAUER,FLEXIBLE HANDLE,REGLR CAPACITY: Brand: MEDLINE INDUSTRIES, INC.

## (undated) DEVICE — PROTECTOR EYE PT SELF ADH NS OPT GRD LF

## (undated) DEVICE — CANNULA SEAL

## (undated) DEVICE — SWABSTICK MEDICATED 10% POVIDONE IOD PVP SGL ANTISEP SAT

## (undated) DEVICE — SUTURE PDS II SZ 0 L27IN ABSRB VLT UR-6 L26MM 1/2 CIR D7185

## (undated) DEVICE — BLANKET WRM W29.9XL79.1IN UP BODY FORC AIR MISTRAL-AIR

## (undated) DEVICE — TROCAR: Brand: KII FIOS FIRST ENTRY

## (undated) DEVICE — BLADELESS OBTURATOR: Brand: WECK VISTA

## (undated) DEVICE — ARM DRAPE

## (undated) DEVICE — ADHESIVE SKIN CLSR 0.7ML TOP DERMBND ADV

## (undated) DEVICE — DUAL LUMEN STOMACH TUBE: Brand: SALEM SUMP

## (undated) DEVICE — ST. ANNE'S MULTI PORT PACK: Brand: MEDLINE INDUSTRIES, INC.

## (undated) DEVICE — GLOVE SURG SZ 75 CRM LTX FREE POLYISOPRENE POLYMER BEAD ANTI

## (undated) DEVICE — SUTURE SZ 0 27IN 5/8 CIR UR-6  TAPER PT VIOLET ABSRB VICRYL J603H

## (undated) DEVICE — SUTURE MCRYL SZ 4-0 L27IN ABSRB UD L19MM PS-2 1/2 CIR PRIM Y426H

## (undated) DEVICE — GARMENT,MEDLINE,DVT,INT,CALF,MED, GEN2: Brand: MEDLINE

## (undated) DEVICE — TROCARS: Brand: KII® BALLOON BLUNT TIP SYSTEM